# Patient Record
Sex: MALE | Race: WHITE | NOT HISPANIC OR LATINO | Employment: FULL TIME | URBAN - METROPOLITAN AREA
[De-identification: names, ages, dates, MRNs, and addresses within clinical notes are randomized per-mention and may not be internally consistent; named-entity substitution may affect disease eponyms.]

---

## 2018-04-06 ENCOUNTER — APPOINTMENT (OUTPATIENT)
Dept: LAB | Facility: CLINIC | Age: 33
End: 2018-04-06
Payer: COMMERCIAL

## 2018-04-06 ENCOUNTER — TRANSCRIBE ORDERS (OUTPATIENT)
Dept: LAB | Facility: CLINIC | Age: 33
End: 2018-04-06

## 2018-04-06 DIAGNOSIS — Z00.00 ROUTINE GENERAL MEDICAL EXAMINATION AT A HEALTH CARE FACILITY: ICD-10-CM

## 2018-04-06 DIAGNOSIS — Z00.00 ROUTINE GENERAL MEDICAL EXAMINATION AT A HEALTH CARE FACILITY: Primary | ICD-10-CM

## 2018-04-06 LAB
ALBUMIN SERPL BCP-MCNC: 4.3 G/DL (ref 3.5–5)
ALP SERPL-CCNC: 55 U/L (ref 46–116)
ALT SERPL W P-5'-P-CCNC: 27 U/L (ref 12–78)
ANION GAP SERPL CALCULATED.3IONS-SCNC: 6 MMOL/L (ref 4–13)
AST SERPL W P-5'-P-CCNC: 18 U/L (ref 5–45)
BILIRUB SERPL-MCNC: 1.55 MG/DL (ref 0.2–1)
BUN SERPL-MCNC: 15 MG/DL (ref 5–25)
CALCIUM SERPL-MCNC: 9.4 MG/DL (ref 8.3–10.1)
CHLORIDE SERPL-SCNC: 105 MMOL/L (ref 100–108)
CHOLEST SERPL-MCNC: 183 MG/DL (ref 50–200)
CO2 SERPL-SCNC: 29 MMOL/L (ref 21–32)
CREAT SERPL-MCNC: 1.15 MG/DL (ref 0.6–1.3)
ERYTHROCYTE [DISTWIDTH] IN BLOOD BY AUTOMATED COUNT: 12.3 % (ref 11.6–15.1)
FERRITIN SERPL-MCNC: 126 NG/ML (ref 8–388)
GFR SERPL CREATININE-BSD FRML MDRD: 84 ML/MIN/1.73SQ M
GLUCOSE P FAST SERPL-MCNC: 92 MG/DL (ref 65–99)
HCT VFR BLD AUTO: 44.6 % (ref 36.5–49.3)
HDLC SERPL-MCNC: 80 MG/DL (ref 40–60)
HGB BLD-MCNC: 15.9 G/DL (ref 12–17)
IRON SATN MFR SERPL: 36 %
IRON SERPL-MCNC: 131 UG/DL (ref 65–175)
LDLC SERPL CALC-MCNC: 85 MG/DL (ref 0–100)
MCH RBC QN AUTO: 32.9 PG (ref 26.8–34.3)
MCHC RBC AUTO-ENTMCNC: 35.7 G/DL (ref 31.4–37.4)
MCV RBC AUTO: 92 FL (ref 82–98)
NONHDLC SERPL-MCNC: 103 MG/DL
PLATELET # BLD AUTO: 226 THOUSANDS/UL (ref 149–390)
PMV BLD AUTO: 9.6 FL (ref 8.9–12.7)
POTASSIUM SERPL-SCNC: 3.9 MMOL/L (ref 3.5–5.3)
PROT SERPL-MCNC: 7.7 G/DL (ref 6.4–8.2)
RBC # BLD AUTO: 4.83 MILLION/UL (ref 3.88–5.62)
SODIUM SERPL-SCNC: 140 MMOL/L (ref 136–145)
TIBC SERPL-MCNC: 362 UG/DL (ref 250–450)
TRIGL SERPL-MCNC: 90 MG/DL
TSH SERPL DL<=0.05 MIU/L-ACNC: 1.5 UIU/ML (ref 0.36–3.74)
WBC # BLD AUTO: 7.51 THOUSAND/UL (ref 4.31–10.16)

## 2018-04-06 PROCEDURE — 36415 COLL VENOUS BLD VENIPUNCTURE: CPT

## 2018-04-06 PROCEDURE — 83550 IRON BINDING TEST: CPT

## 2018-04-06 PROCEDURE — 80053 COMPREHEN METABOLIC PANEL: CPT

## 2018-04-06 PROCEDURE — 85027 COMPLETE CBC AUTOMATED: CPT

## 2018-04-06 PROCEDURE — 82728 ASSAY OF FERRITIN: CPT

## 2018-04-06 PROCEDURE — 84443 ASSAY THYROID STIM HORMONE: CPT

## 2018-04-06 PROCEDURE — 83540 ASSAY OF IRON: CPT

## 2018-04-06 PROCEDURE — 80061 LIPID PANEL: CPT

## 2019-05-10 ENCOUNTER — OFFICE VISIT (OUTPATIENT)
Dept: INTERNAL MEDICINE CLINIC | Facility: CLINIC | Age: 34
End: 2019-05-10
Payer: COMMERCIAL

## 2019-05-10 VITALS
SYSTOLIC BLOOD PRESSURE: 120 MMHG | OXYGEN SATURATION: 100 % | HEART RATE: 76 BPM | HEIGHT: 74 IN | DIASTOLIC BLOOD PRESSURE: 74 MMHG | BODY MASS INDEX: 23.1 KG/M2 | WEIGHT: 180 LBS

## 2019-05-10 DIAGNOSIS — K90.0 CELIAC DISEASE: ICD-10-CM

## 2019-05-10 DIAGNOSIS — R00.2 PALPITATIONS: Primary | ICD-10-CM

## 2019-05-10 DIAGNOSIS — K59.04 CHRONIC IDIOPATHIC CONSTIPATION: ICD-10-CM

## 2019-05-10 DIAGNOSIS — Z13.6 SCREENING FOR CARDIOVASCULAR CONDITION: ICD-10-CM

## 2019-05-10 DIAGNOSIS — F33.41 RECURRENT MAJOR DEPRESSIVE DISORDER, IN PARTIAL REMISSION (HCC): ICD-10-CM

## 2019-05-10 DIAGNOSIS — M54.41 CHRONIC RIGHT-SIDED LOW BACK PAIN WITH RIGHT-SIDED SCIATICA: ICD-10-CM

## 2019-05-10 DIAGNOSIS — E55.9 VITAMIN D DEFICIENCY: ICD-10-CM

## 2019-05-10 DIAGNOSIS — G44.52 NEW DAILY PERSISTENT HEADACHE: ICD-10-CM

## 2019-05-10 DIAGNOSIS — G89.29 CHRONIC RIGHT-SIDED LOW BACK PAIN WITH RIGHT-SIDED SCIATICA: ICD-10-CM

## 2019-05-10 PROBLEM — F32.A DEPRESSION: Status: ACTIVE | Noted: 2019-05-10

## 2019-05-10 PROBLEM — M54.9 BACK PAIN: Status: ACTIVE | Noted: 2019-05-10

## 2019-05-10 PROCEDURE — 99204 OFFICE O/P NEW MOD 45 MIN: CPT | Performed by: INTERNAL MEDICINE

## 2019-05-10 PROCEDURE — 3008F BODY MASS INDEX DOCD: CPT | Performed by: INTERNAL MEDICINE

## 2019-05-10 RX ORDER — BUPROPION HYDROCHLORIDE 300 MG/1
300 TABLET ORAL DAILY
Refills: 0 | COMMUNITY
Start: 2019-04-14 | End: 2021-08-31 | Stop reason: ALTCHOICE

## 2019-06-06 ENCOUNTER — HOSPITAL ENCOUNTER (OUTPATIENT)
Dept: NON INVASIVE DIAGNOSTICS | Facility: CLINIC | Age: 34
Discharge: HOME | End: 2019-06-06
Payer: COMMERCIAL

## 2019-06-06 DIAGNOSIS — R00.2 PALPITATIONS: ICD-10-CM

## 2019-06-06 PROCEDURE — 93226 XTRNL ECG REC<48 HR SCAN A/R: CPT

## 2019-06-06 PROCEDURE — 93225 XTRNL ECG REC<48 HRS REC: CPT

## 2019-06-11 PROCEDURE — 93227 XTRNL ECG REC<48 HR R&I: CPT | Performed by: INTERNAL MEDICINE

## 2019-06-12 ENCOUNTER — TELEPHONE (OUTPATIENT)
Dept: INTERNAL MEDICINE CLINIC | Facility: CLINIC | Age: 34
End: 2019-06-12

## 2019-07-12 ENCOUNTER — TELEPHONE (OUTPATIENT)
Dept: INTERNAL MEDICINE CLINIC | Facility: CLINIC | Age: 34
End: 2019-07-12

## 2019-07-12 ENCOUNTER — LAB (OUTPATIENT)
Dept: LAB | Facility: CLINIC | Age: 34
End: 2019-07-12
Payer: COMMERCIAL

## 2019-07-12 DIAGNOSIS — G44.52 NEW DAILY PERSISTENT HEADACHE: ICD-10-CM

## 2019-07-12 DIAGNOSIS — E55.9 VITAMIN D DEFICIENCY: ICD-10-CM

## 2019-07-12 DIAGNOSIS — R00.2 PALPITATIONS: ICD-10-CM

## 2019-07-12 DIAGNOSIS — Z13.6 SCREENING FOR CARDIOVASCULAR CONDITION: ICD-10-CM

## 2019-07-12 LAB
25(OH)D3 SERPL-MCNC: 43.4 NG/ML (ref 30–100)
ALBUMIN SERPL BCP-MCNC: 4.3 G/DL (ref 3.5–5)
ALP SERPL-CCNC: 52 U/L (ref 46–116)
ALT SERPL W P-5'-P-CCNC: 27 U/L (ref 12–78)
ANION GAP SERPL CALCULATED.3IONS-SCNC: 4 MMOL/L (ref 4–13)
AST SERPL W P-5'-P-CCNC: 15 U/L (ref 5–45)
BASOPHILS # BLD AUTO: 0.03 THOUSANDS/ΜL (ref 0–0.1)
BASOPHILS NFR BLD AUTO: 0 % (ref 0–1)
BILIRUB SERPL-MCNC: 1.37 MG/DL (ref 0.2–1)
BUN SERPL-MCNC: 15 MG/DL (ref 5–25)
CALCIUM SERPL-MCNC: 9.4 MG/DL (ref 8.3–10.1)
CHLORIDE SERPL-SCNC: 103 MMOL/L (ref 100–108)
CHOLEST SERPL-MCNC: 190 MG/DL (ref 50–200)
CO2 SERPL-SCNC: 30 MMOL/L (ref 21–32)
CREAT SERPL-MCNC: 1.13 MG/DL (ref 0.6–1.3)
EOSINOPHIL # BLD AUTO: 0.27 THOUSAND/ΜL (ref 0–0.61)
EOSINOPHIL NFR BLD AUTO: 4 % (ref 0–6)
ERYTHROCYTE [DISTWIDTH] IN BLOOD BY AUTOMATED COUNT: 12 % (ref 11.6–15.1)
GFR SERPL CREATININE-BSD FRML MDRD: 85 ML/MIN/1.73SQ M
GLUCOSE P FAST SERPL-MCNC: 87 MG/DL (ref 65–99)
HCT VFR BLD AUTO: 46.4 % (ref 36.5–49.3)
HDLC SERPL-MCNC: 78 MG/DL (ref 40–60)
HGB BLD-MCNC: 15.6 G/DL (ref 12–17)
IMM GRANULOCYTES # BLD AUTO: 0.01 THOUSAND/UL (ref 0–0.2)
IMM GRANULOCYTES NFR BLD AUTO: 0 % (ref 0–2)
LDLC SERPL CALC-MCNC: 100 MG/DL (ref 0–100)
LYMPHOCYTES # BLD AUTO: 1.75 THOUSANDS/ΜL (ref 0.6–4.47)
LYMPHOCYTES NFR BLD AUTO: 26 % (ref 14–44)
MCH RBC QN AUTO: 31.9 PG (ref 26.8–34.3)
MCHC RBC AUTO-ENTMCNC: 33.6 G/DL (ref 31.4–37.4)
MCV RBC AUTO: 95 FL (ref 82–98)
MONOCYTES # BLD AUTO: 0.7 THOUSAND/ΜL (ref 0.17–1.22)
MONOCYTES NFR BLD AUTO: 10 % (ref 4–12)
NEUTROPHILS # BLD AUTO: 4.01 THOUSANDS/ΜL (ref 1.85–7.62)
NEUTS SEG NFR BLD AUTO: 60 % (ref 43–75)
NONHDLC SERPL-MCNC: 112 MG/DL
NRBC BLD AUTO-RTO: 0 /100 WBCS
PLATELET # BLD AUTO: 217 THOUSANDS/UL (ref 149–390)
PMV BLD AUTO: 9.6 FL (ref 8.9–12.7)
POTASSIUM SERPL-SCNC: 3.8 MMOL/L (ref 3.5–5.3)
PROT SERPL-MCNC: 7.5 G/DL (ref 6.4–8.2)
RBC # BLD AUTO: 4.89 MILLION/UL (ref 3.88–5.62)
SODIUM SERPL-SCNC: 137 MMOL/L (ref 136–145)
TRIGL SERPL-MCNC: 59 MG/DL
TSH SERPL DL<=0.05 MIU/L-ACNC: 1.18 UIU/ML (ref 0.36–3.74)
WBC # BLD AUTO: 6.77 THOUSAND/UL (ref 4.31–10.16)

## 2019-07-12 PROCEDURE — 84443 ASSAY THYROID STIM HORMONE: CPT

## 2019-07-12 PROCEDURE — 36415 COLL VENOUS BLD VENIPUNCTURE: CPT

## 2019-07-12 PROCEDURE — 85025 COMPLETE CBC W/AUTO DIFF WBC: CPT

## 2019-07-12 PROCEDURE — 80061 LIPID PANEL: CPT

## 2019-07-12 PROCEDURE — 82306 VITAMIN D 25 HYDROXY: CPT

## 2019-07-12 PROCEDURE — 80053 COMPREHEN METABOLIC PANEL: CPT

## 2019-07-12 NOTE — TELEPHONE ENCOUNTER
----- Message from Delfina Guzmán MD sent at 7/12/2019  4:50 PM EDT -----  Labs ok, please call and inform patient

## 2019-07-23 ENCOUNTER — CONSULT (OUTPATIENT)
Dept: NEUROLOGY | Facility: CLINIC | Age: 34
End: 2019-07-23
Payer: COMMERCIAL

## 2019-07-23 VITALS
WEIGHT: 180.4 LBS | BODY MASS INDEX: 23.15 KG/M2 | HEART RATE: 68 BPM | DIASTOLIC BLOOD PRESSURE: 70 MMHG | HEIGHT: 74 IN | SYSTOLIC BLOOD PRESSURE: 110 MMHG

## 2019-07-23 DIAGNOSIS — G44.89 OTHER HEADACHE SYNDROME: Primary | ICD-10-CM

## 2019-07-23 DIAGNOSIS — G44.52 NEW DAILY PERSISTENT HEADACHE: ICD-10-CM

## 2019-07-23 PROCEDURE — 99244 OFF/OP CNSLTJ NEW/EST MOD 40: CPT | Performed by: PSYCHIATRY & NEUROLOGY

## 2019-07-23 NOTE — PROGRESS NOTES
Lefty Alcantara is a 36 y o  male  Chief Complaint   Patient presents with    Headache       Assessment:  1  Other headache syndrome    2  New daily persistent headache          Discussion:  Differential diagnosis discussed with the patient including ice pick headache, versus other etiologies, would recommend an MRI scan of the brain and blood work to evaluate for the headache, we would hold off on any medication at this time, if he has any worsening headaches he will call me and we will consider medications like Neurontin, he was advised to avoid migraine triggers even though his headache is not typical of a migraine headache, to keep his blood pressure cholesterol and sugar under control, he was advised to follow up with an ENT surgeon regarding his sinus issues, and  was advised to follow up with family physician regarding his slightly increased total bilirubin, to keep himself well hydrated, go to the hospital if has any worsening symptoms and call me otherwise to see me back in 2 months and follow up with family physician      Subjective:    HPI   Patient is here for evaluation of headache for the last 6-7 months, he describes his headaches mostly in by temporal region or occipital region like a sharp headache lasting for a few seconds 6-7 on 10 not associated with photophobia and phonophobia, no vision or speech difficulty, denies any motor or sensory symptoms in upper or lower extremity, no focal weakness, no confusion, no recent illnesses, no fever, he would get the headaches at least 1 to 2 times a day, denies any triggering or relieving factors, he has some issues with his sinuses, for the last 1 month his he has not had headache, no vision difficulty, mood is good, sleep is good, no history of any trauma, appetite is good, weight has been stable, he occasionally has some neck pain secondary to leaning forward on the computer, no family history of brain aneurysm, no other neurological complaints in the family, no other complaints  Vitals:    07/23/19 0911   BP: 110/70   BP Location: Left arm   Patient Position: Sitting   Cuff Size: Adult   Pulse: 68   Weight: 81 8 kg (180 lb 6 4 oz)   Height: 6' 2" (1 88 m)       Current Medications    Current Outpatient Medications:     buPROPion (WELLBUTRIN XL) 300 mg 24 hr tablet, Take 300 mg by mouth daily, Disp: , Rfl: 0      Allergies  Bactrim [sulfamethoxazole-trimethoprim]    Past Medical History  Past Medical History:   Diagnosis Date    Back pain     Celiac disease     Depression          Past Surgical History:  History reviewed  No pertinent surgical history  Family History:  Family History   Problem Relation Age of Onset    Diabetes Maternal Grandmother     Heart disease Paternal Grandfather     No Known Problems Mother     No Known Problems Father        Social History:   reports that he has never smoked  He has never used smokeless tobacco  He reports that he drinks alcohol  He reports that he does not use drugs  I have reviewed the past medical history, surgical history, social and family history, current medications, allergies vitals, review of systems, and updated this information as appropriate today  Objective:    Physical Exam    Neurological Exam    GENERAL:  Cooperative in no acute distress  Well-developed and well-nourished    HEAD and NECK   Head is atraumatic normocephalic with no lesions or masses  Neck is supple with full range of motion    CARDIOVASCULAR  Carotid Arteries-no carotid bruits  NEUROLOGIC:  Mental Status-the patient is awake alert and oriented without aphasia or apraxia  Cranial Nerves: Visual fields are full to confrontation  Discs are flat  Limited exam as unable to dilate the pupils, visual acuity is 20/20 with hand-held chart Extraocular movements are full without nystagmus  Pupils are 2-1/2 mm and reactive  Face is symmetrical to light touch  Movements of facial expression move symmetrically   Hearing is normal to finger rub bilaterally  Soft palate lifts symmetrically  Shoulder shrug is symmetrical  Tongue is midline without atrophy  Motor: No drift is noted on arm extension  Strength is full in the upper and lower extremities with normal bulk and tone  Sensory: Intact to temperature and vibratory sensation in the upper and lower extremities bilaterally  Cortical function is intact  Coordination: Finger to nose testing is performed accurately  Romberg is negative  Gait reveals a normal base with symmetrical arm swing  Tandem walk is normal   Reflexes:     2+ and symmetrical  Toes are downgoing          ROS:  Review of Systems   Constitutional: Negative  Negative for appetite change, fatigue and fever  HENT: Positive for tinnitus  Negative for hearing loss, trouble swallowing and voice change  Eyes: Negative  Negative for photophobia, pain and visual disturbance  Respiratory: Negative  Negative for shortness of breath and wheezing  Cardiovascular: Negative  Negative for chest pain and palpitations  Gastrointestinal: Negative  Negative for nausea and vomiting  Endocrine: Negative  Negative for cold intolerance and heat intolerance  Genitourinary: Negative  Negative for dysuria, frequency and urgency  Musculoskeletal: Positive for back pain, neck pain and neck stiffness  Negative for myalgias  Skin: Negative  Negative for rash  Allergic/Immunologic: Negative  Neurological: Positive for numbness (left leg occasionally ) and headaches  Negative for dizziness, tremors, seizures, syncope, facial asymmetry, speech difficulty, weakness and light-headedness  Hematological: Negative  Does not bruise/bleed easily  Psychiatric/Behavioral: Negative  Negative for confusion, decreased concentration, hallucinations and sleep disturbance

## 2019-08-06 ENCOUNTER — APPOINTMENT (OUTPATIENT)
Dept: LAB | Facility: CLINIC | Age: 34
End: 2019-08-06
Payer: COMMERCIAL

## 2019-08-06 ENCOUNTER — HOSPITAL ENCOUNTER (OUTPATIENT)
Dept: MRI IMAGING | Facility: CLINIC | Age: 34
Discharge: HOME | End: 2019-08-06
Payer: COMMERCIAL

## 2019-08-06 DIAGNOSIS — G44.89 OTHER HEADACHE SYNDROME: ICD-10-CM

## 2019-08-06 LAB — CRP SERPL QL: <3 MG/L

## 2019-08-06 PROCEDURE — 70551 MRI BRAIN STEM W/O DYE: CPT

## 2019-08-06 PROCEDURE — 85652 RBC SED RATE AUTOMATED: CPT

## 2019-08-06 PROCEDURE — 86140 C-REACTIVE PROTEIN: CPT

## 2019-08-06 PROCEDURE — 36415 COLL VENOUS BLD VENIPUNCTURE: CPT

## 2019-08-06 PROCEDURE — 86618 LYME DISEASE ANTIBODY: CPT

## 2019-08-07 LAB — ERYTHROCYTE [SEDIMENTATION RATE] IN BLOOD: 2 MM/HOUR (ref 0–10)

## 2019-08-08 LAB
B BURGDOR IGG SER IA-ACNC: 0.19
B BURGDOR IGM SER IA-ACNC: 0.39

## 2020-01-31 ENCOUNTER — OFFICE VISIT (OUTPATIENT)
Dept: INTERNAL MEDICINE CLINIC | Facility: CLINIC | Age: 35
End: 2020-01-31
Payer: COMMERCIAL

## 2020-01-31 VITALS
OXYGEN SATURATION: 97 % | HEART RATE: 82 BPM | BODY MASS INDEX: 23.23 KG/M2 | SYSTOLIC BLOOD PRESSURE: 100 MMHG | HEIGHT: 74 IN | DIASTOLIC BLOOD PRESSURE: 76 MMHG | WEIGHT: 181 LBS | TEMPERATURE: 98.2 F

## 2020-01-31 DIAGNOSIS — J06.9 UPPER RESPIRATORY TRACT INFECTION, UNSPECIFIED TYPE: Primary | ICD-10-CM

## 2020-01-31 PROCEDURE — 99213 OFFICE O/P EST LOW 20 MIN: CPT | Performed by: INTERNAL MEDICINE

## 2020-01-31 PROCEDURE — 3008F BODY MASS INDEX DOCD: CPT | Performed by: INTERNAL MEDICINE

## 2020-01-31 RX ORDER — OSELTAMIVIR PHOSPHATE 75 MG/1
75 CAPSULE ORAL EVERY 12 HOURS SCHEDULED
Qty: 10 CAPSULE | Refills: 0 | Status: SHIPPED | OUTPATIENT
Start: 2020-01-31 | End: 2020-02-05

## 2020-01-31 NOTE — PROGRESS NOTES
INTERNAL MEDICINE FOLLOW-UP OFFICE VISIT  [facility]    NAME: Santos Pinedo  AGE: 36 y o  SEX: male  : 1985   MRN: [id number]    DATE: 2020  TIME: 12:04 PM    Assessment and Plan     Diagnoses and all orders for this visit:    Upper respiratory tract infection, unspecified type  -     oseltamivir (TAMIFLU) 75 mg capsule; Take 1 capsule (75 mg total) by mouth every 12 (twelve) hours for 5 days     he was told to take Tylenol for the fever  If he does not get better in the next 3-4 days, he will call  - Counseling Documentation: patient was counseled regarding: instructions for management, risk factor reductions, prognosis, patient and family education, risks and benefits of treatment options and importance of compliance with treatment  - Medication Side Effects: Adverse side effects of medications were reviewed with the patient/guardian today  Return to office in: as needed    Chief Complaint     Chief Complaint   Patient presents with    Fever    Headache    Dizziness       History of Present Illness     Fever   This is a new problem  The current episode started in the past 7 days  The problem occurs daily  The problem has been unchanged  Associated symptoms include chills, fatigue, a fever, headaches, myalgias, a sore throat and weakness  Pertinent negatives include no abdominal pain, arthralgias, chest pain, congestion, coughing, diaphoresis, joint swelling, nausea, neck pain, numbness, rash or vomiting  Nothing aggravates the symptoms  He has tried acetaminophen for the symptoms  The treatment provided mild relief  The following portions of the patient's history were reviewed and updated as appropriate: allergies, current medications, past family history, past medical history, past social history, past surgical history and problem list     Review of Systems     Review of Systems   Constitutional: Positive for chills, fatigue and fever  Negative for diaphoresis  HENT: Positive for sore throat  Negative for congestion, ear discharge, ear pain, hearing loss, postnasal drip, rhinorrhea, sinus pressure, sinus pain, sneezing and voice change  Eyes: Negative for pain, discharge, redness and visual disturbance  Respiratory: Negative for cough, chest tightness, shortness of breath and wheezing  Cardiovascular: Negative for chest pain, palpitations and leg swelling  Gastrointestinal: Negative for abdominal distention, abdominal pain, blood in stool, constipation, diarrhea, nausea and vomiting  Endocrine: Negative for cold intolerance, heat intolerance, polydipsia, polyphagia and polyuria  Genitourinary: Negative for dysuria, flank pain, frequency, hematuria and urgency  Musculoskeletal: Positive for myalgias  Negative for arthralgias, back pain, gait problem, joint swelling, neck pain and neck stiffness  Skin: Negative for rash  Neurological: Positive for weakness and headaches  Negative for dizziness, tremors, syncope, facial asymmetry, speech difficulty, light-headedness and numbness  Hematological: Does not bruise/bleed easily  Psychiatric/Behavioral: Negative for behavioral problems, confusion and sleep disturbance  The patient is not nervous/anxious  Active Problem List     Patient Active Problem List   Diagnosis    Chronic idiopathic constipation    Palpitations    New daily persistent headache    Celiac disease    Recurrent major depressive disorder, in partial remission (HCC)    Chronic right-sided low back pain with right-sided sciatica    Vitamin D deficiency    Other headache syndrome       Objective     /76   Pulse 82   Temp 98 2 °F (36 8 °C)   Ht 6' 2" (1 88 m)   Wt 82 1 kg (181 lb)   SpO2 97%   BMI 23 24 kg/m²     Physical Exam   Constitutional: He is oriented to person, place, and time  He appears well-developed and well-nourished  No distress  HENT:   Head: Normocephalic and atraumatic  Right Ear: External ear normal    Left Ear: External ear normal    Nose: Nose normal     Throat congested and red   Eyes: Conjunctivae and EOM are normal  Right eye exhibits no discharge  Left eye exhibits no discharge  No scleral icterus  Neck: Normal range of motion  Neck supple  No JVD present  No tracheal deviation present  No thyromegaly present  Cardiovascular: Normal rate, regular rhythm, normal heart sounds and intact distal pulses  Exam reveals no gallop and no friction rub  No murmur heard  Pulmonary/Chest: Effort normal and breath sounds normal  No respiratory distress  He has no wheezes  He has no rales  He exhibits no tenderness  Abdominal: Soft  Bowel sounds are normal  He exhibits no distension  There is no tenderness  There is no rebound and no guarding  Musculoskeletal: Normal range of motion  He exhibits no edema or tenderness  Lymphadenopathy:     He has no cervical adenopathy  Neurological: He is alert and oriented to person, place, and time  No cranial nerve deficit  He exhibits normal muscle tone  Coordination normal    Skin: Skin is warm and dry  No rash noted  He is not diaphoretic  No erythema  Psychiatric: He has a normal mood and affect   Judgment normal          Current Medications       Current Outpatient Medications:     buPROPion (WELLBUTRIN XL) 300 mg 24 hr tablet, Take 300 mg by mouth daily, Disp: , Rfl: 0    oseltamivir (TAMIFLU) 75 mg capsule, Take 1 capsule (75 mg total) by mouth every 12 (twelve) hours for 5 days, Disp: 10 capsule, Rfl: 0    Health Maintenance     Health Maintenance   Topic Date Due    DTaP,Tdap,and Td Vaccines (1 - Tdap) 05/25/1997    HIV Screening  05/25/2001    Annual Physical  05/25/2004    Influenza Vaccine  07/01/2019    BMI: Adult  01/31/2021    Pneumococcal Vaccine: 65+ Years (1 of 2 - PCV13) 05/25/2051    Pneumococcal Vaccine: Pediatrics (0 to 5 Years) and At-Risk Patients (6 to 59 Years)  Aged Out    HIB Vaccine  Aged Tony [place] Hepatitis B Vaccine  Aged Out    IPV Vaccine  Aged Out    Hepatitis A Vaccine  Aged Out    Meningococcal ACWY Vaccine  Aged Out    HPV Vaccine  Aged Out       There is no immunization history on file for this patient        Marjorie Castrejon MD  [address] AT [facility]

## 2021-06-01 ENCOUNTER — TELEMEDICINE (OUTPATIENT)
Dept: INTERNAL MEDICINE CLINIC | Facility: CLINIC | Age: 36
End: 2021-06-01
Payer: COMMERCIAL

## 2021-06-01 DIAGNOSIS — J01.01 ACUTE RECURRENT MAXILLARY SINUSITIS: Primary | ICD-10-CM

## 2021-06-01 PROCEDURE — 99213 OFFICE O/P EST LOW 20 MIN: CPT | Performed by: INTERNAL MEDICINE

## 2021-06-01 RX ORDER — AMOXICILLIN 875 MG/1
875 TABLET, COATED ORAL 2 TIMES DAILY
Qty: 14 TABLET | Refills: 0 | Status: SHIPPED | OUTPATIENT
Start: 2021-06-01 | End: 2021-06-08

## 2021-06-01 RX ORDER — BENZONATATE 200 MG/1
200 CAPSULE ORAL 3 TIMES DAILY
COMMUNITY
Start: 2021-05-29 | End: 2021-08-31 | Stop reason: ALTCHOICE

## 2021-06-01 RX ORDER — PREDNISONE 20 MG/1
60 TABLET ORAL DAILY
COMMUNITY
Start: 2021-05-29 | End: 2021-08-31 | Stop reason: ALTCHOICE

## 2021-06-01 NOTE — PROGRESS NOTES
Virtual Regular Visit      Assessment/Plan:    Problem List Items Addressed This Visit     None      Visit Diagnoses     Acute recurrent maxillary sinusitis    -  Primary    Relevant Medications    amoxicillin (AMOXIL) 875 mg tablet               Reason for visit is No chief complaint on file  Encounter provider Luz Lee MD    Provider located at [address] [place] [address]      Recent Visits  No visits were found meeting these conditions  Showing recent visits within past 7 days and meeting all other requirements     Future Appointments  No visits were found meeting these conditions  Showing future appointments within next 150 days and meeting all other requirements        The patient was identified by name and date of birth  Charity Lamb was informed that this is a telemedicine visit and that the visit is being conducted through [address] [place] [address] Now and patient was informed that this is a secure, HIPAA-compliant platform  He agrees to proceed     My office door was closed  No one else was in the room  He acknowledged consent and understanding of privacy and security of the video platform  The patient has agreed to participate and understands they can discontinue the visit at any time  Patient is aware this is a billable service  Subjective  Charity Lamb is a 29 y o  male  Who complains of sore throat, yellow green phlegm, cough, sinus pressure and headache for the last couple of days  He went to the urgent care and was given prednisone and Tessalon Perles which are not helping him  He denies any fever or chills  Dara Soulier HPI      Sinusitis     Past Medical History:   Diagnosis Date    Back pain     Celiac disease     Depression        No past surgical history on file      Current Outpatient Medications   Medication Sig Dispense Refill    amoxicillin (AMOXIL) 875 mg tablet Take 1 tablet (875 mg total) by mouth 2 (two) times a day for 7 days 14 tablet 0    benzonatate (TESSALON) 200 MG capsule Take 200 mg by mouth 3 (three) times a day      buPROPion (WELLBUTRIN XL) 300 mg 24 hr tablet Take 300 mg by mouth daily  0    predniSONE 20 mg tablet Take 60 mg by mouth daily       No current facility-administered medications for this visit  Allergies   Allergen Reactions    Bactrim [Sulfamethoxazole-Trimethoprim] Hives       Review of Systems   Constitutional: Negative for chills, diaphoresis, fatigue and fever  HENT: Positive for congestion, rhinorrhea, sinus pressure, sinus pain and sore throat  Negative for ear discharge, ear pain, hearing loss, postnasal drip, sneezing and voice change  Eyes: Negative for pain, discharge, redness and visual disturbance  Respiratory: Positive for cough  Negative for chest tightness, shortness of breath and wheezing  Cardiovascular: Negative for chest pain, palpitations and leg swelling  Gastrointestinal: Negative for abdominal distention, abdominal pain, blood in stool, constipation, diarrhea, nausea and vomiting  Endocrine: Negative for cold intolerance, heat intolerance, polydipsia, polyphagia and polyuria  Genitourinary: Negative for dysuria, flank pain, frequency, hematuria and urgency  Musculoskeletal: Negative for arthralgias, back pain, gait problem, joint swelling, myalgias, neck pain and neck stiffness  Skin: Negative for rash  Neurological: Positive for headaches  Negative for dizziness, tremors, syncope, facial asymmetry, speech difficulty, weakness, light-headedness and numbness  Hematological: Does not bruise/bleed easily  Psychiatric/Behavioral: Negative for behavioral problems, confusion and sleep disturbance  The patient is not nervous/anxious  Video Exam    There were no vitals filed for this visit  Physical Exam  Constitutional:       Appearance: Normal appearance  HENT:      Head: Normocephalic     Eyes:      Conjunctiva/sclera: Conjunctivae normal    Pulmonary:      Effort: Pulmonary effort is normal    Neurological:      Mental Status: He is alert and oriented to person, place, and time  Psychiatric:         Mood and Affect: Mood normal        Was advised to take amoxicillin for 7 days and continue taking the prednisone and Tessalon Perles for now  He was also told to continue the Flonase nasal spray and Zyrtec  I spent   Twenty minutes with patient today in which greater than 50% of the time was spent in counseling/coordination of care regarding  management of sinusitis      VIRTUAL VISIT DISCLAIMER    Edwin Trimble acknowledges that he has consented to an online visit or consultation  He understands that the online visit is based solely on information provided by him, and that, in the absence of a face-to-face physical evaluation by the physician, the diagnosis he receives is both limited and provisional in terms of accuracy and completeness  This is not intended to replace a full medical face-to-face evaluation by the physician  Edwin Trimble understands and accepts these terms

## 2021-08-31 ENCOUNTER — OFFICE VISIT (OUTPATIENT)
Dept: INTERNAL MEDICINE CLINIC | Facility: CLINIC | Age: 36
End: 2021-08-31
Payer: COMMERCIAL

## 2021-08-31 VITALS
TEMPERATURE: 98.6 F | SYSTOLIC BLOOD PRESSURE: 110 MMHG | HEART RATE: 88 BPM | HEIGHT: 74 IN | DIASTOLIC BLOOD PRESSURE: 80 MMHG | WEIGHT: 188.8 LBS | OXYGEN SATURATION: 100 % | BODY MASS INDEX: 24.23 KG/M2

## 2021-08-31 DIAGNOSIS — Z11.59 NEED FOR HEPATITIS C SCREENING TEST: ICD-10-CM

## 2021-08-31 DIAGNOSIS — R36.1 BLOODY EJACULATION: ICD-10-CM

## 2021-08-31 DIAGNOSIS — F41.1 GENERALIZED ANXIETY DISORDER: Primary | ICD-10-CM

## 2021-08-31 DIAGNOSIS — Z13.6 SCREENING FOR CARDIOVASCULAR CONDITION: ICD-10-CM

## 2021-08-31 DIAGNOSIS — E55.9 VITAMIN D DEFICIENCY: ICD-10-CM

## 2021-08-31 DIAGNOSIS — R68.82 DECREASED LIBIDO: ICD-10-CM

## 2021-08-31 DIAGNOSIS — Z11.4 ENCOUNTER FOR SCREENING FOR HIV: ICD-10-CM

## 2021-08-31 DIAGNOSIS — L20.84 INTRINSIC ECZEMA: ICD-10-CM

## 2021-08-31 PROBLEM — M54.41 CHRONIC RIGHT-SIDED LOW BACK PAIN WITH RIGHT-SIDED SCIATICA: Status: RESOLVED | Noted: 2019-05-10 | Resolved: 2021-08-31

## 2021-08-31 PROBLEM — G44.52 NEW DAILY PERSISTENT HEADACHE: Status: RESOLVED | Noted: 2019-05-10 | Resolved: 2021-08-31

## 2021-08-31 PROBLEM — R00.2 PALPITATIONS: Status: RESOLVED | Noted: 2019-05-10 | Resolved: 2021-08-31

## 2021-08-31 PROBLEM — G44.89 OTHER HEADACHE SYNDROME: Status: RESOLVED | Noted: 2019-07-23 | Resolved: 2021-08-31

## 2021-08-31 PROBLEM — F33.41 RECURRENT MAJOR DEPRESSIVE DISORDER, IN PARTIAL REMISSION (HCC): Status: RESOLVED | Noted: 2019-05-10 | Resolved: 2021-08-31

## 2021-08-31 PROBLEM — K59.04 CHRONIC IDIOPATHIC CONSTIPATION: Status: RESOLVED | Noted: 2019-05-10 | Resolved: 2021-08-31

## 2021-08-31 PROBLEM — G89.29 CHRONIC RIGHT-SIDED LOW BACK PAIN WITH RIGHT-SIDED SCIATICA: Status: RESOLVED | Noted: 2019-05-10 | Resolved: 2021-08-31

## 2021-08-31 PROCEDURE — 99214 OFFICE O/P EST MOD 30 MIN: CPT | Performed by: INTERNAL MEDICINE

## 2021-08-31 RX ORDER — DIPHENOXYLATE HYDROCHLORIDE AND ATROPINE SULFATE 2.5; .025 MG/1; MG/1
1 TABLET ORAL DAILY
COMMUNITY

## 2021-08-31 RX ORDER — BUSPIRONE HYDROCHLORIDE 5 MG/1
5 TABLET ORAL 2 TIMES DAILY
Qty: 180 TABLET | Refills: 3 | Status: SHIPPED | OUTPATIENT
Start: 2021-08-31 | End: 2022-06-20 | Stop reason: SDUPTHER

## 2021-08-31 NOTE — PROGRESS NOTES
INTERNAL MEDICINE OFFICE VISIT  [facility] AT [address]  Tel: [phone]      NAME: Juanita Cuellar  AGE: 29 y o  SEX: male  : 1985   MRN: [id number]    DATE: 2021  TIME: 3:27 PM      Assessment and Plan:  1  Generalized anxiety disorder   was started on BuSpar  I will see him back in 2 months for compliance  - TSH, 3rd generation; Future  - busPIRone (BUSPAR) 5 mg tablet; Take 1 tablet (5 mg total) by mouth 2 (two) times a day  Dispense: 180 tablet; Refill: 3    2  Bloody ejaculation   was advised to see Urology  - Urinalysis with microscopic  - Ambulatory referral to Urology; Future    3  Decreased libido   was told to do relaxation exercises  - Ambulatory referral to Urology; Future    4  Intrinsic eczema   stable  - CBC and differential; Future  - Comprehensive metabolic panel; Future    5  Vitamin D deficiency   check a vitamin-D level  - Vitamin D 25 hydroxy; Future    6  Encounter for screening for HIV    - HIV 1/2 Antigen/Antibody (4th Generation) w Reflex [facility]; Future    7  Need for hepatitis C screening test    - Hepatitis C antibody; Future    8  Screening for cardiovascular condition    - Lipid panel; Future      - Counseling Documentation: patient was counseled regarding: diagnostic results, instructions for management, risk factor reductions, prognosis, patient and family education, risks and benefits of treatment options and importance of compliance with treatment  - Medication Side Effects: Adverse side effects of medications were reviewed with the patient/guardian today  Return for follow up visit in  2 months or earlier, if needed  Chief Complaint:  Chief Complaint   Patient presents with    Annual Exam     patient would like to discuss pleasure losss in sex    Eczema    Stress         History of Present Illness:    patient has been very stressed out lately mostly due to the pressure of work  He is presently not taking any medication for it  He has decreased libido and on 1 occasion had blood in his ejaculate  It could be related to stress but I would like him to be checked out by the urologist     Because of stress he has his eczema coming back  He has not had blood work done which was ordered      Active Problem List:  Patient Active Problem List   Diagnosis    Celiac disease    Vitamin D deficiency    Decreased libido    Bloody ejaculation    Intrinsic eczema    Generalized anxiety disorder         Past Medical History:  Past Medical History:   Diagnosis Date    Back pain     Celiac disease     Depression          Past Surgical History:  History reviewed  No pertinent surgical history  Family History:  Family History   Problem Relation Age of Onset    Diabetes Maternal Grandmother     Heart disease Paternal Grandfather     No Known Problems Mother     No Known Problems Father          Social History:  Social History     Socioeconomic History    Marital status: /Civil Union     Spouse name: None    Number of children: None    Years of education: None    Highest education level: None   Occupational History    None   Tobacco Use    Smoking status: Never Smoker    Smokeless tobacco: Never Used   Vaping Use    Vaping Use: Never used   Substance and Sexual Activity    Alcohol use: Yes    Drug use: Never    Sexual activity: None   Other Topics Concern    None   Social History Narrative    None     Social Determinants of Health     Financial Resource Strain:     Difficulty of Paying Living Expenses:    Food Insecurity:     Worried About Running Out of Food in the Last Year:     [address] in the Last Year:    Transportation Needs:     Lack of Transportation (Medical):      Lack of Transportation (Non-Medical):    Physical Activity:     Days of Exercise per Week:     Minutes of Exercise per Session:    Stress:     Feeling of Stress :    Social Connections:  Frequency of Communication with Friends and Family:     Frequency of Social Gatherings with Friends and Family:     Attends [employer] Services:     Active Member of Clubs or Organizations:     Attends Club or Organization Meetings:     Marital Status:    Intimate Partner Violence:     Fear of Current or Ex-Partner:     Emotionally Abused:     Physically Abused:     Sexually Abused: Allergies:   Allergies   Allergen Reactions    Bactrim [Sulfamethoxazole-Trimethoprim] Hives         Medications:    Current Outpatient Medications:     multivitamin (THERAGRAN) TABS, Take 1 tablet by mouth daily, Disp: , Rfl:     busPIRone (BUSPAR) 5 mg tablet, Take 1 tablet (5 mg total) by mouth 2 (two) times a day, Disp: 180 tablet, Rfl: 3      The following portions of the patient's history were reviewed and updated as appropriate: past medical history, past surgical history, family history, social history, allergies, current medications and active problem list       Review of Systems:  Constitutional: Denies fever, chills, weight gain, weight loss, fatigue  Eyes: Denies eye redness, eye discharge, double vision, change in visual acuity  ENT: Denies hearing loss, tinnitus, sneezing, nasal congestion, nasal discharge, sore throat   Respiratory: Denies cough, expectoration, hemoptysis, shortness of breath, wheezing  Cardiovascular: Denies chest pain, palpitations, lower extremity swelling, orthopnea, PND  Gastrointestinal: Denies abdominal pain, heartburn, nausea, vomiting, hematemesis, diarrhea, bloody stools  Genito-Urinary: Denies dysuria, frequency, difficulty in micturition, nocturia, incontinence  Musculoskeletal: Denies back pain, joint pain, muscle pain  Neurologic: Denies confusion, lightheadedness, syncope, headache, focal weakness, sensory changes, seizures  Endocrine: Denies polyuria, polydipsia, temperature intolerance  Allergy and Immunology: Denies hives, insect bite sensitivity  Hematological and Lymphatic: Denies bleeding problems, swollen glands   Psychological: Denies depression, suicidal ideation, complains of anxiety, panic, mood swings  Dermatological: Denies pruritus, rash, skin lesion changes      Vitals:  Vitals:    08/31/21 1521   BP: 110/80   Pulse:    Temp:    SpO2:        Body mass index is 24 24 kg/m²  Weight (last 2 days)     Date/Time   Weight    08/31/21 1502   85 6 (188 8)                Physical Examination:  General: Patient is not in acute distress  Awake, alert, responding to commands  No weight gain or loss  Head: Normocephalic  Atraumatic  Eyes: Conjunctiva and lids with no swelling, erythema or discharge  Both pupils normal sized, round and reactive to light  Sclera nonicteric  ENT: External examination of nose and ear normal  Otoscopic examination shows translucent tympanic membranes with patent canals without erythema  Oropharynx moist with no erythema, edema, exudate or lesions  Neck: Supple  JVP not raised  Trachea midline  No masses  No thyromegaly  Lungs: No signs of increased work of breathing or respiratory distress  Bilateral bronchovascular breath sounds with no crackles or rhonchi  Chest wall: No tenderness  Cardiovascular: Normal PMI  No thrills  Regular rate and rhythm  S1 and S2 normal  No murmur, rub or gallop  Gastrointestinal: Abdomen soft, nontender  No guarding or rigidity  Liver and spleen not palpable  Bowel sounds present  Neurologic: Cranial nerves II-XII intact   Cortical functions normal  Motor system - Reflexes 2+ and symmetrical  Sensations normal  Musculoskeletal: Gait normal  No joint tenderness  Integumentary: Skin normal with no rash or lesions  Lymphatic: No palpable lymph nodes in neck, axilla or groin  Extremities: No clubbing, cyanosis, edema or varicosities  Psychological: Judgement and insight normal   Very anxious      Laboratory Results:  CBC with diff:   Lab Results   Component Value Date    WBC 6 77 07/12/2019    RBC 4 89 07/12/2019    HGB 15 6 07/12/2019    HCT 46 4 07/12/2019    MCV 95 07/12/2019    MCH 31 9 07/12/2019    RDW 12 0 07/12/2019     07/12/2019       CMP:  Lab Results   Component Value Date    CREATININE 1 13 07/12/2019    BUN 15 07/12/2019    K 3 8 07/12/2019     07/12/2019    CO2 30 07/12/2019    ALKPHOS 52 07/12/2019    ALT 27 07/12/2019    AST 15 07/12/2019       No results found for: HGBA1C, MG, PHOS    No results found for: TROPONINI, CKMB, CKTOTAL    Lipid Profile:   No results found for: CHOL  Lab Results   Component Value Date    HDL 78 (H) 07/12/2019    HDL 80 (H) 04/06/2018     Lab Results   Component Value Date    LDLCALC 100 07/12/2019    [facility] 85 04/06/2018     Lab Results   Component Value Date    TRIG 59 07/12/2019    TRIG 90 04/06/2018       Imaging Results:  MRI brain without contrast  Narrative: MRI BRAIN WITHOUT CONTRAST    INDICATION: : 28-year-old male, headaches    COMPARISON:   None  TECHNIQUE:  Sagittal T1, axial T2, axial FLAIR, axial T1, axial Amlin and axial diffusion imaging  IMAGE QUALITY:  Diagnostic  FINDINGS:    BRAIN PARENCHYMA:  There is no discrete mass, mass effect or midline shift  There is no intracranial hemorrhage  There is no evidence of acute infarction and diffusion imaging is unremarkable  There are no white matter changes in the cerebral   hemispheres  VENTRICLES:  The ventricles are normal in size and contour  SELLA AND PITUITARY GLAND:  Normal     ORBITS:  Normal     PARANASAL SINUSES:  Normal     VASCULATURE:  Evaluation of the major intracranial vasculature demonstrates appropriate flow voids      CALVARIUM AND SKULL BASE:  Normal     EXTRACRANIAL SOFT TISSUES:  Normal   Impression: No significant intracranial pathology identified    Workstation performed: [id number]       Health Maintenance:  Health Maintenance   Topic Date Due    Hepatitis C Screening  Never done    HIV Screening  Never done    Annual Physical  Never done    DTaP,Tdap,and Td Vaccines (1 - Tdap) Never done    Influenza Vaccine (1) 09/01/2021    BMI: Adult  08/31/2022    Depression Remission PHQ  08/31/2022    COVID-19 Vaccine  Completed    Pneumococcal Vaccine: Pediatrics (0 to 5 Years) and At-Risk Patients (6 to 59 Years)  Aged Out    HIB Vaccine  Aged Out    Hepatitis B Vaccine  Aged Out    IPV Vaccine  Aged Out    Hepatitis A Vaccine  Aged Out    Meningococcal ACWY Vaccine  Aged Out    HPV Vaccine  Aged [employer] History   Administered Date(s) Administered    SARS-CoV-2 / COVID-19 mRNA IM (Pfizer-BioNTech) 02/17/2021, 03/11/2021         Shana Interiano MD  8/31/2021,3:27 PM

## 2021-09-03 ENCOUNTER — APPOINTMENT (OUTPATIENT)
Dept: LAB | Facility: CLINIC | Age: 36
End: 2021-09-03
Payer: COMMERCIAL

## 2021-09-03 DIAGNOSIS — L20.84 INTRINSIC ECZEMA: ICD-10-CM

## 2021-09-03 DIAGNOSIS — Z11.4 ENCOUNTER FOR SCREENING FOR HIV: ICD-10-CM

## 2021-09-03 DIAGNOSIS — Z11.59 NEED FOR HEPATITIS C SCREENING TEST: ICD-10-CM

## 2021-09-03 DIAGNOSIS — E55.9 VITAMIN D DEFICIENCY: ICD-10-CM

## 2021-09-03 DIAGNOSIS — Z13.6 SCREENING FOR CARDIOVASCULAR CONDITION: ICD-10-CM

## 2021-09-03 DIAGNOSIS — F41.1 GENERALIZED ANXIETY DISORDER: ICD-10-CM

## 2021-09-03 LAB
25(OH)D3 SERPL-MCNC: 36 NG/ML (ref 30–100)
ALBUMIN SERPL BCP-MCNC: 4.1 G/DL (ref 3.5–5)
ALP SERPL-CCNC: 57 U/L (ref 46–116)
ALT SERPL W P-5'-P-CCNC: 26 U/L (ref 12–78)
ANION GAP SERPL CALCULATED.3IONS-SCNC: 8 MMOL/L (ref 4–13)
AST SERPL W P-5'-P-CCNC: 18 U/L (ref 5–45)
BACTERIA UR QL AUTO: NORMAL /HPF
BASOPHILS # BLD AUTO: 0.05 THOUSANDS/ΜL (ref 0–0.1)
BASOPHILS NFR BLD AUTO: 1 % (ref 0–1)
BILIRUB SERPL-MCNC: 1.2 MG/DL (ref 0.2–1)
BILIRUB UR QL STRIP: NEGATIVE
BUN SERPL-MCNC: 10 MG/DL (ref 5–25)
CALCIUM SERPL-MCNC: 9.3 MG/DL (ref 8.3–10.1)
CHLORIDE SERPL-SCNC: 104 MMOL/L (ref 100–108)
CHOLEST SERPL-MCNC: 174 MG/DL (ref 50–200)
CLARITY UR: CLEAR
CO2 SERPL-SCNC: 26 MMOL/L (ref 21–32)
COLOR UR: YELLOW
CREAT SERPL-MCNC: 1.01 MG/DL (ref 0.6–1.3)
EOSINOPHIL # BLD AUTO: 0.33 THOUSAND/ΜL (ref 0–0.61)
EOSINOPHIL NFR BLD AUTO: 5 % (ref 0–6)
ERYTHROCYTE [DISTWIDTH] IN BLOOD BY AUTOMATED COUNT: 12.2 % (ref 11.6–15.1)
GFR SERPL CREATININE-BSD FRML MDRD: 96 ML/MIN/1.73SQ M
GLUCOSE P FAST SERPL-MCNC: 86 MG/DL (ref 65–99)
GLUCOSE UR STRIP-MCNC: NEGATIVE MG/DL
HCT VFR BLD AUTO: 45.9 % (ref 36.5–49.3)
HCV AB SER QL: NORMAL
HDLC SERPL-MCNC: 71 MG/DL
HGB BLD-MCNC: 15.6 G/DL (ref 12–17)
HGB UR QL STRIP.AUTO: NEGATIVE
IMM GRANULOCYTES # BLD AUTO: 0.02 THOUSAND/UL (ref 0–0.2)
IMM GRANULOCYTES NFR BLD AUTO: 0 % (ref 0–2)
KETONES UR STRIP-MCNC: NEGATIVE MG/DL
LDLC SERPL CALC-MCNC: 86 MG/DL (ref 0–100)
LEUKOCYTE ESTERASE UR QL STRIP: NEGATIVE
LYMPHOCYTES # BLD AUTO: 2.04 THOUSANDS/ΜL (ref 0.6–4.47)
LYMPHOCYTES NFR BLD AUTO: 31 % (ref 14–44)
MCH RBC QN AUTO: 32.4 PG (ref 26.8–34.3)
MCHC RBC AUTO-ENTMCNC: 34 G/DL (ref 31.4–37.4)
MCV RBC AUTO: 95 FL (ref 82–98)
MONOCYTES # BLD AUTO: 0.65 THOUSAND/ΜL (ref 0.17–1.22)
MONOCYTES NFR BLD AUTO: 10 % (ref 4–12)
NEUTROPHILS # BLD AUTO: 3.54 THOUSANDS/ΜL (ref 1.85–7.62)
NEUTS SEG NFR BLD AUTO: 53 % (ref 43–75)
NITRITE UR QL STRIP: NEGATIVE
NON-SQ EPI CELLS URNS QL MICRO: NORMAL /HPF
NONHDLC SERPL-MCNC: 103 MG/DL
NRBC BLD AUTO-RTO: 0 /100 WBCS
PH UR STRIP.AUTO: 7 [PH]
PLATELET # BLD AUTO: 221 THOUSANDS/UL (ref 149–390)
PMV BLD AUTO: 9.4 FL (ref 8.9–12.7)
POTASSIUM SERPL-SCNC: 4 MMOL/L (ref 3.5–5.3)
PROT SERPL-MCNC: 7.4 G/DL (ref 6.4–8.2)
PROT UR STRIP-MCNC: NEGATIVE MG/DL
RBC # BLD AUTO: 4.82 MILLION/UL (ref 3.88–5.62)
RBC #/AREA URNS AUTO: NORMAL /HPF
SODIUM SERPL-SCNC: 138 MMOL/L (ref 136–145)
SP GR UR STRIP.AUTO: 1.01 (ref 1–1.03)
TRIGL SERPL-MCNC: 84 MG/DL
TSH SERPL DL<=0.05 MIU/L-ACNC: 1.14 UIU/ML (ref 0.36–3.74)
UROBILINOGEN UR QL STRIP.AUTO: 0.2 E.U./DL
WBC # BLD AUTO: 6.63 THOUSAND/UL (ref 4.31–10.16)
WBC #/AREA URNS AUTO: NORMAL /HPF

## 2021-09-03 PROCEDURE — 80061 LIPID PANEL: CPT

## 2021-09-03 PROCEDURE — 85025 COMPLETE CBC W/AUTO DIFF WBC: CPT

## 2021-09-03 PROCEDURE — 36415 COLL VENOUS BLD VENIPUNCTURE: CPT

## 2021-09-03 PROCEDURE — 81001 URINALYSIS AUTO W/SCOPE: CPT | Performed by: INTERNAL MEDICINE

## 2021-09-03 PROCEDURE — 84443 ASSAY THYROID STIM HORMONE: CPT

## 2021-09-03 PROCEDURE — 82306 VITAMIN D 25 HYDROXY: CPT

## 2021-09-03 PROCEDURE — 80053 COMPREHEN METABOLIC PANEL: CPT

## 2021-09-03 PROCEDURE — 86803 HEPATITIS C AB TEST: CPT

## 2021-09-03 PROCEDURE — 87389 HIV-1 AG W/HIV-1&-2 AB AG IA: CPT

## 2021-09-05 LAB — HIV 1+2 AB+HIV1 P24 AG SERPL QL IA: NORMAL

## 2021-11-23 ENCOUNTER — APPOINTMENT (OUTPATIENT)
Dept: LAB | Facility: CLINIC | Age: 36
End: 2021-11-23
Payer: COMMERCIAL

## 2021-11-23 ENCOUNTER — OFFICE VISIT (OUTPATIENT)
Dept: INTERNAL MEDICINE CLINIC | Facility: CLINIC | Age: 36
End: 2021-11-23
Payer: COMMERCIAL

## 2021-11-23 VITALS
SYSTOLIC BLOOD PRESSURE: 130 MMHG | RESPIRATION RATE: 18 BRPM | TEMPERATURE: 98.1 F | HEART RATE: 80 BPM | WEIGHT: 188.8 LBS | HEIGHT: 74 IN | BODY MASS INDEX: 24.23 KG/M2 | OXYGEN SATURATION: 98 % | DIASTOLIC BLOOD PRESSURE: 78 MMHG

## 2021-11-23 DIAGNOSIS — N48.89 PENILE IRRITATION: ICD-10-CM

## 2021-11-23 DIAGNOSIS — R30.0 DYSURIA: ICD-10-CM

## 2021-11-23 DIAGNOSIS — R30.0 DYSURIA: Primary | ICD-10-CM

## 2021-11-23 PROBLEM — R68.82 DECREASED LIBIDO: Status: RESOLVED | Noted: 2021-08-31 | Resolved: 2021-11-23

## 2021-11-23 PROBLEM — R36.1 BLOODY EJACULATION: Status: RESOLVED | Noted: 2021-08-31 | Resolved: 2021-11-23

## 2021-11-23 PROBLEM — E55.9 VITAMIN D DEFICIENCY: Status: RESOLVED | Noted: 2019-05-10 | Resolved: 2021-11-23

## 2021-11-23 LAB
BILIRUB UR QL STRIP: NEGATIVE
CLARITY UR: CLEAR
COLOR UR: YELLOW
GLUCOSE UR STRIP-MCNC: NEGATIVE MG/DL
HGB UR QL STRIP.AUTO: NEGATIVE
KETONES UR STRIP-MCNC: NEGATIVE MG/DL
LEUKOCYTE ESTERASE UR QL STRIP: NEGATIVE
NITRITE UR QL STRIP: NEGATIVE
PH UR STRIP.AUTO: 7 [PH]
PROT UR STRIP-MCNC: NEGATIVE MG/DL
SP GR UR STRIP.AUTO: 1.01 (ref 1–1.03)
UROBILINOGEN UR QL STRIP.AUTO: 0.2 E.U./DL

## 2021-11-23 PROCEDURE — 87491 CHLMYD TRACH DNA AMP PROBE: CPT

## 2021-11-23 PROCEDURE — 3008F BODY MASS INDEX DOCD: CPT | Performed by: NURSE PRACTITIONER

## 2021-11-23 PROCEDURE — 87591 N.GONORRHOEAE DNA AMP PROB: CPT

## 2021-11-23 PROCEDURE — 99213 OFFICE O/P EST LOW 20 MIN: CPT | Performed by: NURSE PRACTITIONER

## 2021-11-23 PROCEDURE — 81003 URINALYSIS AUTO W/O SCOPE: CPT | Performed by: NURSE PRACTITIONER

## 2021-11-23 PROCEDURE — 87086 URINE CULTURE/COLONY COUNT: CPT

## 2021-11-23 RX ORDER — DIAPER,BRIEF,INFANT-TODD,DISP
EACH MISCELLANEOUS DAILY
Qty: 30 G | Refills: 0 | Status: SHIPPED | OUTPATIENT
Start: 2021-11-23 | End: 2022-02-28

## 2021-11-23 RX ORDER — CETIRIZINE HYDROCHLORIDE 10 MG/1
10 TABLET ORAL DAILY
COMMUNITY

## 2021-11-24 LAB
BACTERIA UR CULT: NORMAL
C TRACH DNA SPEC QL NAA+PROBE: NEGATIVE
N GONORRHOEA DNA SPEC QL NAA+PROBE: NEGATIVE

## 2021-12-01 ENCOUNTER — OFFICE VISIT (OUTPATIENT)
Dept: UROLOGY | Facility: CLINIC | Age: 36
End: 2021-12-01
Payer: COMMERCIAL

## 2021-12-01 VITALS
HEIGHT: 74 IN | BODY MASS INDEX: 24.77 KG/M2 | DIASTOLIC BLOOD PRESSURE: 64 MMHG | SYSTOLIC BLOOD PRESSURE: 112 MMHG | WEIGHT: 193 LBS

## 2021-12-01 DIAGNOSIS — N41.9 PROSTATITIS, UNSPECIFIED PROSTATITIS TYPE: ICD-10-CM

## 2021-12-01 DIAGNOSIS — R36.1 BLOODY EJACULATION: Primary | ICD-10-CM

## 2021-12-01 DIAGNOSIS — R68.82 DECREASED LIBIDO: ICD-10-CM

## 2021-12-01 LAB
BACTERIA UR QL AUTO: NORMAL /HPF
BILIRUB UR QL STRIP: NEGATIVE
CLARITY UR: CLEAR
COLOR UR: YELLOW
GLUCOSE UR STRIP-MCNC: NEGATIVE MG/DL
HGB UR QL STRIP.AUTO: NEGATIVE
HYALINE CASTS #/AREA URNS LPF: NORMAL /LPF
KETONES UR STRIP-MCNC: NEGATIVE MG/DL
LEUKOCYTE ESTERASE UR QL STRIP: NEGATIVE
NITRITE UR QL STRIP: NEGATIVE
NON-SQ EPI CELLS URNS QL MICRO: NORMAL /HPF
PH UR STRIP.AUTO: 7.5 [PH]
PROT UR STRIP-MCNC: NEGATIVE MG/DL
RBC #/AREA URNS AUTO: NORMAL /HPF
SL AMB  POCT GLUCOSE, UA: NORMAL
SL AMB LEUKOCYTE ESTERASE,UA: NORMAL
SL AMB POCT BILIRUBIN,UA: NORMAL
SL AMB POCT BLOOD,UA: NORMAL
SL AMB POCT CLARITY,UA: CLEAR
SL AMB POCT COLOR,UA: YELLOW
SL AMB POCT KETONES,UA: NORMAL
SL AMB POCT NITRITE,UA: NORMAL
SL AMB POCT PH,UA: 6.5
SL AMB POCT SPECIFIC GRAVITY,UA: 1
SL AMB POCT URINE PROTEIN: NORMAL
SL AMB POCT UROBILINOGEN: 0.2
SP GR UR STRIP.AUTO: 1.01 (ref 1–1.03)
UROBILINOGEN UR QL STRIP.AUTO: 0.2 E.U./DL
WBC #/AREA URNS AUTO: NORMAL /HPF

## 2021-12-01 PROCEDURE — 99203 OFFICE O/P NEW LOW 30 MIN: CPT | Performed by: PHYSICIAN ASSISTANT

## 2021-12-01 PROCEDURE — 87086 URINE CULTURE/COLONY COUNT: CPT | Performed by: PHYSICIAN ASSISTANT

## 2021-12-01 PROCEDURE — 81002 URINALYSIS NONAUTO W/O SCOPE: CPT | Performed by: PHYSICIAN ASSISTANT

## 2021-12-01 PROCEDURE — 81001 URINALYSIS AUTO W/SCOPE: CPT | Performed by: PHYSICIAN ASSISTANT

## 2021-12-01 RX ORDER — CIPROFLOXACIN 500 MG/1
500 TABLET, FILM COATED ORAL 2 TIMES DAILY
Qty: 14 TABLET | Refills: 0 | Status: SHIPPED | OUTPATIENT
Start: 2021-12-01 | End: 2021-12-08

## 2021-12-02 LAB — BACTERIA UR CULT: NORMAL

## 2021-12-10 ENCOUNTER — OFFICE VISIT (OUTPATIENT)
Dept: UROLOGY | Facility: CLINIC | Age: 36
End: 2021-12-10
Payer: COMMERCIAL

## 2021-12-10 VITALS
HEIGHT: 74 IN | WEIGHT: 188 LBS | HEART RATE: 88 BPM | DIASTOLIC BLOOD PRESSURE: 74 MMHG | BODY MASS INDEX: 24.13 KG/M2 | SYSTOLIC BLOOD PRESSURE: 122 MMHG

## 2021-12-10 DIAGNOSIS — R10.2 CHRONIC PELVIC PAIN IN MALE: ICD-10-CM

## 2021-12-10 DIAGNOSIS — N34.2 URETHRITIS: ICD-10-CM

## 2021-12-10 DIAGNOSIS — R10.2 PELVIC PAIN: Primary | ICD-10-CM

## 2021-12-10 DIAGNOSIS — G89.29 CHRONIC PELVIC PAIN IN MALE: ICD-10-CM

## 2021-12-10 PROCEDURE — 99214 OFFICE O/P EST MOD 30 MIN: CPT | Performed by: PHYSICIAN ASSISTANT

## 2021-12-10 PROCEDURE — 3008F BODY MASS INDEX DOCD: CPT | Performed by: PHYSICIAN ASSISTANT

## 2021-12-10 RX ORDER — HYDROXYZINE HYDROCHLORIDE 10 MG/1
10 TABLET, FILM COATED ORAL
Qty: 30 TABLET | Refills: 2 | Status: SHIPPED | OUTPATIENT
Start: 2021-12-10 | End: 2022-02-28

## 2021-12-10 RX ORDER — AZITHROMYCIN 1 G
1 PACKET (EA) ORAL ONCE
Qty: 1 EACH | Refills: 0 | Status: SHIPPED | OUTPATIENT
Start: 2021-12-10 | End: 2021-12-10

## 2021-12-14 ENCOUNTER — APPOINTMENT (OUTPATIENT)
Dept: LAB | Facility: CLINIC | Age: 36
End: 2021-12-14
Payer: COMMERCIAL

## 2021-12-14 ENCOUNTER — HOSPITAL ENCOUNTER (OUTPATIENT)
Dept: ULTRASOUND IMAGING | Facility: CLINIC | Age: 36
Discharge: HOME | End: 2021-12-14
Payer: COMMERCIAL

## 2021-12-14 DIAGNOSIS — R10.2 CHRONIC PELVIC PAIN IN MALE: ICD-10-CM

## 2021-12-14 DIAGNOSIS — R10.2 PELVIC PAIN: ICD-10-CM

## 2021-12-14 DIAGNOSIS — G89.29 CHRONIC PELVIC PAIN IN MALE: ICD-10-CM

## 2021-12-14 PROCEDURE — 87086 URINE CULTURE/COLONY COUNT: CPT | Performed by: PHYSICIAN ASSISTANT

## 2021-12-14 PROCEDURE — 76770 US EXAM ABDO BACK WALL COMP: CPT

## 2021-12-14 PROCEDURE — 86695 HERPES SIMPLEX TYPE 1 TEST: CPT

## 2021-12-14 PROCEDURE — 86696 HERPES SIMPLEX TYPE 2 TEST: CPT

## 2021-12-15 ENCOUNTER — TELEPHONE (OUTPATIENT)
Dept: UROLOGY | Facility: CLINIC | Age: 36
End: 2021-12-15

## 2021-12-15 DIAGNOSIS — N34.2 URETHRITIS: ICD-10-CM

## 2021-12-15 DIAGNOSIS — B00.9 HERPES: Primary | ICD-10-CM

## 2021-12-15 LAB
BACTERIA UR CULT: NORMAL
HSV1 IGG SER IA-ACNC: 16.2 INDEX (ref 0–0.9)
HSV2 IGG SER IA-ACNC: <0.91 INDEX (ref 0–0.9)

## 2021-12-15 RX ORDER — VALACYCLOVIR HYDROCHLORIDE 1 G/1
1000 TABLET, FILM COATED ORAL 2 TIMES DAILY
Qty: 20 TABLET | Refills: 0 | Status: SHIPPED | OUTPATIENT
Start: 2021-12-15 | End: 2022-02-28

## 2021-12-21 ENCOUNTER — TELEPHONE (OUTPATIENT)
Dept: UROLOGY | Facility: CLINIC | Age: 36
End: 2021-12-21

## 2022-01-27 NOTE — PROGRESS NOTES
1/28/2022      Chief Complaint   Patient presents with    Pelvic/bladder pain    Penile pain/hypersensitivity     Assessment and Plan    1  Intermittent pelvic/bladder pain  2  Penile pain/hypersensitivity  - Was empirically treated for urethritis with a course of azithromycin with resolution of dysuria  Also had positive herpes testing and was treated with course of Valtrex  Reports above symptoms have continued  - Will treat for pelvic pain syndrome with pelvic floor physical therapy and trial of hydroxyzine  - US kidney/bladder - normal    - Could potentially consider cystoscopy for ongoing issues  - Consider lumbar spinal imaging    - F/u in 3 months for symptom reassessment  History of Present Illness  Isaias Dubose is a 29 y o  male here for follow up evaluation of penile and pelvic pain  Patient was initially treated for presumed prostatitis with a course of ciprofloxacin  He reported this worsened his symptoms  More recently was treated empirically with azithromycin for urethritis  All urine testing has been negative for UTI  He did have a positive herpes test and was treated with course of Valtrex  Other STD testing was negative  He reports burning with urination has resolved  He reports continued bladder and pelvic pain as well as penile pain and hypersensitivity  He reports when he gets out of the shower often times half of his penis is swollen and red  He states this typically resolves  Denies any rashes  He obtained an ultrasound of the kidney and bladder which was negative for any urologic abnormalities  Prostate exam a previous visit was unremarkable  Pertinent medical history includes several lumbar disc herniations as well as generalized anxiety disorder  PVR=0 mL     Review of Systems   Constitutional: Negative for chills and fever  Respiratory: Negative for shortness of breath  Cardiovascular: Negative for chest pain  Gastrointestinal: Negative for abdominal pain  Genitourinary: Positive for penile pain and penile swelling  Negative for difficulty urinating, dysuria, flank pain, frequency, hematuria, penile discharge, testicular pain and urgency  Neurological: Negative for dizziness  AUA SYMPTOM SCORE      Most Recent Value   AUA SYMPTOM SCORE    How often have you had a sensation of not emptying your bladder completely after you finished urinating? 1 (P)     How often have you had to urinate again less than two hours after you finished urinating? 5 (P)     How often have you found you stopped and started again several times when you urinate? 4 (P)     How often have you found it difficult to postpone urination? 0 (P)     How often have you had a weak urinary stream? 1 (P)     How often have you had to push or strain to begin urination? 0 (P)     How many times did you most typically get up to urinate from the time you went to bed at night until the time you got up in the morning? 1 (P)     Quality of Life: If you were to spend the rest of your life with your urinary condition just the way it is now, how would you feel about that? 5 (P)     AUA SYMPTOM SCORE 12 (P)              Past Medical History  Past Medical History:   Diagnosis Date    Back pain     Bloody ejaculation     Celiac disease     Depression        Past Social History  History reviewed  No pertinent surgical history    Social History     Tobacco Use   Smoking Status Never Smoker   Smokeless Tobacco Never Used       Past Family History  Family History   Problem Relation Age of Onset    Diabetes Maternal Grandmother     Heart disease Paternal Grandfather     No Known Problems Mother     No Known Problems Father        Past Social history  Social History     Socioeconomic History    Marital status: /Civil Union     Spouse name: Not on file    Number of children: Not on file    Years of education: Not on file    Highest education level: Not on file   Occupational History    Not on file   Tobacco Use    Smoking status: Never Smoker    Smokeless tobacco: Never Used   Vaping Use    Vaping Use: Never used   Substance and Sexual Activity    Alcohol use: Yes    Drug use: Never    Sexual activity: Yes   Other Topics Concern    Not on file   Social History Narrative    Not on file     Social Determinants of Health     Financial Resource Strain: Not on file   Food Insecurity: Not on file   Transportation Needs: Not on file   Physical Activity: Inactive    Days of Exercise per Week: 0 days    Minutes of Exercise per Session: 0 min   Stress: Stress Concern Present    Feeling of Stress : To some extent   Social Connections: Not on file   Intimate Partner Violence: Not on file   Housing Stability: Not on file       Current Medications  Current Outpatient Medications   Medication Sig Dispense Refill    busPIRone (BUSPAR) 5 mg tablet Take 1 tablet (5 mg total) by mouth 2 (two) times a day 180 tablet 3    cetirizine (ZyrTEC) 10 mg tablet Take 10 mg by mouth daily      hydrOXYzine HCL (ATARAX) 10 mg tablet Take 1 tablet (10 mg total) by mouth daily at bedtime 30 tablet 2    multivitamin (THERAGRAN) TABS Take 1 tablet by mouth daily      hydrocortisone 1 % cream Apply topically daily (Patient not taking: Reported on 12/10/2021 ) 30 g 0    valACYclovir (VALTREX) 1,000 mg tablet Take 1 tablet (1,000 mg total) by mouth 2 (two) times a day for 10 days 20 tablet 0     No current facility-administered medications for this visit         Allergies  Allergies   Allergen Reactions    Bactrim [Sulfamethoxazole-Trimethoprim] Hives         The following portions of the patient's history were reviewed and updated as appropriate: allergies, current medications, past medical history, past social history, past surgical history and problem list       Vitals  Vitals:    01/28/22 1535   Pulse: 89   SpO2: 99%   Weight: 83 9 kg (185 lb)   Height: 6' 2" (1 88 m)           Physical Exam  Physical Exam  Constitutional:       Appearance: Normal appearance  He is normal weight  HENT:      Head: Normocephalic and atraumatic  Right Ear: External ear normal       Left Ear: External ear normal    Eyes:      General: No scleral icterus  Conjunctiva/sclera: Conjunctivae normal    Cardiovascular:      Pulses: Normal pulses  Pulmonary:      Effort: Pulmonary effort is normal    Genitourinary:     Penis: Normal     Musculoskeletal:         General: Normal range of motion  Cervical back: Normal range of motion  Skin:     General: Skin is warm and dry  Neurological:      General: No focal deficit present  Mental Status: He is alert and oriented to person, place, and time  Psychiatric:         Mood and Affect: Mood normal          Behavior: Behavior normal          Thought Content: Thought content normal          Judgment: Judgment normal            Results  No results found for this or any previous visit (from the past 1 hour(s))  ]  No results found for: PSA  Lab Results   Component Value Date    CALCIUM 9 3 09/03/2021    K 4 0 09/03/2021    CO2 26 09/03/2021     09/03/2021    BUN 10 09/03/2021    CREATININE 1 01 09/03/2021     Lab Results   Component Value Date    WBC 6 63 09/03/2021    HGB 15 6 09/03/2021    HCT 45 9 09/03/2021    MCV 95 09/03/2021     09/03/2021           Orders  Orders Placed This Encounter   Procedures    POCT Measure PVR       Meliton Miranda PA-C

## 2022-01-28 ENCOUNTER — OFFICE VISIT (OUTPATIENT)
Dept: UROLOGY | Facility: CLINIC | Age: 37
End: 2022-01-28
Payer: COMMERCIAL

## 2022-01-28 VITALS — HEART RATE: 89 BPM | BODY MASS INDEX: 23.74 KG/M2 | WEIGHT: 185 LBS | HEIGHT: 74 IN | OXYGEN SATURATION: 99 %

## 2022-01-28 DIAGNOSIS — R36.1 BLOODY EJACULATION: Primary | ICD-10-CM

## 2022-01-28 LAB — POST-VOID RESIDUAL VOLUME, ML POC: 0 ML

## 2022-01-28 PROCEDURE — 51798 US URINE CAPACITY MEASURE: CPT | Performed by: PHYSICIAN ASSISTANT

## 2022-01-28 PROCEDURE — 3008F BODY MASS INDEX DOCD: CPT | Performed by: PHYSICIAN ASSISTANT

## 2022-01-28 PROCEDURE — 99213 OFFICE O/P EST LOW 20 MIN: CPT | Performed by: PHYSICIAN ASSISTANT

## 2022-02-03 ENCOUNTER — EVALUATION (OUTPATIENT)
Dept: PHYSICAL THERAPY | Facility: REHABILITATION | Age: 37
End: 2022-02-03
Payer: COMMERCIAL

## 2022-02-03 DIAGNOSIS — M62.89 PELVIC FLOOR DYSFUNCTION: ICD-10-CM

## 2022-02-03 DIAGNOSIS — G89.29 CHRONIC PELVIC PAIN IN MALE: Primary | ICD-10-CM

## 2022-02-03 DIAGNOSIS — R10.2 CHRONIC PELVIC PAIN IN MALE: Primary | ICD-10-CM

## 2022-02-03 PROCEDURE — 97162 PT EVAL MOD COMPLEX 30 MIN: CPT | Performed by: PHYSICAL THERAPIST

## 2022-02-03 PROCEDURE — 97530 THERAPEUTIC ACTIVITIES: CPT | Performed by: PHYSICAL THERAPIST

## 2022-02-03 NOTE — PROGRESS NOTES
PT Evaluation     Today's date: 2/3/2022  Patient name: Elizabeth Ervin  : 1985  MRN: [id number]  Referring provider: Edin Krueger  Dx:   Encounter Diagnosis     ICD-10-CM    1  Chronic pelvic pain in male  R10 2     G89 29    2  Pelvic floor dysfunction  M62 89        Start Time: 1500  Stop Time: 1600  Total time in clinic (min): 60 minutes    Assessment  Assessment details: The patient is a 29 y o  male with complaints of penile pain and dysuria  He also complains of pain with sitting and pain/tension during intercourse  He does have a history of herniated discs in his lumbar spine with radiculopathy symptoms but none recently  He notes worsening of his current symptoms with stress and prolonged sitting  He presents with tightness in his hips with assessment today  Perineal palpation was not tender or did not reproduce symptoms  He also demonstrates tenderness around his umbilicus to the right which reproduces some bladder and urethral pain  Rectal internal pelvic floor muscle exam will be performed next visit  He would benefit from pelvic floor physical therapy to help reduce/manage pain and symptoms, address impairments and maximize function and quality of life upon discharge  male will be given updated HEP throughout episode of care  Thank you for the referral     Therapeutic activities performed upon examination included education regarding pelvic floor anatomy, explanation of exam technique, explanation of exam findings and discussion of treatment plan as well as expectations of the patient to emphasize the importance of compliance and adherence to physical therapy visits  Impairments: abnormal muscle tone, abnormal or restricted ROM, activity intolerance, impaired physical strength, lacks appropriate home exercise program and pain with function  Understanding of Dx/Px/POC: good   Prognosis: good    Goals  ST   The patient will reduce pelvic and penile pain by 25 to 50% in 8 visits  2  The patient will reduce pelvic floor muscle tone by 50% in 8 visits  3  The patient will improve hip ROM by 5 to 10 degrees in 8 visits  LT  The patient will normalize pelvic floor muscle tone upon discharge  2  The patient will be able to recruit his pelvic floor muscles without pain or tension holding upon discharge  3  The patient will be able to tolerate sitting with minimal to no pain upon discharge  4  The patient will return to previous activity level without pain upon discharge  5  The patient will be able to have intercourse with minimal discomfort upon discharge  Plan  Patient would benefit from: skilled PT  Planned modality interventions: biofeedback and ultrasound (Real Time Ultrasound)  Planned therapy interventions: manual therapy, neuromuscular re-education, patient education, strengthening, therapeutic exercise, therapeutic training, home exercise program, abdominal trunk stabilization, self care, postural training, therapeutic activities and stretching  Frequency: 1x week  Duration in visits: 12  Duration in weeks: 12  Plan of Care beginning date: 2/3/2022  Plan of Care expiration date: 2022  Treatment plan discussed with: patient        PT Pelvic Floor Subjective:   History of Present Illness: The patient reports that in 2021 he began to experience intermittent shooting pains in his bladder but this got progressively worse to more of a constant dull ache  He traveled for work and while he was away and upon return his symptoms worsened  He developed sensitivity in his penis which caused tightness and urinary symptoms including pain with urination specifically pulling and a "threading" sensation through the urethra  He is a  and he does sit all day and that the sitting makes his symptoms worse  He did some research and started stretching and walking daily   He did see the Urologist in regards to his symptoms and he had some testing done which was all within normal limits  He does note that his symptoms are worse on high stress days  Sensitivity at rim of head of penis; redness around head of penis during sexual activity and during a shower; occasionally swelling (sometimes one sided around head of penis)  Pale of patch of skin at one point which is mostly better  Previously pain with ejaculation; much improved   tightness with erection  Pain improved at night  Pain radiates into thighs frequently into groin    No history of hernias  No UTIs     Social Support:     Work status: employed full time (patient works from home - )  Diet and Exercise:      Exercise type: walking    Walking and stretching daily  Co-morbidities:    Sciatic nerve pain since college - has had MRI's which showed HNP's; he did go to PT twice (once in college and once 6-7 years ago) and also had injections; no recent flare ups of pain  Bladder Function:     Voiding Difficulties positive for: hesitancy (occasionally) and incomplete emptying      Voiding Difficulties negative for: urgency, frequent urination and straining      Voiding Difficulties comments:     Voiding frequency: every 1-2 hours    Urinary leakage: urine leakage    Urinary leakage aggravated by: post-void dribble    Nocturia (episodes per night): 0    Painful urination: Yes (on occasion)      Fluid Intake Type:  Coffee and water    Intake (ounces):      Intake (ounces) comment: Coffee: 16 ounces daily  Water: 3, 24 ounce water bottles a day  [place] water    100 ounces total a day  Bowel Function:     Bowel Function comments:  No constipation or straining  Occasional jolt of pain during bowel movement    Bowel frequency: daily  Sexual Function:     Sexually Active:  Sexually active  Pain:     Current pain rating:  3    At worst pain ratin    Quality: raw feeling; pain occasionally radiates up towards belly button (sharp)    Exacerbated by: sitting; tight fitting clothing; stress; sex; urination  Relieving factors:  Change in position (stretching/walking; relief after ejaculation for a few hours; lying on side)      Objective     Static Posture     Head  Forward  Shoulders  Rounded  Lumbar Spine   Increased lordosis  Neurological Testing     Sensation     Lumbar   Left   Intact: light touch    Right   Intact: light touch    Hip   Left Hip   Intact: light touch  Diminished: light touch    Right Hip   Intact: light touch    Comments   Left light touch: L3      Reflexes   Left   Patellar (L4): normal (2+)  Achilles (S1): normal (2+)    Right   Patellar (L4): normal (2+)  Achilles (S1): normal (2+)    Active Range of Motion     Lumbar   Flexion:  WFL  Left lateral flexion:  WFL  Right lateral flexion:  WFL  Left rotation:  Restriction level: minimal  Right rotation:  Restriction level: minimal  Left Hip   Flexion: 110 degrees     Right Hip   Flexion: 110 degrees     Joint Play     Hypomobile: L2, L3, L4, L5 and S1     Pain: L2, L3 and L4   L2 comments: localized and radiated into right glute  L3 comments: localized and radiated into right glute  L4 comments: localized and radiated into right glute    Strength/Myotome Testing     Left Hip   Planes of Motion   Flexion: 4  Extension: 4  Abduction: 4  External rotation: 4  Internal rotation: 4    Right Hip   Planes of Motion   Flexion: 4  Extension: 4  Abduction: 4  External rotation: 4  Internal rotation: 4    Left Knee   Flexion: 4  Extension: 4    Right Knee   Flexion: 4  Extension: 4    Left Ankle/Foot   Dorsiflexion: 4  Plantar flexion: 4    Right Ankle/Foot   Dorsiflexion: 4  Plantar flexion: 4    Additional Strength Details  Able to heel walk/toe walk without difficulty     Tests     Lumbar   Negative SIJ compression, sacroiliac distraction and sacral spring   Left   Positive passive SLR  Negative crossed SLR  Right   Negative crossed SLR and passive SLR       Left Pelvic Girdle/Sacrum   Negative: active SLR test      Right Pelvic Girdle/Sacrum   Negative: active SLR test      Left Hip   Negative JOSSE, FADIR, scour and SI compression  Right Hip   Negative JOSSE, FADIR, scour and SI compression  Additional Tests Details  + L anterior hip/groin pain with passive SLR > 60 deg; Passive hip flexion > 100 degrees     General Comments:      Lumbar Comments  + pain through the penis with resistive gluteus cristal strengthening  Pelvic Floor Exam   Position: supine exam  Abdominal assessment: Tenderness to palpation to right of umbilicus and inferior to umbilicus    Diastatis   tenderness at linea alba    General Perineum Exam:   perineum intact  Negative for swelling, lesion, rectal irritation and perianal erythema    General perineum exam comments: Pelvic floor verbal consent and written consent signed and in chart    Education provided today:   Time Spent on Patient Education: 20 min    Pelvic floor anatomy and function  Physiology/relationship of abdominal canister and pelvis/pelvic organs/pelvic floor muscles  Diaphragm and Diaphragmatic breathing  Bowel and Bladder anatomy and function    PT exam and course of treatment  Bladder and Bowel diary     Pelvic Clock:   Ttp: none over perineal body; STP; DTP; Ischiocavernosus; Bulbospongiosus ; proximal hip adductors; inguinal canal    Visual Inspection of Perineum:   Excursion of perineal body in cephalad direction with contraction of pelvic floor muscles (PFM): weak  Cotton swab test: non-tender  Cough reflex: anal wink reflex present    Sensation: intact                     Precautions: anxiety  [employer] HEP:    Manuals 2/3                         STM mobilization             Abdominal Tri Planar Myofascial release             Pelvic Floor Muscle Releases                                       Patient Education             Neuro Re-Ed             Real Time Ultrasound             TA ADIM             PFMC slow holds             Diaphragmatic Breathing             DKC with Diaphragmatic Breathing             Child's Pose with DiaphragmaticBreathing             Body Scan for PFM release/relaxatoin             Pelvic Floor Drops in deep squat                                                                              Ther Ex             Hamstring stretch             Piriformis stretch             Hip adductor/groin stretch                                                                                                                                  Ther Activity             EDUCATION/COUNSELING             Dilator Training             Gait Training                                       Modalities

## 2022-02-14 ENCOUNTER — OFFICE VISIT (OUTPATIENT)
Dept: PHYSICAL THERAPY | Facility: REHABILITATION | Age: 37
End: 2022-02-14
Payer: COMMERCIAL

## 2022-02-14 DIAGNOSIS — R10.2 CHRONIC PELVIC PAIN IN MALE: Primary | ICD-10-CM

## 2022-02-14 DIAGNOSIS — M62.89 PELVIC FLOOR DYSFUNCTION: ICD-10-CM

## 2022-02-14 DIAGNOSIS — G89.29 CHRONIC PELVIC PAIN IN MALE: Primary | ICD-10-CM

## 2022-02-14 PROCEDURE — 97112 NEUROMUSCULAR REEDUCATION: CPT | Performed by: PHYSICAL THERAPIST

## 2022-02-14 PROCEDURE — 97110 THERAPEUTIC EXERCISES: CPT | Performed by: PHYSICAL THERAPIST

## 2022-02-14 PROCEDURE — 97140 MANUAL THERAPY 1/> REGIONS: CPT | Performed by: PHYSICAL THERAPIST

## 2022-02-14 NOTE — PROGRESS NOTES
Daily Note     Today's date: 2022  Patient name: Jenn Garcia  : 1985  MRN: [id number]  Referring provider: Mery Arellano  Dx:   Encounter Diagnosis     ICD-10-CM    1  Chronic pelvic pain in male  R10 2     G89 29    2  Pelvic floor dysfunction  M62 89        Start Time: 1500  Stop Time: 1600  Total time in clinic (min): 60 minutes    Subjective: The patient notes that he has had good and bad days over the past week  He notes that he had a few days with little to no pain and then a few days in which he experienced stress and his symptoms and pain were exacerbated  He has been performing exercises independently which have been helpful at home  He also sees a therapist as well and they work on strategies of reducing stress  Objective: See treatment diary below    Leg length assessment: no apparent leg leg discrepancy due to pelvic innominant   Slump test for sciatic neural tension: (-)      Assessment: Tolerated treatment fair  Patient pelvic floor mucles assessed internally today in prone position  He had no reports of pain or reproduction of symptoms  He had mild tension in the pelvic floor muscles  He was able to contract his muscles well, a little difficulty with lengthening  He does have some tenderness inferior to umbilicus, possibly the urachus and notes that with palpation in this area it causes pain on the top of his penis  He does exhibit tension in his abdomen  Both knees to chest created pinching at the tip of the penis, less with hips wider, but still more discomfort noted  He is able to perform happy baby pose without pain  He has significant relief of symptoms with left knee to chest position  Pain returns once he returns leg to table  Right knee to chest produced pain down into the left posterior thigh which was increased  Pain noted post treatment of 3-4/10  Possible exacerbation from being on firm treatment table as well  NV - work on further pelvic floor down training  Asked patient to be aware of tension holding in abdominals, pelvic floor muscles, hips, glutes etc and to try to use diaphragmatic breathing to let go of some of this tension  Also recommended taking a break from sitting and stand up and walk around once an hour during the day  Plan: Continue per plan of care              Precautions: anxiety  [employer] HEP:    Manuals 2/3 2/14                        STM mobilization             Abdominal Tri Planar Myofascial release  15 min + skin rolling           Pelvic Floor Muscle Releases  assessment   10 min                                     Patient Education             Neuro Re-Ed             Real Time Ultrasound             TA ADIM             PFMC slow holds             Diaphragmatic Breathing  10 min           DKC with Diaphragmatic Breathing  30 sec x 3           Child's Pose with DiaphragmaticBreathing             Body Scan for PFM release/relaxatoin             Pelvic Floor Drops in deep squat                                                                              Ther Ex             Hamstring stretch             Piriformis stretch  review           Hip adductor/groin stretch  review           SKC stretch  20 sec x 2                                                                                                                   Ther Activity             EDUCATION/COUNSELING             Dilator Training             Gait Training                                       Modalities

## 2022-02-21 ENCOUNTER — OFFICE VISIT (OUTPATIENT)
Dept: PHYSICAL THERAPY | Facility: REHABILITATION | Age: 37
End: 2022-02-21
Payer: COMMERCIAL

## 2022-02-21 DIAGNOSIS — R10.2 CHRONIC PELVIC PAIN IN MALE: Primary | ICD-10-CM

## 2022-02-21 DIAGNOSIS — G89.29 CHRONIC PELVIC PAIN IN MALE: Primary | ICD-10-CM

## 2022-02-21 DIAGNOSIS — M62.89 PELVIC FLOOR DYSFUNCTION: ICD-10-CM

## 2022-02-21 PROCEDURE — 97110 THERAPEUTIC EXERCISES: CPT | Performed by: PHYSICAL THERAPIST

## 2022-02-21 PROCEDURE — 97530 THERAPEUTIC ACTIVITIES: CPT | Performed by: PHYSICAL THERAPIST

## 2022-02-21 PROCEDURE — 97140 MANUAL THERAPY 1/> REGIONS: CPT | Performed by: PHYSICAL THERAPIST

## 2022-02-21 PROCEDURE — 97112 NEUROMUSCULAR REEDUCATION: CPT | Performed by: PHYSICAL THERAPIST

## 2022-02-21 NOTE — PROGRESS NOTES
Daily Note     Today's date: 2022  Patient name: Isaias Dubose  : 1985  MRN: [id number]  Referring provider: Cassy Patient  Dx:   Encounter Diagnosis     ICD-10-CM    1  Chronic pelvic pain in male  R10 2     G89 29    2  Pelvic floor dysfunction  M62 89        Start Time: 1800  Stop Time: 1900  Total time in clinic (min): 60 minutes    Subjective: The patient reports that he has been paying more attention to pelvic floor muscle tension and he was able to release/let go of tension with some diaphragmatic breathing  He does not that he continues to have left groin pain as well as tightness in the perineum and at times feels like he is sitting on a ball  He also reports feeling more soreness in his abdominals over the past week and has difficulty letting go of tension in his abdominals  He also has intermittent pain which radiates from around the umbilicus through the urethra and then continues as an ache  The patient's wife, Devyn Richey, joins him for his session today  Objective: See treatment diary below      Assessment: Tolerated treatment fair  Patient has tenderness in left lower inguinal region as well as suprapbic region and inferior umbilical region  He tolerated gentle myofascial release well with some mild soreness reported  He did well with exercises today, no worsening of symptoms  Did modify a few exercises to improve comfort  Recommended performing some stretches in the middle of the day, possibly at lunch as well as later in the day  He typically takes a walk at lunch and then stretches at night  Also recommended a warm bath or warm Sitz bath to help with soreness  He has tried using heat at times  He is frustrated with the superficial skin sensitivity around his penis  He is unable to wear jeans comfortably due to friction  He notes that occasionally water will be painful like when he is in the shower   Biofeedback performed today and patient demonstrated resting rate around 3 0 mV and was able to reduce this to 2 0 mV actively with breathing and releasing/letting go  He was able to engage his pelvic floor muscles with recruitment of 8 0 mV and fully release without holding onto any tension, slight improvement in resting tone noted afterwards  Plan: Continue per plan of care              Precautions: anxiety  Medbridge HEP:    Manuals 2/3 2/14 2/21                       STM mobilization             Abdominal Tri Planar Myofascial release  15 min + skin rolling 15 min          Pelvic Floor Muscle Releases  assessment   10 min                                     Patient Education             Neuro Re-Ed   10 min          Real Time Ultrasound             TA ADIM             PFMC slow holds             Diaphragmatic Breathing  10 min 5 min          DKC with Diaphragmatic Breathing  30 sec x 3           Child's Pose with DiaphragmaticBreathing   30 sec x 3          Body Scan for PFM release/relaxatoin             Pelvic Floor Drops in deep squat                                                                              Ther Ex             Hamstring stretch             Piriformis stretch  review           Hip adductor/groin stretch  review           SKC stretch  20 sec x 2           Prone on elbows   5"x5 ea          Cat/cow   5"x5 ea                                                                                        Ther Activity             EDUCATION/COUNSELING   30 min          Dilator Training             Gait Training                                       Modalities

## 2022-02-24 ENCOUNTER — APPOINTMENT (OUTPATIENT)
Dept: LAB | Facility: CLINIC | Age: 37
End: 2022-02-24
Payer: COMMERCIAL

## 2022-02-24 ENCOUNTER — OFFICE VISIT (OUTPATIENT)
Dept: INTERNAL MEDICINE CLINIC | Facility: CLINIC | Age: 37
End: 2022-02-24
Payer: COMMERCIAL

## 2022-02-24 VITALS
OXYGEN SATURATION: 100 % | SYSTOLIC BLOOD PRESSURE: 110 MMHG | TEMPERATURE: 98.1 F | WEIGHT: 190 LBS | HEART RATE: 80 BPM | DIASTOLIC BLOOD PRESSURE: 70 MMHG | BODY MASS INDEX: 24.38 KG/M2 | HEIGHT: 74 IN

## 2022-02-24 DIAGNOSIS — G62.9 NEUROPATHY: ICD-10-CM

## 2022-02-24 DIAGNOSIS — R20.2 PARESTHESIA: ICD-10-CM

## 2022-02-24 DIAGNOSIS — R30.0 DYSURIA: ICD-10-CM

## 2022-02-24 DIAGNOSIS — R20.2 PARESTHESIA: Primary | ICD-10-CM

## 2022-02-24 PROBLEM — F33.9 DEPRESSION, RECURRENT (HCC): Status: ACTIVE | Noted: 2022-02-24

## 2022-02-24 LAB
ANION GAP SERPL CALCULATED.3IONS-SCNC: 5 MMOL/L (ref 4–13)
BASOPHILS # BLD AUTO: 0.06 THOUSANDS/ΜL (ref 0–0.1)
BASOPHILS NFR BLD AUTO: 1 % (ref 0–1)
BILIRUB UR QL STRIP: NEGATIVE
BUN SERPL-MCNC: 19 MG/DL (ref 5–25)
CALCIUM SERPL-MCNC: 9.7 MG/DL (ref 8.3–10.1)
CHLORIDE SERPL-SCNC: 102 MMOL/L (ref 100–108)
CLARITY UR: CLEAR
CO2 SERPL-SCNC: 30 MMOL/L (ref 21–32)
COLOR UR: YELLOW
CREAT SERPL-MCNC: 1.03 MG/DL (ref 0.6–1.3)
CRP SERPL QL: <3 MG/L
EOSINOPHIL # BLD AUTO: 0.38 THOUSAND/ΜL (ref 0–0.61)
EOSINOPHIL NFR BLD AUTO: 4 % (ref 0–6)
ERYTHROCYTE [DISTWIDTH] IN BLOOD BY AUTOMATED COUNT: 12.6 % (ref 11.6–15.1)
ERYTHROCYTE [SEDIMENTATION RATE] IN BLOOD: 5 MM/HOUR (ref 0–14)
GFR SERPL CREATININE-BSD FRML MDRD: 93 ML/MIN/1.73SQ M
GLUCOSE P FAST SERPL-MCNC: 93 MG/DL (ref 65–99)
GLUCOSE UR STRIP-MCNC: NEGATIVE MG/DL
HCT VFR BLD AUTO: 41.4 % (ref 36.5–49.3)
HGB BLD-MCNC: 14.8 G/DL (ref 12–17)
HGB UR QL STRIP.AUTO: NEGATIVE
IMM GRANULOCYTES # BLD AUTO: 0.01 THOUSAND/UL (ref 0–0.2)
IMM GRANULOCYTES NFR BLD AUTO: 0 % (ref 0–2)
KETONES UR STRIP-MCNC: NEGATIVE MG/DL
LEUKOCYTE ESTERASE UR QL STRIP: NEGATIVE
LYMPHOCYTES # BLD AUTO: 2.23 THOUSANDS/ΜL (ref 0.6–4.47)
LYMPHOCYTES NFR BLD AUTO: 24 % (ref 14–44)
MCH RBC QN AUTO: 32.5 PG (ref 26.8–34.3)
MCHC RBC AUTO-ENTMCNC: 35.7 G/DL (ref 31.4–37.4)
MCV RBC AUTO: 91 FL (ref 82–98)
MONOCYTES # BLD AUTO: 0.79 THOUSAND/ΜL (ref 0.17–1.22)
MONOCYTES NFR BLD AUTO: 8 % (ref 4–12)
NEUTROPHILS # BLD AUTO: 5.99 THOUSANDS/ΜL (ref 1.85–7.62)
NEUTS SEG NFR BLD AUTO: 63 % (ref 43–75)
NITRITE UR QL STRIP: NEGATIVE
NRBC BLD AUTO-RTO: 0 /100 WBCS
PH UR STRIP.AUTO: 7 [PH]
PLATELET # BLD AUTO: 258 THOUSANDS/UL (ref 149–390)
PMV BLD AUTO: 9.6 FL (ref 8.9–12.7)
POTASSIUM SERPL-SCNC: 3.7 MMOL/L (ref 3.5–5.3)
PROT UR STRIP-MCNC: NEGATIVE MG/DL
RBC # BLD AUTO: 4.55 MILLION/UL (ref 3.88–5.62)
SODIUM SERPL-SCNC: 137 MMOL/L (ref 136–145)
SP GR UR STRIP.AUTO: 1.01 (ref 1–1.03)
UROBILINOGEN UR QL STRIP.AUTO: 0.2 E.U./DL
WBC # BLD AUTO: 9.46 THOUSAND/UL (ref 4.31–10.16)

## 2022-02-24 PROCEDURE — 85652 RBC SED RATE AUTOMATED: CPT

## 2022-02-24 PROCEDURE — 80048 BASIC METABOLIC PNL TOTAL CA: CPT

## 2022-02-24 PROCEDURE — 86140 C-REACTIVE PROTEIN: CPT

## 2022-02-24 PROCEDURE — 3008F BODY MASS INDEX DOCD: CPT

## 2022-02-24 PROCEDURE — 36415 COLL VENOUS BLD VENIPUNCTURE: CPT

## 2022-02-24 PROCEDURE — 87086 URINE CULTURE/COLONY COUNT: CPT

## 2022-02-24 PROCEDURE — 85025 COMPLETE CBC W/AUTO DIFF WBC: CPT

## 2022-02-24 PROCEDURE — 99214 OFFICE O/P EST MOD 30 MIN: CPT

## 2022-02-24 PROCEDURE — 81003 URINALYSIS AUTO W/O SCOPE: CPT

## 2022-02-24 RX ORDER — GABAPENTIN 300 MG/1
300 CAPSULE ORAL
Qty: 30 CAPSULE | Refills: 1 | Status: SHIPPED | OUTPATIENT
Start: 2022-02-24 | End: 2022-04-29 | Stop reason: SDUPTHER

## 2022-02-24 NOTE — PROGRESS NOTES
[facility] - [facility]    NAME: Ganga Joy  AGE: 29 y o  SEX: male  : 1985     DATE: 2022     Assessment and Plan:     Problem List Items Addressed This Visit        Nervous and Auditory    Neuropathy    Relevant Medications    gabapentin (Neurontin) 300 mg capsule    Other Relevant Orders    Ambulatory Referral to Neurology    CBC and differential (Completed)    Basic metabolic panel (Completed)    C-reactive protein (Completed)    Sedimentation rate, automated (Completed)       Other    Dysuria    Relevant Orders    UA w Reflex to Microscopic w Reflex to Culture -Lab Collect (Completed)    Urine culture (Completed)    Paresthesia - Primary     Reports paresthesia into his penis that is intermittent  As well as pain that he describes as intermittent burning  MRI of lumbar spine, referral to neurology, referral to physical therapy for lumbar spine          Relevant Medications    gabapentin (Neurontin) 300 mg capsule    Other Relevant Orders    MRI lumbar spine wo contrast    Ambulatory Referral to Neurology    CBC and differential (Completed)    Basic metabolic panel (Completed)    C-reactive protein (Completed)    Sedimentation rate, automated (Completed)              No follow-ups on file  Chief Complaint:     Chief Complaint   Patient presents with    Back Pain     referral to pt,pelvc floor issues covid 1st week carlos still has diziness        History of Present Illness:     Checo Sales presents to the office today with a few concerns  He states that he had COVID in early January and still having some dizziness with deep inspirations  He denies fever or chills  Denies nausea or vomiting  He states it does not affect his day-to-day activities  He has also had some ongoing issues with pain, paresthesia, intermittent redness and swelling of the tip of his penis  He states that he started having the symptoms back in October    He states he was seen and treated with Diflucan for possible yeast infection as he had had sexual intercourse with his wife who had a yeast infection of the time  Initially he thought it got better however symptoms did return  He has been evaluated by Urology and is currently doing pelvic floor physical therapy  He reports that the pain is becoming worse  He states wearing certain clothing worsens the symptoms  He denies urinary incontinence, penile drainage, rashes or lesions; he denies erectile or ejaculatory dysfunction  However reports that intercourse is painful  He also is complaining of lumbar pain with radiculopathy into her right lower extremity  With the radiculopathy into his right lower extremity as well as intermittent paresthesia into his penis and pain that is described as burning, will attempt to get MRI of lumbar spine  Review of Systems:     Review of Systems   Constitutional: Negative  HENT: Negative  Respiratory: Negative  Cardiovascular: Negative  Gastrointestinal: Negative for abdominal pain, diarrhea, nausea and vomiting  Genitourinary: Positive for penile pain and penile swelling (Intermittent swelling at the tip)  Negative for decreased urine volume, dysuria, flank pain, frequency, genital sores, hematuria, penile discharge, scrotal swelling, testicular pain and urgency  Musculoskeletal: Positive for back pain  Skin:        Intermittent redness to tip of penis   Neurological: Negative  Problem List:     Patient Active Problem List   Diagnosis    Celiac disease    Intrinsic eczema    Generalized anxiety disorder    Dysuria    Penile irritation        Objective:     /70   Pulse 80   Temp 98 1 °F (36 7 °C)   Ht 6' 2" (1 88 m)   Wt 86 2 kg (190 lb)   SpO2 100%   BMI 24 39 kg/m²     Physical Exam  Vitals and nursing note reviewed  Constitutional:       Appearance: He is well-developed  HENT:      Head: Normocephalic and atraumatic        Right Ear: External ear normal       Left Ear: External ear normal       Nose: Nose normal       Mouth/Throat:      Mouth: Mucous membranes are moist       Pharynx: Oropharynx is clear  Eyes:      Conjunctiva/sclera: Conjunctivae normal    Cardiovascular:      Rate and Rhythm: Normal rate and regular rhythm  Pulses: Normal pulses  Heart sounds: Normal heart sounds  No murmur heard  Pulmonary:      Effort: Pulmonary effort is normal  No respiratory distress  Breath sounds: Normal breath sounds  Abdominal:      General: Bowel sounds are normal       Palpations: Abdomen is soft  Tenderness: There is no abdominal tenderness  Musculoskeletal:         General: Normal range of motion  Cervical back: Neck supple  Skin:     General: Skin is warm and dry  Capillary Refill: Capillary refill takes less than 2 seconds  Neurological:      Mental Status: He is alert and oriented to person, place, and time  Psychiatric:         Mood and Affect: Mood normal          Behavior: Behavior normal          Thought Content: Thought content normal          Judgment: Judgment normal          I spent 20 minutes with this patient      [address] [place] [address]  [facility]

## 2022-02-25 PROBLEM — G62.9 NEUROPATHY: Status: ACTIVE | Noted: 2022-02-25

## 2022-02-25 PROBLEM — R20.2 PARESTHESIA: Status: ACTIVE | Noted: 2022-02-25

## 2022-02-25 LAB — BACTERIA UR CULT: NORMAL

## 2022-02-25 NOTE — ASSESSMENT & PLAN NOTE
Reports paresthesia into his penis that is intermittent  As well as pain that he describes as intermittent burning    MRI of lumbar spine, referral to neurology, referral to physical therapy for lumbar spine

## 2022-02-25 NOTE — ASSESSMENT & PLAN NOTE
Patient describes intermittent pain into his penis and occasional rectal pain  He describes the penile pain as a shooting and burning pain  He also has radiculopathy was right lower extremity  MRI of lumbar spine, a referral to Neurology, he is already seeing physical therapy for pelvic floor treatment, referral to physical therapy for lumbar spine  Will try Neurontin for pain

## 2022-02-28 ENCOUNTER — TELEPHONE (OUTPATIENT)
Dept: OTHER | Facility: OTHER | Age: 37
End: 2022-02-28

## 2022-02-28 DIAGNOSIS — M54.16 LUMBAR RADICULOPATHY: Primary | ICD-10-CM

## 2022-03-01 ENCOUNTER — OFFICE VISIT (OUTPATIENT)
Dept: PHYSICAL THERAPY | Facility: REHABILITATION | Age: 37
End: 2022-03-01
Payer: COMMERCIAL

## 2022-03-01 DIAGNOSIS — M62.89 PELVIC FLOOR DYSFUNCTION: ICD-10-CM

## 2022-03-01 DIAGNOSIS — R10.2 CHRONIC PELVIC PAIN IN MALE: Primary | ICD-10-CM

## 2022-03-01 DIAGNOSIS — G89.29 CHRONIC PELVIC PAIN IN MALE: Primary | ICD-10-CM

## 2022-03-01 PROCEDURE — 97140 MANUAL THERAPY 1/> REGIONS: CPT | Performed by: PHYSICAL THERAPIST

## 2022-03-01 PROCEDURE — 97110 THERAPEUTIC EXERCISES: CPT | Performed by: PHYSICAL THERAPIST

## 2022-03-01 PROCEDURE — 97530 THERAPEUTIC ACTIVITIES: CPT | Performed by: PHYSICAL THERAPIST

## 2022-03-01 NOTE — PROGRESS NOTES
Daily Note     Today's date: 3/1/2022  Patient name: Shilpa Leiva  : 1985  MRN: [id number]  Referring provider: Santiago Dillard  Dx:   Encounter Diagnosis     ICD-10-CM    1  Chronic pelvic pain in male  R10 2     G89 29    2  Pelvic floor dysfunction  M62 89        Start Time: 1800  Stop Time: 1900  Total time in clinic (min): 60 minutes    Subjective: The patient did see his family physician recently  She did refer him for an MRI and for physical therapy for his lumbar spine  He is scheduled for his MRI at the end of March  She prescribed Gabapentin for the patient (300 mg at bedtime) and has been feeling better since  He started this last Thursday  He did keep track of his symptoms last week  He was able to have sex last Monday without pain  He notes that as the week when on last week he started to have more pain  On Wednesday, he experienced pinching around the tip of his penis, redness around the head of the penis, and back pain  Thursday his symptoms got progressively worse  Friday he notes a lot of irritation around the tip and shaft of his penis  Saturday early in the day he felt good but later that night he had a lot of sensitivity at the tip of his penis  He experienced tightness and pulling around his belly button which shot pain through his urethra and he experienced some pain and pinching in his urethra with urinating  The past two days have been significantly better  He was able to wear jeans to work  He is scheduled to start PT for his back next Wednesday and also will be seeing Neurology on Friday  Objective: See treatment diary below      Assessment: Tolerated treatment well  Patient had overall reduction in sensitivtiy of symptoms today  Abdominal tenderness improved  Still some irritation into the groin with myofascial work in the left lower abdominal region  He had no increased tension or tenderness in the perineum with assessment today   He did note pinching on the tip of his penis with right hip abd/ER on table  He does well with exercises today  He will return in one week for next session  Plan: Continue per plan of care              Precautions: anxiety  [employer] HEP:    Manuals 2/3 2/14 2/21 3/1                      STM mobilization             Abdominal Tri Planar Myofascial release  15 min + skin rolling 15 min 15 min         Pelvic Floor Muscle Releases  assessment   10 min  8 min                                   Patient Education             Neuro Re-Ed   10 min          Real Time Ultrasound             TA ADIM             PFMC slow holds             Diaphragmatic Breathing  10 min 5 min          DKC with Diaphragmatic Breathing  30 sec x 3           Child's Pose with DiaphragmaticBreathing   30 sec x 3 30 sec x 3         Body Scan for PFM release/relaxatoin             Pelvic Floor Drops in deep squat                                                                              Ther Ex             Hamstring stretch             Piriformis stretch  review           Hip adductor/groin stretch  review           SKC stretch  20 sec x 2           Prone on elbows   5"x5 ea 5"x5 ea         Cat/cow   5"x5 ea 5"x10 ea                                                                                       Ther Activity             EDUCATION/COUNSELING   30 min 15 min         Dilator Training             Gait Training                                       Modalities

## 2022-03-04 ENCOUNTER — OFFICE VISIT (OUTPATIENT)
Dept: NEUROLOGY | Facility: CLINIC | Age: 37
End: 2022-03-04
Payer: COMMERCIAL

## 2022-03-04 VITALS
HEIGHT: 74 IN | HEART RATE: 84 BPM | WEIGHT: 194.2 LBS | DIASTOLIC BLOOD PRESSURE: 60 MMHG | TEMPERATURE: 97.4 F | BODY MASS INDEX: 24.92 KG/M2 | OXYGEN SATURATION: 98 % | SYSTOLIC BLOOD PRESSURE: 124 MMHG

## 2022-03-04 DIAGNOSIS — R20.2 PARESTHESIA: ICD-10-CM

## 2022-03-04 PROCEDURE — 3008F BODY MASS INDEX DOCD: CPT

## 2022-03-04 PROCEDURE — 99214 OFFICE O/P EST MOD 30 MIN: CPT

## 2022-03-04 NOTE — PROGRESS NOTES
Patient ID: Minerva Stark is a 29 y o  male  Assessment/Plan:    Paresthesia  Minerva Stark is a 29year old male seen by the office 2 years ago for headaches  He was referred back to the office for paresthesias/neuropathy  Per chart review he reports paresthesias into his penis intermittently  He also describes pain described as intermittent burning  He is scheduled for an MRI of the lumbar spine 3/22/22 and has been referred to physical therapy  His primary care provider started him on gabapentin 300 mg at bedtime  Per chart review: He has had some ongoing issues with pain, paresthesia, intermittent redness and swelling of the tip of his penis  He states that he started having the symptoms back in October  He states he was seen and treated with Diflucan for possible yeast infection as he had had sexual intercourse with his wife who had a yeast infection of the time  Initially he thought it got better however symptoms did return  He has been evaluated by Urology and is currently doing pelvic floor physical therapy  He reports that the pain is becoming worse  He states wearing certain clothing worsens the symptoms  He denies urinary incontinence, penile drainage, rashes or lesions; he denies erectile or ejaculatory dysfunction  However reports that intercourse is painful  He also is complaining of lumbar pain with radiculopathy into her right lower extremity  With the radiculopathy into his right lower extremity as well as intermittent paresthesia into his penis and pain that is described as burning, will attempt to get MRI of lumbar spine "    His recent sed rate and C-reactive protein were normal   He also had a normal urinalysis with urine culture  TSH, CBC, CMP were all within normal limits in the recent past  He tells me today the pain started in his bladder and traveled down his urethra into his penis  He describes the pain as tingling and burning   Since doing pelvic floor therapy he no longer has burning with urination but continues with redness and irritation of the penis and intermittent tingling/pain  With gabapentin 300 mg at bedtime his pain is down from 6-7/10  to 1-2/10  His pain is exacerbated by tight clothing, intercourse, walking extended amounts of time, or sitting for extended amounts of time  He has had chronic low back pain for > 16 years, he states on prior imaging he was told he had bulging discs pressing on his spinal cord  He was treated with gabapentin and physical therapy in the past with improvement  He never has had surgery to his back  He does have intermittent sciatica type shooting pain from his gluteus down the back of his leg  This can be right or left sided and states his right foot is chronically numb since 2014 since his first bout of sciatica  He states he can feel but has diminished sensation  Has never lost function of bowel or bladder  Denies weakness  Has no difficulty with speaking, swallowing, SOB, walking, headaches, or vision changes  Discussion with the patient today, I explained I am uncertain of the etiology of his symptoms  Some aspects of his exam are concerning for lumbar radiculopathy/neuropathy, however this would not explain his penile irritation and redness  I recommended he continue with low back and pelvic floor therapy  He has had recent blood work done however I will order additional studies including Lyme, B12, SPEP  I have encouraged him to complete his MRI lumbar spine that has already been scheduled  I will also place an order for bilateral lower extremity EMG to evaluate for radiculopathy/neuropathy  In the interim I have encouraged him to continue with gabapentin, as this has been effective in treating his pain  He will follow-up in 3 months time and in the interim should continue to follow-up with urology in his primary care provider         Diagnoses and all orders for this visit:    Paresthesia  -     Ambulatory Referral to Neurology  -     Vitamin B12; Future  -     Protein electrophoresis, serum; Future  -     Lyme Antibody Profile with reflex to WB; Future  -     EMG 2 limb lower extremity; Future           Subjective:    HPI Leanne Noriega is a 29year old male seen by the office 2 years ago for headaches  He was referred back to the office for paresthesias/neuropathy  Per chart review he reports paresthesias into his penis intermittently  He also describes pain described as intermittent burning  He is scheduled for an MRI of the lumbar spine 3/22/22 and has been referred to physical therapy  His primary care provider started him on gabapentin 300 mg at bedtime  Per chart review: He has had some ongoing issues with pain, paresthesia, intermittent redness and swelling of the tip of his penis  He states that he started having the symptoms back in October  He states he was seen and treated with Diflucan for possible yeast infection as he had had sexual intercourse with his wife who had a yeast infection of the time  Initially he thought it got better however symptoms did return  He has been evaluated by Urology and is currently doing pelvic floor physical therapy  He reports that the pain is becoming worse  He states wearing certain clothing worsens the symptoms  He denies urinary incontinence, penile drainage, rashes or lesions; he denies erectile or ejaculatory dysfunction  However reports that intercourse is painful  He also is complaining of lumbar pain with radiculopathy into her right lower extremity  With the radiculopathy into his right lower extremity as well as intermittent paresthesia into his penis and pain that is described as burning, will attempt to get MRI of lumbar spine "    His recent sed rate and C-reactive protein were normal   He also had a normal urinalysis with urine culture    TSH, CBC, CMP were all within normal limits in the recent past   He tells me today the pain started in his bladder and traveled down his urethra into his penis  He describes the pain as tingling and burning  Since doing pelvic floor therapy he no longer has burning with urination but continues with redness and irritation of the penis and intermittent tingling/pain  With gabapentin 300 mg at bedtime his pain is down from 6-7/10  to 1-2/10  His pain is exacerbated by tight clothing, intercourse, walking extended amounts of time, or sitting for extended amounts of time  He has had chronic low back pain for > 16 years, he states on prior imaging he was told he had bulging discs pressing on his spinal cord  He was treated with gabapentin and physical therapy in the past with improvement  He never has had surgery to his back  He does have intermittent sciatica type shooting pain from his gluteus down the back of his leg  This can be right or left sided and states his right foot is chronically numb since 2014 since his first bout of sciatica  He states he can feel but has diminished sensation  He denies pain, numbness, or tingling of his groin  Has never lost function of bowel or bladder  Denies weakness  Has no difficulty with speaking, swallowing, SOB, walking, headaches, or vision changes  The following portions of the patient's history were reviewed and updated as appropriate: allergies, current medications, past family history, past medical history, past social history and past surgical history  Objective:    Blood pressure 124/60, pulse 84, temperature (!) 97 4 °F (36 3 °C), temperature source Temporal, height 6' 2" (1 88 m), weight 88 1 kg (194 lb 3 2 oz), SpO2 98 %  Neurological Exam    On neurological examination patient is alert, awake, oriented and in no distress  Speech is fluent without dysarthria or aphasia  Cranial nerves 2-12 were symmetrically intact bilaterally  Motor testing reveals 5/5 strength of the bilateral upper and lower extremities  There was no pronator drift  No fasciculations present   No abnormal involuntary movements  Finger- to-nose reveals no tremor or ataxia and intact proprioceptive function, no dysmetria was noted  Sensation was intact to vibration, light touch, pin prick and temperature in bilateral upper extremities  Sensation was diminished in the right lower extremity (knee to toes) to vibration however was intact to light touch, pinprick and temperature  Deep tendon reflexes were 2+ and symmetric in the bilateral upper and lower extremities  He is able to rise easily without assistance from a seated position  Casual gait is normal including stance, stride, and arm swing  Normal tandem gait  Romberg is positive with mild sway  There is no tenderness on palpation of the cervical, thoracic, or lumbar spinous process, with no radicular pain noted  Positive straight leg test on the right with pain present in the low back  I was not able to reproduce pain/tingling with hip flexion, extension, abduction, adduction, external or internal rotation  No groin pain on exam       ROS:    Review of Systems  Review of Systems   Constitutional: Negative  Negative for appetite change and fever  HENT: Negative  Negative for hearing loss, tinnitus, trouble swallowing and voice change  Eyes: Negative  Negative for photophobia and pain  Respiratory: Negative  Negative for shortness of breath  Cardiovascular: Negative  Negative for palpitations  Gastrointestinal: Negative  Negative for nausea and vomiting  Endocrine: Negative  Negative for cold intolerance  Genitourinary: Negative  Negative for dysuria, frequency and urgency  Musculoskeletal: Positive for back pain and gait problem  Negative for myalgias and neck pain  Skin: Negative  Negative for rash  Neurological: Positive for numbness  Negative for dizziness, tremors, seizures, syncope, facial asymmetry, speech difficulty, weakness, light-headedness and headaches          Patient stated that he has numbness in lower back and both legs  Hematological: Negative  Does not bruise/bleed easily  Psychiatric/Behavioral: Positive for sleep disturbance  Negative for confusion and hallucinations  Reviewed ROS as entered by medical assistant

## 2022-03-04 NOTE — ASSESSMENT & PLAN NOTE
Bjorn Alan is a 29year old male seen by the office 2 years ago for headaches  He was referred back to the office for paresthesias/neuropathy  Per chart review he reports paresthesias into his penis intermittently  He also describes pain described as intermittent burning  He is scheduled for an MRI of the lumbar spine 3/22/22 and has been referred to physical therapy  His primary care provider started him on gabapentin 300 mg at bedtime  Per chart review: He has had some ongoing issues with pain, paresthesia, intermittent redness and swelling of the tip of his penis  He states that he started having the symptoms back in October  He states he was seen and treated with Diflucan for possible yeast infection as he had had sexual intercourse with his wife who had a yeast infection of the time  Initially he thought it got better however symptoms did return  He has been evaluated by Urology and is currently doing pelvic floor physical therapy  He reports that the pain is becoming worse  He states wearing certain clothing worsens the symptoms  He denies urinary incontinence, penile drainage, rashes or lesions; he denies erectile or ejaculatory dysfunction  However reports that intercourse is painful  He also is complaining of lumbar pain with radiculopathy into her right lower extremity  With the radiculopathy into his right lower extremity as well as intermittent paresthesia into his penis and pain that is described as burning, will attempt to get MRI of lumbar spine "    His recent sed rate and C-reactive protein were normal   He also had a normal urinalysis with urine culture  TSH, CBC, CMP were all within normal limits in the recent past  He tells me today the pain started in his bladder and traveled down his urethra into his penis  He describes the pain as tingling and burning   Since doing pelvic floor therapy he no longer has burning with urination but continues with redness and irritation of the penis and intermittent tingling/pain  With gabapentin 300 mg at bedtime his pain is down from 6-7/10  to 1-2/10  His pain is exacerbated by tight clothing, intercourse, walking extended amounts of time, or sitting for extended amounts of time  He has had chronic low back pain for > 16 years, he states on prior imaging he was told he had bulging discs pressing on his spinal cord  He was treated with gabapentin and physical therapy in the past with improvement  He never has had surgery to his back  He does have intermittent sciatica type shooting pain from his gluteus down the back of his leg  This can be right or left sided and states his right foot is chronically numb since 2014 since his first bout of sciatica  He states he can feel but has diminished sensation  He denies pain, numbness or tingling of the groin  Has never lost function of bowel or bladder  Denies weakness  Has no difficulty with speaking, swallowing, SOB, walking, headaches, or vision changes  Discussion with the patient today, I explained I am uncertain of the etiology of his symptoms  Some aspects of his exam are concerning for lumbar radiculopathy/neuropathy, however this would not explain his penile irritation and redness  I recommended he continue with low back and pelvic floor therapy  He has had recent blood work done however I will order additional studies including Lyme, B12, SPEP  I have encouraged him to complete his MRI lumbar spine that has already been scheduled  I will also place an order for bilateral lower extremity EMG to evaluate for radiculopathy/neuropathy  In the interim I have encouraged him to continue with gabapentin, as this has been effective in treating his pain  He will follow-up in 3 months time and in the interim should continue to follow-up with urology in his primary care provider  Will consider ordering a CT abdomen and pelvis to evaluate for compressive etiology in the future

## 2022-03-04 NOTE — PATIENT INSTRUCTIONS
- Continue with low back and pelvic floor therapies  - Blood work  - EMG bilateral lower extremities  - Complete MRI Lumbar spine already scheduled  - Continue with Gabapentin  - Follow up in 3 months

## 2022-03-04 NOTE — PROGRESS NOTES
Patient ID: Celeste Webb is a 29 y o  male  Assessment/Plan:    No problem-specific Assessment & Plan notes found for this encounter  {Assess/PlanSmartLinks:08725}       Subjective:    HPI    {[facility] HPI texts:30798}    {Common ambulatory SmartLinks:58143}         Objective:    Blood pressure 124/60, pulse 84, temperature (!) 97 4 °F (36 3 °C), temperature source Temporal, height 6' 2" (1 88 m), weight 88 1 kg (194 lb 3 2 oz), SpO2 98 %  Physical Exam    Neurological Exam      ROS:    Review of Systems   Constitutional: Negative  Negative for appetite change and fever  HENT: Negative  Negative for hearing loss, tinnitus, trouble swallowing and voice change  Eyes: Negative  Negative for photophobia and pain  Respiratory: Negative  Negative for shortness of breath  Cardiovascular: Negative  Negative for palpitations  Gastrointestinal: Negative  Negative for nausea and vomiting  Endocrine: Negative  Negative for cold intolerance  Genitourinary: Negative  Negative for dysuria, frequency and urgency  Musculoskeletal: Positive for back pain and gait problem  Negative for myalgias and neck pain  Skin: Negative  Negative for rash  Neurological: Positive for numbness  Negative for dizziness, tremors, seizures, syncope, facial asymmetry, speech difficulty, weakness, light-headedness and headaches  Patient stated that he has numbness in lower back and both legs  Hematological: Negative  Does not bruise/bleed easily  Psychiatric/Behavioral: Positive for sleep disturbance  Negative for confusion and hallucinations

## 2022-03-08 ENCOUNTER — OFFICE VISIT (OUTPATIENT)
Dept: PHYSICAL THERAPY | Facility: REHABILITATION | Age: 37
End: 2022-03-08
Payer: COMMERCIAL

## 2022-03-08 DIAGNOSIS — G89.29 CHRONIC PELVIC PAIN IN MALE: Primary | ICD-10-CM

## 2022-03-08 DIAGNOSIS — M62.89 PELVIC FLOOR DYSFUNCTION: ICD-10-CM

## 2022-03-08 DIAGNOSIS — R10.2 CHRONIC PELVIC PAIN IN MALE: Primary | ICD-10-CM

## 2022-03-08 PROCEDURE — 97112 NEUROMUSCULAR REEDUCATION: CPT | Performed by: PHYSICAL THERAPIST

## 2022-03-08 PROCEDURE — 97140 MANUAL THERAPY 1/> REGIONS: CPT | Performed by: PHYSICAL THERAPIST

## 2022-03-08 PROCEDURE — 97110 THERAPEUTIC EXERCISES: CPT | Performed by: PHYSICAL THERAPIST

## 2022-03-08 NOTE — PROGRESS NOTES
Daily Note     Today's date: 3/8/2022  Patient name: Brendan Diaz  : 1985  MRN: [id number]  Referring provider: Ruben Sims  Dx:   Encounter Diagnosis     ICD-10-CM    1  Chronic pelvic pain in male  R10 2     G89 29    2  Pelvic floor dysfunction  M62 89        Start Time:   Stop Time:   Total time in clinic (min): 50 minutes    Subjective: The patient notes that the Gabapentin is still helping with his symptoms  He does continue to experience lower abdominal pain and tenderness  When he touches these areas it creates pain down the top of the penis  He does have intermittent irritation around the head of the penis as well  He did have an episode of lower abdominal tightness and he felt his penis curve for a second while he was emptying his bladder but it resolved quickly  He continues to be compliant with HEP and walks every day at lunch time for exercise  Objective: See treatment diary below      Assessment: Tolerated treatment well  Patient had negative neural tension tests for femoral, iliohypogastric, iioinguinal and GF nerves today in sidelying on the right or left  He did demonstrate tightness in his anterior hip and inguinal regions on both sides  He had some some discomfort/sensitivity in the tip of his penis with exercises putting pressure on the area  He did not have any additional pain or discomfort post treatment today  He does have an assessment for his lumbar spine at another clinic tomorrow  Plan: Continue per plan of care              Precautions: anxiety  [employer] HEP:    Manuals 2/3 2/14 2/21 3/1 3/8                      STM mobilization     Skin rolling lumbar spine    10 min        Abdominal Tri Planar Myofascial release  15 min + skin rolling 15 min 15 min         Pelvic Floor Muscle Releases  assessment   10 min  8 min                                   Patient Education             Neuro Re-Ed   10 min          Real Time Ultrasound             LISSETTE GUPTA PFMC slow holds             Diaphragmatic Breathing  10 min 5 min          DKC with Diaphragmatic Breathing  30 sec x 3           Child's Pose with DiaphragmaticBreathing   30 sec x 3 30 sec x 3 30 sec x 3        Body Scan for PFM release/relaxatoin             Pelvic Floor Drops in deep squat                                                                              Ther Ex             Hamstring stretch             Piriformis stretch  review           Hip adductor/groin stretch  review           SKC stretch  20 sec x 2   30 sec x 2        Prone on elbows   5"x5 ea 5"x5 ea 5"x5        Cat/cow   5"x5 ea 5"x10 ea 5"x10 ea                                                                                      Ther Activity             EDUCATION/COUNSELING   30 min 15 min         Dilator Training             Gait Training                                       Modalities

## 2022-03-09 ENCOUNTER — EVALUATION (OUTPATIENT)
Dept: PHYSICAL THERAPY | Facility: CLINIC | Age: 37
End: 2022-03-09
Payer: COMMERCIAL

## 2022-03-09 DIAGNOSIS — M54.16 LUMBAR RADICULOPATHY: ICD-10-CM

## 2022-03-09 PROCEDURE — 97110 THERAPEUTIC EXERCISES: CPT | Performed by: PHYSICAL THERAPIST

## 2022-03-09 PROCEDURE — 97161 PT EVAL LOW COMPLEX 20 MIN: CPT | Performed by: PHYSICAL THERAPIST

## 2022-03-09 NOTE — PROGRESS NOTES
PT Evaluation     Today's date: 3/9/2022  Patient name: Navid Mccann  : 1985  MRN: [id number]  Referring provider: Sharda Henderson MD  Dx:   Encounter Diagnosis     ICD-10-CM    1  Lumbar radiculopathy  M54 16 Ambulatory Referral to Physical Therapy                  Assessment  Assessment details: Navid Mccann is a pleasant 29 y o  male who presents with chief complaints of low back and pelvic pain  A mechanical assessment of the lumbar spine was performed today which demonstrated signs and symptoms consistent with lumbar posterior derangement syndrome  Posture corrections and repeated extension exercise did help to decrease his pain  He was provided a HEP consisting of prone press ups to be performed every two hours in efforts to reduce the derangement  He was advised that if he experiences an increase in his pain or symptoms he is to cease exercise until he can discuss with PT  Education was provided regarding lumbar spine mechanics and the importance of maintaining appropriate lumbar lordosis throughout the day  Patient was provided a home exercise program and demonstrated an understanding of exercises  Patient was advised to stop performing home exercise program if symptoms increase or new complaints developed  Verbal understanding demonstrated regarding home exercise program instructions  Impairments: abnormal or restricted ROM, abnormal movement, activity intolerance, lacks appropriate home exercise program and pain with function    Goals  STG - 4 weeks  Patient will be independent with home exercise program    Patient will be able to report subjective pain improvement by 2 points  LTG - 12 weeks  Patient will be able to manage symptoms independently  Patient will be able to sit without limitation due to pain  Patient will be able to squat to  objects from the floor without limitation due to pain  Patient will be able to lift without limitation due to pain    Patient will be able to wear a pair of jeans without functional limitation    Plan  Plan details: 1-2x per week over the next 12 weeks   Patient would benefit from: skilled physical therapy  Planned therapy interventions: abdominal trunk stabilization, activity modification, manual therapy, motor coordination training, neuromuscular re-education, patient education, self care, therapeutic activities, therapeutic exercise, home exercise program and behavior modification  Plan of Care beginning date: 3/9/2022  Plan of Care expiration date: 2022  Treatment plan discussed with: patient        Subjective Evaluation    History of Present Illness  Mechanism of injury: Patient reports that he has been having low back pain for about 16 years or so  About 13 years ago he did have a MRI of the lumbar spine which demonstrate degenerative changes and possible irritation of the S1 nerve root  He reports that about 6 months ago, he began to experience pain and extreme sensitivity on his penis  He has been seen by urology, who referred him to for pelvic floor therapy, neurology, and his PCP  He was recently prescribed gabapentin by his PCP which has been helping with his penis pain  He also has complaints of numbness on the tip of his penis  As far as the lumbar spine is concerned, he notes that he has pain that is intermittent in nature and changes sides  He notes that if he sits for too long, he does experience increased pain and pain going into the RLE  He denies saddle anesthesia, denies urinary retention, denies progressive lower extremity weakness, denies loss of bowel or bladder control    Is scheduled to have a lumbar MRI in a couple of weeks     Pain  Current pain ratin  At worst pain ratin    Patient Goals  Patient goals for therapy: decreased pain and return to sport/leisure activities          Objective     Postural Observations  Seated posture: fair  Standing posture: fair    Additional Postural Observation Details  Slouched sitting posture does increase pain into the penis, correction of posture does help to decrease pain       Sitting posture with towel roll for lumbar support and sitting on pillow to elevate hips above knees - with sustaining this position, slight decrease in subjective pain, decreasing pain from a 4/10 to a 2/10    Active Range of Motion     Lumbar   Flexion:  WFL  Extension:  Restriction level: moderate  Left lateral flexion:  WFL  Right lateral flexion:  Restriction level: minimal    Strength/Myotome Testing     Lumbar   Left   Normal strength    Right   Normal strength    Tests     Lumbar     Left   Negative slump test      Right   Negative slump test      General Comments:      Lumbar Comments  Mechanical assessment of the lumbar spine  RFIS - no effect  EIS - no effect  RRSGIS - no effect  REIL - decreased, better  REIL hips off center - decreased, no better               Precautions: anxiety     Access Code: [email or web address]  URL: [email or web address]  Date: 03/09/2022  Prepared by: Wanna Bosworth    Date 3/9            Visit # 1            FOTO yes             Re-eval                     Manuals 3/9                                                                Neuro Re-Ed                                                                                                        Ther Ex             Posture education GM            Prone press ups 6x10            Prone press up with therapist OP                                                                              Ther Activity                                       Gait Training                                       Modalities

## 2022-03-14 ENCOUNTER — OFFICE VISIT (OUTPATIENT)
Dept: PHYSICAL THERAPY | Facility: CLINIC | Age: 37
End: 2022-03-14
Payer: COMMERCIAL

## 2022-03-14 DIAGNOSIS — M54.16 LUMBAR RADICULOPATHY: Primary | ICD-10-CM

## 2022-03-14 PROCEDURE — 97110 THERAPEUTIC EXERCISES: CPT | Performed by: PHYSICAL THERAPIST

## 2022-03-14 NOTE — PROGRESS NOTES
Daily Note     Today's date: 3/14/2022  Patient name: Tuyet Herrera  : 1985  MRN: [id number]  Referring provider: Marvin Garcia MD  Dx:   Encounter Diagnosis     ICD-10-CM    1  Lumbar radiculopathy  M54 16                   Subjective: Patient reports compliance with his HEP from [facility]  Notes no particular pattern as it relates to his function at this point, does continue to have sensitivity in the penis  Reports that he did get a donut seat to sit on which he feels might be helping his pelvic pain some       Objective: See treatment diary below      Assessment: Repeated flexion in lying today increased sensitivity to his penis along with increasing discomfort in the lower back  He did have a mild improvement with incremental sustained extension today  No significant difference with repeated extension in lying  Advised patient to add in sustained extension at home and will touch base with the patient on Wednesday  Discussed that if he has no improvements, consider putting lumbar PT on hold until MRI results are seen, patient may benefit from pain management consultation for possible injections based off of MRI  Plan: Continue per plan of care  Precautions: anxiety     Access Code: [email or web address]  URL: [email or web address]  Date: 2022  Prepared by: Karen Gonzales    Date 3/9            Visit # 1            FOTO yes             Re-eval                     Manuals 3/9 3/14                                                               Neuro Re-Ed                                                                                                        Ther Ex             Posture education GM            Prone press ups 6x10 Self 30x            Prone press up with therapist OP  10x           DKC  Pain 10x           Sustained extension  wedge 6'           Incremental sustained extension  Hi low 10 mins            Treadmill  5 mins self pace                         Ther Activity Gait Training                                       Modalities

## 2022-03-15 ENCOUNTER — OFFICE VISIT (OUTPATIENT)
Dept: PHYSICAL THERAPY | Facility: REHABILITATION | Age: 37
End: 2022-03-15
Payer: COMMERCIAL

## 2022-03-15 DIAGNOSIS — M62.89 PELVIC FLOOR DYSFUNCTION: ICD-10-CM

## 2022-03-15 DIAGNOSIS — R10.2 CHRONIC PELVIC PAIN IN MALE: Primary | ICD-10-CM

## 2022-03-15 DIAGNOSIS — G89.29 CHRONIC PELVIC PAIN IN MALE: Primary | ICD-10-CM

## 2022-03-15 PROCEDURE — 97110 THERAPEUTIC EXERCISES: CPT | Performed by: PHYSICAL THERAPIST

## 2022-03-15 PROCEDURE — 97530 THERAPEUTIC ACTIVITIES: CPT | Performed by: PHYSICAL THERAPIST

## 2022-03-15 PROCEDURE — 97140 MANUAL THERAPY 1/> REGIONS: CPT | Performed by: PHYSICAL THERAPIST

## 2022-03-15 NOTE — PROGRESS NOTES
Daily Note     Today's date: 3/15/2022  Patient name: Umberto Collier  : 1985  MRN: [id number]  Referring provider: Blake Guerra  Dx:   Encounter Diagnosis     ICD-10-CM    1  Chronic pelvic pain in male  R10 2     G89 29    2  Pelvic floor dysfunction  M62 89        Start Time: 1800  Stop Time: 1900  Total time in clinic (min): 60 minutes    Subjective: The patient notes that he did some of his PT exercises, which are extension based, but notes that his symptoms felt worse last night and a little worse than normal this morning  He continues to experience hypersensitivity to light touch around the head of his penis  He also experiences pain with sitting, including some pain which radiates to his anus  Also, intermittent pulling around the base of his penis which radiates up towards his umbilicus and down through the urethra and causes pinching in the urethra  He does have a donut pillow which he has been sitting on which helps with pelvic floor symptoms but then starts to bother his lower back  Objective: See treatment diary below      Assessment: Tolerated treatment fair  Patient had no abdominal tenderness with manual therapy interventions today  He reported increased tightness at the base of his penis which was pulling into the right groin and wrapping around his trunk towards his spine  Prone on elbows stretch helped to reduce some tightness to aching over the left lower suprapubic region  Some hypomobility noted around suprapubic/hypogastric region with inf/sup direction with no change in symptoms  He is having an MRI next week  He also notes some relief with stretches and exercises previously performed  Recommended continuing with these at home as long as they do not increase symptoms  Plan: Continue per plan of care        Precautions: anxiety  [employer] HEP:    Manuals 2/3 2/14 2/21 3/1 3/8  3/15                    STM mobilization     Skin rolling lumbar spine    10 min Skin rolling lumbar spin  8 min       Abdominal Tri Planar Myofascial release  15 min + skin rolling 15 min 15 min  10 min       Pelvic Floor Muscle Releases  assessment   10 min  8 min                                   Patient Education             Neuro Re-Ed   10 min          Real Time Ultrasound             TA ADIM             PFMC slow holds             Diaphragmatic Breathing  10 min 5 min          DKC with Diaphragmatic Breathing  30 sec x 3           Child's Pose with DiaphragmaticBreathing   30 sec x 3 30 sec x 3 30 sec x 3 30 sec x 3       Body Scan for PFM release/relaxatoin             Pelvic Floor Drops in deep squat                                                                              Ther Ex             Hamstring stretch             Piriformis stretch  review           Hip adductor/groin stretch  review           SKC stretch  20 sec x 2   30 sec x 2        Prone on elbows   5"x5 ea 5"x5 ea 5"x5 2 minutes       Cat/cow   5"x5 ea 5"x10 ea 5"x10 ea                                                                                      Ther Activity             EDUCATION/COUNSELING   30 min 15 min  15 min       Dilator Training             Gait Training                                       Modalities

## 2022-03-21 ENCOUNTER — TELEPHONE (OUTPATIENT)
Dept: UROLOGY | Facility: AMBULATORY SURGERY CENTER | Age: 37
End: 2022-03-21

## 2022-03-21 ENCOUNTER — OFFICE VISIT (OUTPATIENT)
Dept: PHYSICAL THERAPY | Facility: REHABILITATION | Age: 37
End: 2022-03-21
Payer: COMMERCIAL

## 2022-03-21 DIAGNOSIS — G89.29 CHRONIC PELVIC PAIN IN MALE: Primary | ICD-10-CM

## 2022-03-21 DIAGNOSIS — M62.89 PELVIC FLOOR DYSFUNCTION: ICD-10-CM

## 2022-03-21 DIAGNOSIS — M54.16 LUMBAR RADICULOPATHY: ICD-10-CM

## 2022-03-21 DIAGNOSIS — R10.2 CHRONIC PELVIC PAIN IN MALE: Primary | ICD-10-CM

## 2022-03-21 PROCEDURE — 97112 NEUROMUSCULAR REEDUCATION: CPT | Performed by: PHYSICAL THERAPIST

## 2022-03-21 PROCEDURE — 97140 MANUAL THERAPY 1/> REGIONS: CPT | Performed by: PHYSICAL THERAPIST

## 2022-03-21 NOTE — TELEPHONE ENCOUNTER
Attempted to reach patient at [phone]  A voice message was left asking pt to return office call

## 2022-03-21 NOTE — PROGRESS NOTES
Daily Note     Today's date: 3/21/2022  Patient name: Sharonda Ball  : 1985  MRN: [id number]  Referring provider: Pranav Fung  Dx:   Encounter Diagnosis     ICD-10-CM    1  Chronic pelvic pain in male  R10 2     G89 29    2  Pelvic floor dysfunction  M62 89    3  Lumbar radiculopathy  M54 16        Start Time: 1800  Stop Time: 1900  Total time in clinic (min): 60 minutes    Subjective: The patient notes that he did not have a good week last week  He got progressively better as the week went on  He did have increased sensitivity at head of the penis  Objective: See treatment diary below      Assessment: Tolerated treatment fair  Patient tolerated exercises well today including diaphragmatic breathing and mindfullness exericses  Some tension noted at the perineum at midline today and patient reported feeling some tightness in this area  No tenderness or referred pain  Re-assessed pelvic floor muscles today in supine/hooklying  He does report some tenderness in the right OI and puborectalis  He also demonstrate tension on the right as well  He had no referred pain or worsening of symptoms  No tension or pain on the left with assessment  Overall, no change in symptoms post treatment today  Plan: Continue per plan of care        Precautions: anxiety  [employer] HEP:    Manuals 2/3 2/14 2/21 3/1 3/8  3/15 3/21                   STM mobilization     Skin rolling lumbar spine    10 min Skin rolling lumbar spin  8 min       Abdominal Tri Planar Myofascial release  15 min + skin rolling 15 min 15 min  10 min       Pelvic Floor Muscle Releases  assessment   10 min  8 min   10 min + assessment      Perineal STM/adductors       15 min                    Patient Education             Neuro Re-Ed   10 min          Real Time Ultrasound             TA ADIM             [facility] slow holds             Diaphragmatic Breathing  10 min 5 min    10 min + guided mindufulness      DKC with Diaphragmatic Breathing  30 sec x 3           Child's Pose with DiaphragmaticBreathing   30 sec x 3 30 sec x 3 30 sec x 3 30 sec x 3 NP - pain      Happy Baby Pose       30 sec x 3      Body Scan for PFM release/relaxatoin             Pelvic Floor Drops in deep squat                                                                              Ther Ex             Hamstring stretch             Piriformis stretch  review           Hip adductor/groin stretch  review           SKC stretch  20 sec x 2   30 sec x 2        Prone on elbows   5"x5 ea 5"x5 ea 5"x5 2 minutes       Cat/cow   5"x5 ea 5"x10 ea 5"x10 ea                                                                                      Ther Activity             EDUCATION/COUNSELING   30 min 15 min  15 min 10 min      Dilator Training             Gait Training                                       Modalities

## 2022-03-21 NOTE — TELEPHONE ENCOUNTER
Pt under care of- Jeronimo Valencia    Pt is currently having pelvic floor therapy and was recommended to see Dr Christianne Garcia  Pt stated testicular pain  Pain level 3-4  Pt stated swelling of testicles   Pt is having pain while urinating and no blood visible     Pt call [facility]-[phone]

## 2022-03-21 NOTE — TELEPHONE ENCOUNTER
Pt stating he has been getting numbness and hypersensitivity in his penis  and was recommended by PT to see aniya        Time date location confirmed

## 2022-03-22 ENCOUNTER — HOSPITAL ENCOUNTER (OUTPATIENT)
Dept: MRI IMAGING | Facility: CLINIC | Age: 37
Discharge: HOME | End: 2022-03-22
Payer: COMMERCIAL

## 2022-03-22 DIAGNOSIS — R20.2 PARESTHESIA: ICD-10-CM

## 2022-03-22 PROCEDURE — G1004 CDSM NDSC: HCPCS

## 2022-03-22 PROCEDURE — 72148 MRI LUMBAR SPINE W/O DYE: CPT

## 2022-03-31 ENCOUNTER — TELEPHONE (OUTPATIENT)
Dept: INTERNAL MEDICINE CLINIC | Facility: CLINIC | Age: 37
End: 2022-03-31

## 2022-03-31 NOTE — TELEPHONE ENCOUNTER
Patient states that at his visit with Rhianna, she was going to refer him to Pain Management  He never received the referral and wants to know if he can get one

## 2022-04-12 ENCOUNTER — OFFICE VISIT (OUTPATIENT)
Dept: PHYSICAL THERAPY | Facility: REHABILITATION | Age: 37
End: 2022-04-12
Payer: COMMERCIAL

## 2022-04-12 DIAGNOSIS — M54.16 LUMBAR RADICULOPATHY: ICD-10-CM

## 2022-04-12 DIAGNOSIS — R10.2 CHRONIC PELVIC PAIN IN MALE: Primary | ICD-10-CM

## 2022-04-12 DIAGNOSIS — G89.29 CHRONIC PELVIC PAIN IN MALE: Primary | ICD-10-CM

## 2022-04-12 DIAGNOSIS — M62.89 PELVIC FLOOR DYSFUNCTION: ICD-10-CM

## 2022-04-12 PROCEDURE — 97140 MANUAL THERAPY 1/> REGIONS: CPT | Performed by: PHYSICAL THERAPIST

## 2022-04-12 PROCEDURE — 97164 PT RE-EVAL EST PLAN CARE: CPT | Performed by: PHYSICAL THERAPIST

## 2022-04-12 PROCEDURE — 97530 THERAPEUTIC ACTIVITIES: CPT | Performed by: PHYSICAL THERAPIST

## 2022-04-12 NOTE — PROGRESS NOTES
PT Re-Evaluation     Today's date: 2022  Patient name: Domitila Diaz  : 1985  MRN: [id number]  Referring provider: Isaias Jimenez  Dx:   Encounter Diagnosis     ICD-10-CM    1  Chronic pelvic pain in male  R10 2     G89 29    2  Pelvic floor dysfunction  M62 89    3  Lumbar radiculopathy  M54 16        Start Time: 1800  Stop Time: 1900  Total time in clinic (min): 60 minutes    Assessment  Assessment details: The patient is a 29 y o  male with complaints of penile pain and hypersensitivity at the head of the penis  He has been treated for a total of 8 visits including his IE on 2/3 and today's visit  He also complains of increased pain with elevated stress, prolonged sitting, and tension/weakness with ejaculation  He does have a history of herniated discs in his lumbar spine (R paracentral disc herniation at L4-L5)  Recent MRI also showed a central disc protrusion at T12-L1  He does report some improvement in symptoms  Reproduction of symptoms with palpation over the R suprapubic region  Ilioinguinal, Femoral, and Genitofemoral neural tension tests did not indicate any neural tension in these nerves on the right or left  Perineal palpation was not tender or did not reproduce symptoms  Improvement noted in muscle tension and tone of his abdominals  He had no tenderness around the umbilicus and reports that this has been improved with stretching routine  Rectal internal pelvic floor muscle exam revealed tension of the right and left pelvic floor muscles, right > left with some tenderness noted on the right  He would benefit from continuing pelvic floor physical therapy to help further reduce/manage pain and symptoms, address impairments and maximize function and quality of life upon discharge  He will be given updated HEP throughout episode of care       Therapeutic activities performed upon examination included education regarding pelvic floor anatomy, explanation of exam technique, explanation of exam findings and discussion of treatment plan as well as expectations of the patient to emphasize the importance of compliance and adherence to physical therapy visits  Impairments: abnormal muscle tone, abnormal or restricted ROM, activity intolerance, impaired physical strength, lacks appropriate home exercise program and pain with function  Understanding of Dx/Px/POC: good   Prognosis: good    Goals  ST  The patient will reduce pelvic and penile pain by 25 to 50% in 8 visits  - not met, slight improvement in pain frequency and severity  2  The patient will reduce pelvic floor muscle tone by 50% in 8 visits  - not met  3  The patient will improve hip ROM by 5 to 10 degrees in 8 visits  - not met    LT  The patient will normalize pelvic floor muscle tone upon discharge  2  The patient will be able to recruit his pelvic floor muscles without pain or tension holding upon discharge  3  The patient will be able to tolerate sitting with minimal to no pain upon discharge  4  The patient will return to previous activity level without pain upon discharge  5  The patient will be able to have intercourse with minimal discomfort upon discharge  Plan  Patient would benefit from: skilled PT  Planned modality interventions: biofeedback and ultrasound (Real Time Ultrasound)  Planned therapy interventions: manual therapy, neuromuscular re-education, patient education, strengthening, therapeutic exercise, therapeutic training, home exercise program, abdominal trunk stabilization, self care, postural training, therapeutic activities and stretching  Frequency: 1x week  Duration in visits: 12  Duration in weeks: 12  Plan of Care beginning date: 2/3/2022  Plan of Care expiration date: 2022  Treatment plan discussed with: patient        PT Pelvic Floor Subjective:   History of Present Illness: The patient reports that he received the results of his MRI that was about one month ago   He was referred to pain management by his PCP  He notes that after his previous treatment session, he felt relief for a few days but then he had an exacerbation of symptoms after he massaged an area in his lower abdomen on the left  He notes that this past Friday he had a lot of pain and he did not sleep much  He took a Valium the night after and it reduced his pain significantly  He has been feeling better since, not constant pain but intermittent pain on the head of the penis when something would brush against his skin  Sensitivity at rim of head of penis; redness around head of penis during sexual activity and during a shower; occasionally swelling (sometimes one sided around head of penis) - when pain is heightened  Previously pain with ejaculation; much improved no pain with ejaculation but still some tension and a weaker ejaculation  Pain improved at night and best first thing in the morning  Pain radiates into thighs frequently into groin, from the lower back    No history of hernias  No UTIs     Social Support:     Work status: employed full time (patient works from home - )  Diet and Exercise:      Exercise type: walking    Walking and stretching daily  Co-morbidities:    Sciatic nerve pain since college - has had MRI's which showed HNP's; he did go to PT twice (once in college and once 6-7 years ago) and also had injections; no recent flare ups of pain  Bladder Function:     Voiding Difficulties positive for: incomplete emptying      Voiding Difficulties negative for: urgency, frequent urination, hesitancy (occasionally) and straining      Voiding Difficulties comments:     Voiding frequency: every 1-2 hours    Urinary leakage: urine leakage    Urinary leakage aggravated by: post-void dribble    Nocturia (episodes per night): 0    Painful urination: Yes (on occasion; burning)      Fluid Intake Type:  Coffee and water    Intake (ounces):      Intake (ounces) comment: Coffee: 16 ounces daily  Water: 3, 24 ounce water bottles a day  [place] water    100 ounces total a day  Bowel Function:     Bowel Function comments:  No constipation or straining  Occasional jolt of pain during bowel movement    Bowel frequency: daily  Sexual Function:     Sexually Active:  Sexually active    Pain during intercourse: No (weak ejaculation)      pain does not cause abstinence  Pain:     Current pain ratin    At worst pain ratin    Location:  Head of penis; burning;  2/10 currently, 5/10 at worst        groin pain: 0/10 currently, at worst 3-4/10 (LEFT side)    Quality:  Burning (raw feeling)    Exacerbated by: sitting; tight fitting clothing; stress; sex; urination  Relieving factors:  Change in position (stretching/walking; relief after ejaculation for a few hours; lying on side)      Objective     Static Posture     Head  Forward  Shoulders  Rounded  Lumbar Spine   Increased lordosis       Neurological Testing     Sensation     Lumbar   Left   Intact: light touch    Right   Intact: light touch    Hip   Left Hip   Intact: light touch  Diminished: light touch    Right Hip   Intact: light touch    Comments   Left light touch: L3      Reflexes   Left   Patellar (L4): normal (2+)  Achilles (S1): normal (2+)    Right   Patellar (L4): normal (2+)  Achilles (S1): normal (2+)    Active Range of Motion     Lumbar   Flexion:  WFL  Extension:  Restriction level: minimal  Left lateral flexion:  WFL  Right lateral flexion:  WFL  Left rotation:  WFL  Right rotation:  Restriction level: minimal  Left Hip   Flexion: 110 degrees     Right Hip   Flexion: 110 degrees     Joint Play     Hypomobile: L2, L3, L4, L5 and S1     Pain: L2, L3 and L4   L2 comments: localized and radiated into right glute  L3 comments: localized and radiated into right glute  L4 comments: localized and radiated into right glute    Strength/Myotome Testing     Left Hip   Planes of Motion   Flexion: 4  Extension: 4  Abduction: 4  External rotation: 4  Internal rotation: 4    Right Hip   Planes of Motion   Flexion: 4  Extension: 4  Abduction: 4  External rotation: 4  Internal rotation: 4    Left Knee   Flexion: 4  Extension: 4    Right Knee   Flexion: 4  Extension: 4    Left Ankle/Foot   Dorsiflexion: 4  Plantar flexion: 4    Right Ankle/Foot   Dorsiflexion: 4  Plantar flexion: 4    Additional Strength Details  Able to heel walk/toe walk without difficulty     Tests     Lumbar   Negative SIJ compression, sacroiliac distraction and sacral spring   Left   Positive passive SLR  Negative crossed SLR  Right   Negative crossed SLR and passive SLR  Left Pelvic Girdle/Sacrum   Negative: active SLR test      Right Pelvic Girdle/Sacrum   Negative: active SLR test      Left Hip   Negative JOSSE, FADIR, scour and SI compression  Right Hip   Negative JOSSE, FADIR, scour and SI compression  Additional Tests Details  + L anterior hip/groin pain with passive SLR > 60 deg; Passive hip flexion > 100 degrees     General Comments:      Lumbar Comments  + pain through the penis with resistive gluteus cristal strengthening  Pelvic Floor Exam   Position: supine exam  Abdominal assessment: No Tenderness to palpation around umbilicus; no tenderness from pubic bone to umbilicus  Tenderness right suprapubic region just superior to right pubic tubercle    Neural Tension Testing: testing positions in standing  Femoral nerve: no symptoms on the right or left  Ilioinguinal nerves: no symptoms on the right or left  Obturator nerve: no symptoms on the right or left    Diastatis   no tenderness at linea alba    General Perineum Exam:   perineum intact     Negative for swelling, lesion, rectal irritation and perianal erythema    General perineum exam comments: Pelvic floor verbal consent and written consent signed and in chart    Education provided today:   Time Spent on Patient Education: 20 min    Pelvic floor anatomy and function  Physiology/relationship of abdominal canister and pelvis/pelvic organs/pelvic floor muscles  Diaphragm and Diaphragmatic breathing  Bowel and Bladder anatomy and function    PT exam and course of treatment    Pelvic Clock:   Ttp: none over perineal body; STP; DTP; Ischiocavernosus; Bulbospongiosus ; proximal hip adductors; inguinal canal    Visual Inspection of Perineum:   Excursion of perineal body in cephalad direction with contraction of pelvic floor muscles (PFM): good  Excursion of perineal body in caudal direction with relaxation of pelvic floor muscles (PFM): fair   Cotton swab test: non-tender  Cough reflex: anal wink reflex present    Sphincter Tone Resting: increased  Sphincter Tone Squeeze: normal  Sensation: intact    Pelvic Floor Muscle Exam     Palpation   No increased muscle tension in the bulbospongiosus, ischiocavernosus and super transverse perineal  Minimal increased muscle tension in the pubococcygeus, iliococcygeus, coccygeus and obturator internus  Moderate increased muscle tension in the puborectalis  Minimal tenderness on right in the puborectalis, pubococcygeus, iliococcygeus and obturator internus  No tenderness on left in the puborectalis, pubococcygeus, coccygeus and obturator internus                   Precautions: anxiety  [facility] HEP:    Manuals 2/3 2/14 2/21 3/1 3/8  3/15 3/21 4/12                  STM mobilization     Skin rolling lumbar spine    10 min Skin rolling lumbar spin  8 min       Abdominal Tri Planar Myofascial release  15 min + skin rolling 15 min 15 min  10 min       Pelvic Floor Muscle Releases  assessment   10 min  8 min   10 min + assessment 15 min + assessment     Perineal STM/adductors       15 min                    Patient Education             Neuro Re-Ed   10 min          Real Time Ultrasound             TA ADIM             [facility] slow holds             Diaphragmatic Breathing  10 min 5 min    10 min + guided mindufulness      DKC with Diaphragmatic Breathing  30 sec x 3           Child's Pose with DiaphragmaticBreathing   30 sec x 3 30 sec x 3 30 sec x 3 30 sec x 3 NP - pain      Happy Baby Pose       30 sec x 3      Body Scan for PFM release/relaxatoin             Pelvic Floor Drops in deep squat                                                                              Ther Ex             Hamstring stretch             Piriformis stretch  review           Hip adductor/groin stretch  review           SKC stretch  20 sec x 2   30 sec x 2        Prone on elbows   5"x5 ea 5"x5 ea 5"x5 2 minutes       Cat/cow   5"x5 ea 5"x10 ea 5"x10 ea                                                                                      Ther Activity             EDUCATION/COUNSELING   30 min 15 min  15 min 10 min 25 min     Dilator Training             Gait Training                                       Modalities

## 2022-04-18 ENCOUNTER — OFFICE VISIT (OUTPATIENT)
Dept: PHYSICAL THERAPY | Facility: REHABILITATION | Age: 37
End: 2022-04-18
Payer: COMMERCIAL

## 2022-04-18 DIAGNOSIS — M54.16 LUMBAR RADICULOPATHY: ICD-10-CM

## 2022-04-18 DIAGNOSIS — M62.89 PELVIC FLOOR DYSFUNCTION: ICD-10-CM

## 2022-04-18 DIAGNOSIS — G89.29 CHRONIC PELVIC PAIN IN MALE: Primary | ICD-10-CM

## 2022-04-18 DIAGNOSIS — R10.2 CHRONIC PELVIC PAIN IN MALE: Primary | ICD-10-CM

## 2022-04-18 PROCEDURE — 97112 NEUROMUSCULAR REEDUCATION: CPT | Performed by: PHYSICAL THERAPIST

## 2022-04-18 PROCEDURE — 97110 THERAPEUTIC EXERCISES: CPT | Performed by: PHYSICAL THERAPIST

## 2022-04-18 NOTE — PROGRESS NOTES
Assessment:  1  Chronic pain syndrome    2  Chronic bilateral low back pain with bilateral sciatica    3  Neuropathy    4  Lumbar disc herniation with radiculopathy        Plan:  New Medications Ordered This Visit   Medications    magnesium 30 MG tablet     Sig: Take 30 mg by mouth 2 (two) times a day     My impressions and treatment recommendations were discussed in detail with the patient, who verbalized understanding and had no further questions  Given that the patient reports low back pain and bilateral lower extremity radiculopathy in what appears to be the bilateral L5 and S1 distribution in the context of a lumbar disc herniation, I felt a reasonable to offer the patient a L4-L5 lumbar epidural steroid injection since this could be potentially therapeutic  The procedures, its risks, and benefits were explained in detail to the patient  Risks include but are not limited to bleeding, infection, hematoma formation, abscess formation, weakness, headache, failure the pain to improve, nerve irritation or damage, and potential worsening of the pain  The patient verbalized understanding and wished to proceed with the procedure  In addition, the patient is complaining of pain at the head of his penis  He states that his symptoms are so severe at times that he has difficulty wearing clothes and there are certain seams of clothes that bother him considerably  I mentioned to the patient that this is likely some sort of nerve irritation in his anterior abdominal wall  He did report considerable pain a few months ago in his anterior abdominal wall and he is currently undergoing pelvic floor physical therapy with excellent results  I encouraged him to continue that  I will see if this is in part related to his lumbar spine problems and see if this resolves after the epidural steroid injection, but more than likely it is related to some sort of nerve irritation in the abdominal wall musculature      Follow-up is planned in 4 weeks time or sooner as warranted  Discharge instructions were provided  I personally saw and examined the patient and I agree with the above discussed plan of care  History of Present Illness:    Kendall Garcia is a 29 y o  male who presents to [facility] for initial evaluation of the above stated pain complaints  The patient has a past medical and chronic pain history as outlined in the assessment section  He was referred by JESSICA Le  The patient is reporting pain primarily in his low back and bilateral lower extremities  He is also complaining of pain at the head of his penis  He describes pain as moderate to severe and 2/10 on the verbal numerical pain rating scale  His pain is nearly constant in nature and worse in the evening  He describes his pain as burning, shooting, numbness, dull/aching  He does not ambulate with any assistive devices  Lying down, exercise, relaxation, and bowel movements in decreases pain  Bending and sitting increases pain  Physical therapy, home exercises, heat/ice treatment, and biofeedback provided moderate pain relief  The patient does drink alcohol once per week  Hydrocodone, tramadol, ibuprofen have been used in the past   Diazepam and gabapentin at currently being used  The only medicines that provided relief include hydrocodone, ibuprofen, diazepam, and gabapentin  Review of Systems:    Review of Systems   Constitutional: Negative for fever and unexpected weight change  HENT: Negative for trouble swallowing  Eyes: Negative for visual disturbance  Respiratory: Negative for shortness of breath and wheezing  Cardiovascular: Negative for chest pain and palpitations  Gastrointestinal: Negative for constipation, diarrhea, nausea and vomiting  Endocrine: Positive for polyuria  Negative for cold intolerance, heat intolerance and polydipsia  Genitourinary: Positive for dysuria   Negative for difficulty urinating and frequency  Musculoskeletal: Positive for myalgias  Negative for arthralgias, gait problem and joint swelling  Skin: Negative for rash  Neurological: Positive for numbness  Negative for dizziness, seizures, syncope, weakness and headaches  Hematological: Does not bruise/bleed easily  Psychiatric/Behavioral: Negative for dysphoric mood  All other systems reviewed and are negative  Patient Active Problem List   Diagnosis    Celiac disease    Intrinsic eczema    Generalized anxiety disorder    Dysuria    Penile irritation    Depression, recurrent ([address] )    Paresthesia    Neuropathy    Lumbar radiculopathy       Past Medical History:   Diagnosis Date    Back pain     Bloody ejaculation     Celiac disease     Depression        History reviewed  No pertinent surgical history  Family History   Problem Relation Age of Onset    Diabetes Maternal Grandmother     Heart disease Paternal Grandfather     No Known Problems Mother     No Known Problems Father        Social History     Occupational History    Not on file   Tobacco Use    Smoking status: Never Smoker    Smokeless tobacco: Never Used   Vaping Use    Vaping Use: Never used   Substance and Sexual Activity    Alcohol use:  Yes    Drug use: Never    Sexual activity: Yes         Current Outpatient Medications:     busPIRone (BUSPAR) 5 mg tablet, Take 1 tablet (5 mg total) by mouth 2 (two) times a day, Disp: 180 tablet, Rfl: 3    cetirizine (ZyrTEC) 10 mg tablet, Take 10 mg by mouth daily, Disp: , Rfl:     gabapentin (Neurontin) 300 mg capsule, Take 1 capsule (300 mg total) by mouth daily at bedtime, Disp: 30 capsule, Rfl: 1    magnesium 30 MG tablet, Take 30 mg by mouth 2 (two) times a day, Disp: , Rfl:     multivitamin (THERAGRAN) TABS, Take 1 tablet by mouth daily, Disp: , Rfl:     QUERCETIN PO, Take 2 capsules by mouth in the morning 800 mg, Disp: , Rfl:     Allergies   Allergen Reactions    Bactrim [Sulfamethoxazole-Trimethoprim] Hives    Strawberry Extract - Food Allergy Other (See Comments)     throat tightness       Physical Exam:    /68   Pulse 69   Resp 18   Ht 6' 2" (1 88 m)   Wt 87 1 kg (192 lb)   BMI 24 65 kg/m²     Constitutional: normal, well developed, well nourished, alert, in no distress and non-toxic and no overt pain behavior  Eyes: anicteric  HEENT: grossly intact  Neck: supple, symmetric, trachea midline and no masses   Pulmonary:even and unlabored  Cardiovascular:No edema or pitting edema present  Skin:Normal without rashes or lesions and well hydrated  Psychiatric:Mood and affect appropriate  Neurologic:Cranial Nerves II-XII grossly intact  Musculoskeletal:normal     Lumbar Spine Exam    Appearance:  Normal lordosis  Palpation/Tenderness:  no tenderness or spasm  Sensory:  no sensory deficits noted  Range of Motion:  Flexion:   Moderately limited  with pain  Extension:  Moderately limited  with pain  Lateral Flexion - Left:  Moderately limited  with pain  Lateral Flexion - Right:  Moderately limited  with pain  Rotation - Left:  Moderately limited  with pain  Rotation - Right:  Moderately limited  with pain   Lumbar facet loading is negative bilaterally  Motor Strength:  Left hip flexion:  5/5  Left hip extension:  5/5  Right hip flexion:  5/5  Right hip extension:  5/5  Left knee flexion:  5/5  Left knee extension:  5/5  Right knee flexion:  5/5  Right knee extension:  5/5  Left foot dorsiflexion:  5/5  Left foot plantar flexion:  5/5  Right foot dorsiflexion:  5/5  Right foot plantar flexion:  5/5  Reflexes:  Left Patellar:  2+   Right Patellar:  2+   Left Achilles:  2+   Right Achilles:  2+   Special Tests:  Left Straight Leg Test:  negative  Right Straight Leg Test:  negative  Left Alfonso's Maneuver:  negative  Right Alfonso's Maneuver:  negative    Imaging  No orders to display   MRI LUMBAR SPINE WITHOUT CONTRAST  03/22/2022     INDICATION: R20 2: Paresthesia of skin      COMPARISON:  None      TECHNIQUE:  Sagittal T1, sagittal T2, sagittal inversion recovery, axial T1 and axial T2, coronal T2     IMAGE QUALITY:  Diagnostic     FINDINGS:     VERTEBRAL BODIES:  There are 5 lumbar type vertebral bodies  There is mild retrolisthesis of L5 on S1      Mixed Modic type II and I degenerative endplate signal change at L5-S1      SACRUM:  Normal signal within the sacrum  No evidence of insufficiency or stress fracture      DISTAL CORD AND CONUS:  Normal size and signal within the distal cord and conus      PARASPINAL SOFT TISSUES:  Paraspinal soft tissues are unremarkable      LOWER THORACIC DISC SPACES:  There is central disc protrusion with mild canal narrowing at level T12-L1      LUMBAR DISC SPACES:     L1-L2:  No disc bulge  No canal or foraminal stenosis      L2-L3:  No disc bulge  No canal or foraminal stenosis      L3-L4:  No disc bulge  Mild facet arthropathy  No canal or foraminal stenosis        L4-L5:  Right paracentral and foraminal disc protrusion contacting right L5 nerve root with possible mild nerve compression  Moderate bilateral facet arthropathy  Mild canal stenosis  Mild right foraminal narrowing      L5-S1:  There is disc height loss and degenerative disc dehydration  There is disc bulge contacting S1 nerve roots, left greater than right without apparent nerve root compression  Mild facet arthropathy  There is mild canal stenosis  Mild bilateral   foraminal narrowing      IMPRESSION:     1  Right paracentral and foraminal disc protrusion at level L4-5 contacting right L5 nerve root with possible mild nerve compression  No high-grade canal narrowing  Correlate for right L5 radiculopathy      2  Disc bulge at L5-S1 contacting S1 nerve roots without apparent nerve root compression  Mild canal narrowing at this level      3   Mild foraminal narrowing at right L4-5 and bilateral L5-S1      No orders of the defined types were placed in this encounter

## 2022-04-18 NOTE — PROGRESS NOTES
Daily Note     Today's date: 2022  Patient name: Shameka Multani  : 1985  MRN: [id number]  Referring provider: Fabio Flower  Dx:   Encounter Diagnosis     ICD-10-CM    1  Chronic pelvic pain in male  R10 2     G89 29    2  Pelvic floor dysfunction  M62 89    3  Lumbar radiculopathy  M54 16        Start Time: 0900  Stop Time: 1000  Total time in clinic (min): 60 minutes    Subjective: (Magnesium Gylcinate - 200 mg two times a day)  The patient started to take this over the weekend, on Friday night  He notes that he felt good pretty over the weekend with pain levels at a minimum  He continues to feel good even after traveling in the car on both Saturday and  an hour each way, which typically would be bothersome  He notes that the sensitivity at the tip of his penis is improved, but still present  He also reports reduction in pain in suprapubic region as well  Yesterday he did experience some urgency and frequency and some burning/discomfort for half an hour after urinating  He also sees pain management tomorrow in regards to his symptoms  Objective: See treatment diary below      Assessment: Tolerated treatment well  Patient did well with exercises today  He did have some mild increased sensitivity with half kneeling hip flexor stretch  He noted feeling a comfortable stretch and emphasized a small range of motion  Next visit, consider nerve glides on the left as this is where he has the most discomfort  Also discussed work space and positioning as 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 be starting a new job in two weeks  Recommended modifying/changing position to avoid too much pressure on the lower abdominal/suprapubic region  Plan: Continue per plan of care        Precautions: anxiety  [employer] HEP:    Manuals 2/3 2/14 2/21 3/1 3/8  3/15 3/21 4/12 4/18                 STM mobilization     Skin rolling lumbar spine    10 min Skin rolling lumbar spin  8 min       Abdominal Tri Planar Myofascial release  15 min + skin rolling 15 min 15 min  10 min       Pelvic Floor Muscle Releases  assessment   10 min  8 min   10 min + assessment 15 min + assessment     Perineal STM/adductors       15 min                    Patient Education             Neuro Re-Ed   10 min          Real Time Ultrasound             TA ADIM             PFMC slow holds             Diaphragmatic Breathing  10 min 5 min    10 min + guided mindufulness      DKC with Diaphragmatic Breathing  30 sec x 3           Child's Pose with DiaphragmaticBreathing   30 sec x 3 30 sec x 3 30 sec x 3 30 sec x 3 NP - pain      Happy Baby Pose       30 sec x 3  30 sec x 3    Body Scan for PFM release/relaxatoin             Pelvic Floor Drops in deep squat                                                                              Ther Ex             Hamstring stretch             Piriformis stretch  review           Hip adductor/groin  stretch  review       seated   30"x3    SKC stretch  20 sec x 2   30 sec x 2    10 sec x 5 ea    Prone on elbows   5"x5 ea 5"x5 ea 5"x5 2 minutes       Cat/cow   5"x5 ea 5"x10 ea 5"x10 ea    5"x10    Half kneeling hip flexor stretch         10"x2 ea                                                                      Ther Activity             EDUCATION/COUNSELING   30 min 15 min  15 min 10 min 25 min     Dilator Training             Gait Training                                       Modalities

## 2022-04-19 ENCOUNTER — CONSULT (OUTPATIENT)
Dept: PAIN MEDICINE | Facility: CLINIC | Age: 37
End: 2022-04-19
Payer: COMMERCIAL

## 2022-04-19 VITALS
WEIGHT: 192 LBS | DIASTOLIC BLOOD PRESSURE: 68 MMHG | HEIGHT: 74 IN | SYSTOLIC BLOOD PRESSURE: 110 MMHG | BODY MASS INDEX: 24.64 KG/M2 | HEART RATE: 69 BPM | RESPIRATION RATE: 18 BRPM

## 2022-04-19 DIAGNOSIS — G89.29 CHRONIC BILATERAL LOW BACK PAIN WITH BILATERAL SCIATICA: ICD-10-CM

## 2022-04-19 DIAGNOSIS — G62.9 NEUROPATHY: ICD-10-CM

## 2022-04-19 DIAGNOSIS — G89.4 CHRONIC PAIN SYNDROME: Primary | ICD-10-CM

## 2022-04-19 DIAGNOSIS — M51.16 LUMBAR DISC HERNIATION WITH RADICULOPATHY: ICD-10-CM

## 2022-04-19 DIAGNOSIS — M54.42 CHRONIC BILATERAL LOW BACK PAIN WITH BILATERAL SCIATICA: ICD-10-CM

## 2022-04-19 DIAGNOSIS — M54.41 CHRONIC BILATERAL LOW BACK PAIN WITH BILATERAL SCIATICA: ICD-10-CM

## 2022-04-19 PROCEDURE — 99204 OFFICE O/P NEW MOD 45 MIN: CPT | Performed by: ANESTHESIOLOGY

## 2022-04-19 RX ORDER — MAGNESIUM 30 MG
30 TABLET ORAL 2 TIMES DAILY
COMMUNITY

## 2022-04-28 ENCOUNTER — OFFICE VISIT (OUTPATIENT)
Dept: PHYSICAL THERAPY | Facility: REHABILITATION | Age: 37
End: 2022-04-28
Payer: COMMERCIAL

## 2022-04-28 DIAGNOSIS — R10.2 CHRONIC PELVIC PAIN IN MALE: Primary | ICD-10-CM

## 2022-04-28 DIAGNOSIS — M62.89 PELVIC FLOOR DYSFUNCTION: ICD-10-CM

## 2022-04-28 DIAGNOSIS — G89.29 CHRONIC PELVIC PAIN IN MALE: Primary | ICD-10-CM

## 2022-04-28 DIAGNOSIS — M54.16 LUMBAR RADICULOPATHY: ICD-10-CM

## 2022-04-28 PROCEDURE — 97110 THERAPEUTIC EXERCISES: CPT | Performed by: PHYSICAL THERAPIST

## 2022-04-28 PROCEDURE — 97140 MANUAL THERAPY 1/> REGIONS: CPT | Performed by: PHYSICAL THERAPIST

## 2022-04-28 NOTE — PROGRESS NOTES
Daily Note     Today's date: 2022  Patient name: Silver Newman  : 1985  MRN: [id number]  Referring provider: Carlos Shine  Dx:   Encounter Diagnosis     ICD-10-CM    1  Chronic pelvic pain in male  R10 2     G89 29    2  Pelvic floor dysfunction  M62 89    3  Lumbar radiculopathy  M54 16        Start Time: 1400  Stop Time: 1500  Total time in clinic (min): 60 minutes    Subjective: The patient notes that he continues to do well overall  He has minimal sensitivity and numbness in his penis  He report that the sensitivity is more concentrated around the tip of his penis now  The pain is not as severe at worse and subsides more quickly  He does experience worsened symptoms for about a day after having intercourse  He has a tightness sensation  He reports feeling that his urine stream starts more easily but he still has a stopping in the middle of his stream and then he needs to release and re start  He is seeing Urology tomorrow  He saw pain management last week and notes that he may have injections in his spine to help with his back pain  Objective: See treatment diary below      Assessment: Tolerated treatment well  Patient did well with treatment today  No reports of pain with stretches/exercises  He notes some mild pulling around his umbilicus with manual therapy techniques to lower abdominal wall  No increased sensitivity during treatment today  Recommended trying stretches as well as perineal pelvic floor muscle release/massage, and a warm SITS bath to help reduce pelvic floor muscle tension after intercourse   Plan: Continue per plan of care        Precautions: anxiety  [employer] HEP:    Manuals 2/3 2/14 2/21 3/1 3/8  3/15 3/21 4/12 4/18 4/28                STM mobilization     Skin rolling lumbar spine    10 min Skin rolling lumbar spin  8 min    Lateral thoroaclumar 8 min  Skin rolling lumbar spine and lower abdomen  15 min total   Abdominal Tri Planar Myofascial release  15 min + skin rolling 15 min 15 min  10 min    8 min   Pelvic Floor Muscle Releases  assessment   10 min  8 min   10 min + assessment 15 min + assessment     Perineal STM/adductors       15 min                    Patient Education             Neuro Re-Ed   10 min          Real Time Ultrasound             TA ADIM             [facility] slow holds             Diaphragmatic Breathing  10 min 5 min    10 min + guided mindufulness      DKC with Diaphragmatic Breathing  30 sec x 3           Child's Pose with DiaphragmaticBreathing   30 sec x 3 30 sec x 3 30 sec x 3 30 sec x 3 NP - pain      Happy Baby Pose       30 sec x 3  30 sec x 3 30 sex x 3   Body Scan for PFM release/relaxatoin             Pelvic Floor Drops in deep squat                                                                              Ther Ex             Hamstring stretch             Piriformis stretch  review           Hip adductor/groin  stretch  review       seated   30"x3 seated   30"x3    SKC stretch  20 sec x 2   30 sec x 2    10 sec x 5 ea    Prone on elbows   5"x5 ea 5"x5 ea 5"x5 2 minutes       Prone press ups          10"x10   Cat/cow   5"x5 ea 5"x10 ea 5"x10 ea    5"x10 5"x10   Half kneeling hip flexor stretch         10"x2 ea                                                                      Ther Activity             EDUCATION/COUNSELING   30 min 15 min  15 min 10 min 25 min  15 min   Dilator Training             Gait Training                                       Modalities

## 2022-04-28 NOTE — PROGRESS NOTES
4/29/2022      Chief Complaint   Patient presents with    Pelvic Pain     Assessment and Plan    1  Pelvic pain  2  Penile pain/paresthesias  3  Intermittent dysuria  4  Lumbar disc herniation with radiculopathy   - Continue pelvic floor physical therapy and treatment with pain management for lumbar spinal issues  At this time I feel his symptoms are related to musculoskeletal cause and nerve irritation  Would recommend proceeding with lumbar epidural injection to see if this resolves his penile pain  Additionally recommend he proceed with EMG with neurology as scheduled  - Continue gabapentin as prescribed by PCP   - Could potentially consider cystoscopy for full urologic workup  I would advise he further pursue treatment and work-up with pain management and neurology at this time  If any ongoing irritative voiding symptoms, pelvic pain, or penile pain could undergo cystoscopy at that time and trial hydroxyzine for treatment of possible painful bladder syndrome/pelvic pain syndrome  History of Present Illness  Flaco Strong is a 29 y o  male here for follow up evaluation of  pelvic and penile pain  Patient had initially underwent treatment for presumed prostatitis and urethritis with our office with ongoing symptoms  He underwent ultrasound of the kidney and bladder which was normal   He has had a normal prostate exam   He was referred to pelvic floor physical therapy at previous office visit which has been effective  Was started on gabapentin by his PCP  He has seen neurologist as well as pain management and underwent a lumbar MRI which revealed an L4-5 disc herniation  He has lower back pain and lower extremity radiculopathy  He was advised a pain management to undergo a lumbar epidural injection  Pain management felt that penile pain is due to potential nerve irritation in the anterior abdominal wall musculature  He does report improvement with pelvic floor physical therapy    He will be undergoing lumbar epidural steroid injection with pain management and reports he will be following up with Neurology for EMG  He has continued penile had numbness as well as intermittent pain and intermittent dysuria  He occasionally will have starting and stopping urinary stream   Otherwise denies any other urinary complaints  Denies any testicular pain or scrotal swelling  Urine dip negative  PVR=74 mL     Review of Systems   Constitutional: Negative for chills and fever  Respiratory: Negative for shortness of breath  Cardiovascular: Negative for chest pain  Gastrointestinal: Negative for abdominal pain  Genitourinary: Positive for difficulty urinating, dysuria (intermittent) and penile pain  Negative for flank pain, frequency, hematuria, penile swelling, scrotal swelling, testicular pain and urgency  Neurological: Negative for dizziness             AUA SYMPTOM SCORE      Most Recent Value   AUA SYMPTOM SCORE    How often have you had a sensation of not emptying your bladder completely after you finished urinating? 1 (P)     How often have you had to urinate again less than two hours after you finished urinating? 4 (P)     How often have you found you stopped and started again several times when you urinate? 5 (P)     How often have you found it difficult to postpone urination? 0 (P)     How often have you had a weak urinary stream? 0 (P)     How often have you had to push or strain to begin urination? 0 (P)     How many times did you most typically get up to urinate from the time you went to bed at night until the time you got up in the morning? 1 (P)     Quality of Life: If you were to spend the rest of your life with your urinary condition just the way it is now, how would you feel about that? 3 (P)     AUA SYMPTOM SCORE 11 (P)              Past Medical History  Past Medical History:   Diagnosis Date    Back pain     Bloody ejaculation     Celiac disease     Depression        Past Social History  No past surgical history on file  Social History     Tobacco Use   Smoking Status Never Smoker   Smokeless Tobacco Never Used       Past Family History  Family History   Problem Relation Age of Onset    Diabetes Maternal Grandmother     Heart disease Paternal Grandfather     No Known Problems Mother     No Known Problems Father        Past Social history  Social History     Socioeconomic History    Marital status: /Civil Union     Spouse name: Not on file    Number of children: Not on file    Years of education: Not on file    Highest education level: Not on file   Occupational History    Not on file   Tobacco Use    Smoking status: Never Smoker    Smokeless tobacco: Never Used   Vaping Use    Vaping Use: Never used   Substance and Sexual Activity    Alcohol use: Yes    Drug use: Never    Sexual activity: Yes   Other Topics Concern    Not on file   Social History Narrative    Not on file     Social Determinants of Health     Financial Resource Strain: Not on file   Food Insecurity: Not on file   Transportation Needs: Not on file   Physical Activity: Inactive    Days of Exercise per Week: 0 days    Minutes of Exercise per Session: 0 min   Stress: Stress Concern Present    Feeling of Stress :  To some extent   Social Connections: Not on file   Intimate Partner Violence: Not on file   Housing Stability: Not on file       Current Medications  Current Outpatient Medications   Medication Sig Dispense Refill    busPIRone (BUSPAR) 5 mg tablet Take 1 tablet (5 mg total) by mouth 2 (two) times a day 180 tablet 3    cetirizine (ZyrTEC) 10 mg tablet Take 10 mg by mouth daily      gabapentin (Neurontin) 300 mg capsule Take 1 capsule (300 mg total) by mouth daily at bedtime 30 capsule 1    magnesium 30 MG tablet Take 30 mg by mouth 2 (two) times a day      multivitamin (THERAGRAN) TABS Take 1 tablet by mouth daily      QUERCETIN PO Take 2 capsules by mouth in the morning 800 mg       No current facility-administered medications for this visit  Allergies  Allergies   Allergen Reactions    Bactrim [Sulfamethoxazole-Trimethoprim] Hives    Strawberry Extract - Food Allergy Other (See Comments)     throat tightness         The following portions of the patient's history were reviewed and updated as appropriate: allergies, current medications, past medical history, past social history, past surgical history and problem list       Vitals  Vitals:    04/29/22 1528   BP: 112/68   Pulse: 81   SpO2: 99%   Weight: 86 2 kg (190 lb)   Height: 6' 2" (1 88 m)           Physical Exam  Physical Exam  Constitutional:       Appearance: Normal appearance  HENT:      Head: Normocephalic and atraumatic  Right Ear: External ear normal       Left Ear: External ear normal    Eyes:      General: No scleral icterus  Conjunctiva/sclera: Conjunctivae normal    Cardiovascular:      Pulses: Normal pulses  Pulmonary:      Effort: Pulmonary effort is normal    Musculoskeletal:         General: Normal range of motion  Cervical back: Normal range of motion  Neurological:      General: No focal deficit present  Mental Status: He is alert and oriented to person, place, and time  Psychiatric:         Mood and Affect: Mood normal          Behavior: Behavior normal          Thought Content:  Thought content normal          Judgment: Judgment normal            Results  Recent Results (from the past 1 hour(s))   POCT Measure PVR    Collection Time: 04/29/22  3:35 PM   Result Value Ref Range    POST-VOID RESIDUAL VOLUME, ML POC 79 mL   POCT urine dip    Collection Time: 04/29/22  3:35 PM   Result Value Ref Range    LEUKOCYTE ESTERASE,UA n     NITRITE,UA n     SL AMB POCT UROBILINOGEN 0 2     POCT URINE PROTEIN n      PH,UA 6 0     BLOOD,UA n     SPECIFIC GRAVITY,UA 1 015     KETONES,UA n     BILIRUBIN,UA n     GLUCOSE, UA n      COLOR,UA yellow     CLARITY,UA clear    ]  No results found for: PSA  Lab Results   Component Value Date    CALCIUM 9 7 02/24/2022    K 3 7 02/24/2022    CO2 30 02/24/2022     02/24/2022    BUN 19 02/24/2022    CREATININE 1 03 02/24/2022     Lab Results   Component Value Date    WBC 9 46 02/24/2022    HGB 14 8 02/24/2022    HCT 41 4 02/24/2022    MCV 91 02/24/2022     02/24/2022           Orders  Orders Placed This Encounter   Procedures    POCT Measure PVR    POCT urine dip       Melonie Selby PA-C

## 2022-04-29 ENCOUNTER — OFFICE VISIT (OUTPATIENT)
Dept: UROLOGY | Facility: CLINIC | Age: 37
End: 2022-04-29
Payer: COMMERCIAL

## 2022-04-29 VITALS
BODY MASS INDEX: 24.38 KG/M2 | HEIGHT: 74 IN | SYSTOLIC BLOOD PRESSURE: 112 MMHG | HEART RATE: 81 BPM | OXYGEN SATURATION: 99 % | WEIGHT: 190 LBS | DIASTOLIC BLOOD PRESSURE: 68 MMHG

## 2022-04-29 DIAGNOSIS — R20.2 PARESTHESIA: ICD-10-CM

## 2022-04-29 DIAGNOSIS — G62.9 NEUROPATHY: ICD-10-CM

## 2022-04-29 DIAGNOSIS — N34.2 URETHRITIS: Primary | ICD-10-CM

## 2022-04-29 LAB
POST-VOID RESIDUAL VOLUME, ML POC: 79 ML
SL AMB  POCT GLUCOSE, UA: NORMAL
SL AMB LEUKOCYTE ESTERASE,UA: NORMAL
SL AMB POCT BILIRUBIN,UA: NORMAL
SL AMB POCT BLOOD,UA: NORMAL
SL AMB POCT CLARITY,UA: CLEAR
SL AMB POCT COLOR,UA: YELLOW
SL AMB POCT KETONES,UA: NORMAL
SL AMB POCT NITRITE,UA: NORMAL
SL AMB POCT PH,UA: 6
SL AMB POCT SPECIFIC GRAVITY,UA: 1.01
SL AMB POCT URINE PROTEIN: NORMAL
SL AMB POCT UROBILINOGEN: 0.2

## 2022-04-29 PROCEDURE — 99213 OFFICE O/P EST LOW 20 MIN: CPT | Performed by: PHYSICIAN ASSISTANT

## 2022-04-29 PROCEDURE — 3008F BODY MASS INDEX DOCD: CPT | Performed by: ANESTHESIOLOGY

## 2022-04-29 PROCEDURE — 81002 URINALYSIS NONAUTO W/O SCOPE: CPT | Performed by: PHYSICIAN ASSISTANT

## 2022-04-29 PROCEDURE — 51798 US URINE CAPACITY MEASURE: CPT | Performed by: PHYSICIAN ASSISTANT

## 2022-04-29 RX ORDER — GABAPENTIN 300 MG/1
300 CAPSULE ORAL
Qty: 30 CAPSULE | Refills: 0 | Status: SHIPPED | OUTPATIENT
Start: 2022-04-29 | End: 2022-06-11 | Stop reason: SDUPTHER

## 2022-05-02 ENCOUNTER — TELEPHONE (OUTPATIENT)
Dept: PAIN MEDICINE | Facility: CLINIC | Age: 37
End: 2022-05-02

## 2022-05-02 NOTE — TELEPHONE ENCOUNTER
Pt called in to schedule his procedure   Please be advised thank you    Pt can be reached @  [phone]

## 2022-05-03 NOTE — TELEPHONE ENCOUNTER
S/w pt and scheduled LESI for 6/1/22 230 pm  Gave pre procedure instructions and /vaccine policy  Pt is scheduled for booster on 5/6/22  [facility]

## 2022-06-01 ENCOUNTER — HOSPITAL ENCOUNTER (OUTPATIENT)
Dept: RADIOLOGY | Facility: CLINIC | Age: 37
Discharge: HOME | End: 2022-06-01
Attending: ANESTHESIOLOGY
Payer: COMMERCIAL

## 2022-06-01 ENCOUNTER — PROCEDURE VISIT (OUTPATIENT)
Dept: NEUROLOGY | Facility: CLINIC | Age: 37
End: 2022-06-01
Payer: COMMERCIAL

## 2022-06-01 VITALS
HEART RATE: 70 BPM | RESPIRATION RATE: 18 BRPM | TEMPERATURE: 98.4 F | OXYGEN SATURATION: 99 % | DIASTOLIC BLOOD PRESSURE: 68 MMHG | SYSTOLIC BLOOD PRESSURE: 124 MMHG

## 2022-06-01 DIAGNOSIS — M54.42 CHRONIC BILATERAL LOW BACK PAIN WITH BILATERAL SCIATICA: ICD-10-CM

## 2022-06-01 DIAGNOSIS — M54.41 CHRONIC BILATERAL LOW BACK PAIN WITH BILATERAL SCIATICA: ICD-10-CM

## 2022-06-01 DIAGNOSIS — R20.2 PARESTHESIA: ICD-10-CM

## 2022-06-01 DIAGNOSIS — M51.16 LUMBAR DISC HERNIATION WITH RADICULOPATHY: ICD-10-CM

## 2022-06-01 DIAGNOSIS — G89.29 CHRONIC BILATERAL LOW BACK PAIN WITH BILATERAL SCIATICA: ICD-10-CM

## 2022-06-01 DIAGNOSIS — G89.4 CHRONIC PAIN SYNDROME: ICD-10-CM

## 2022-06-01 PROCEDURE — 95886 MUSC TEST DONE W/N TEST COMP: CPT | Performed by: PHYSICAL MEDICINE & REHABILITATION

## 2022-06-01 PROCEDURE — 62323 NJX INTERLAMINAR LMBR/SAC: CPT | Performed by: ANESTHESIOLOGY

## 2022-06-01 PROCEDURE — 95911 NRV CNDJ TEST 9-10 STUDIES: CPT | Performed by: PHYSICAL MEDICINE & REHABILITATION

## 2022-06-01 RX ORDER — 0.9 % SODIUM CHLORIDE 0.9 %
10 VIAL (ML) INJECTION ONCE
Status: DISCONTINUED | OUTPATIENT
Start: 2022-06-01 | End: 2022-06-05 | Stop reason: HOSPADM

## 2022-06-01 RX ORDER — LIDOCAINE HYDROCHLORIDE 10 MG/ML
5 INJECTION, SOLUTION EPIDURAL; INFILTRATION; INTRACAUDAL; PERINEURAL ONCE
Status: DISCONTINUED | OUTPATIENT
Start: 2022-06-01 | End: 2022-06-05 | Stop reason: HOSPADM

## 2022-06-01 RX ORDER — METHYLPREDNISOLONE ACETATE 80 MG/ML
80 INJECTION, SUSPENSION INTRA-ARTICULAR; INTRALESIONAL; INTRAMUSCULAR; PARENTERAL; SOFT TISSUE ONCE
Status: COMPLETED | OUTPATIENT
Start: 2022-06-01 | End: 2022-06-01

## 2022-06-01 RX ADMIN — METHYLPREDNISOLONE ACETATE 80 MG: 80 INJECTION, SUSPENSION INTRA-ARTICULAR; INTRALESIONAL; INTRAMUSCULAR; PARENTERAL; SOFT TISSUE at 14:48

## 2022-06-01 NOTE — PROGRESS NOTES
EMG 2 limb lower extremity     Date/Time 6/1/2022 12:27 PM     Performed by  Heraclio Jimenez MD     Authorized by Kaz Duarte, [facility]                [facility] AT [address]  [phone]    Electromyography & Nerve Conduction Studies Report          Full Name: Florentin Borjas Gender: Male  MRN: [id number] YOB: 1985      Visit Date: 6/1/2022 11:27 AM  Age: 40 Years  Examining Physician: Heraclio Jimenez MD   Referring Physician: Lorenzo LOPEZ    Medical History:  46-year-old male presents with complaints of low back pain radiating down both legs, worse on the right side associated with intermittent paresthesia into his pennis  Patient is being evaluated for a lumbosacral radiculopathy versus plexopathy  TEMP 32      Sensory Nerve Conduction Study       Nerve / Sites Rec  Site Onset Lat Peak Lat  Amp Segments Distance Velocity     ms ms µV  cm m/s   R Sural - (Antidromic)      Calf Ankle 1 8 2 8 13 0 Calf - Ankle 14 79      Ref  ?4 4 ? 6 0 Ref  ?40   L Sural - (Antidromic)      Calf Ankle 2 4 3 0 14 4 Calf - Ankle 14 58      Ref  ?4 4 ? 6 0 Ref  ?40   R Superficial peroneal - (Antidromic)      Lat leg Ankle 3 1 4 5 5 8 Lat leg - Ankle 14 45      Ref  ?4 4 ? 6 0 Ref  ?40   L Superficial peroneal - (Antidromic)      Lat leg Ankle 2 4 3 3 9 9 Lat leg - Ankle 14 58      Ref  ?4 4 ? 6 0 Ref  ?40       Motor Nerve Conduction Study       Nerve / Sites Muscle Latency Ref  Amplitude Ref  Segments Distance Lat Diff Velocity Ref  ms ms mV mV  cm ms m/s m/s   R Peroneal - EDB      Ankle EDB 4 3 ?6 5 6 3 ?2 0 Ankle - EDB 9         B  Fib Head EDB 11 3  6 2  B  Fib Head - Ankle 32 7 00 46 ?44      A  Fib Head EDB 12 8  6 0  A  Fib Head - B  Fib Head 10 1 48 68 ?44   L Peroneal - EDB      Ankle EDB 3 0 ?6 5 9 0 ?2 0 Ankle - EDB 9         B  Fib Head EDB 10 5  9 0  B  Fib Head - Ankle 33 7 52 44 ?44      A   Fib Head EDB 12 1  8 7 A  Fib Head - B  Fib Head 10 1 56 64 ?44   R Tibial - AH      Ankle AH 4 5 ?5 8 15 5 ?4 0 Ankle - AH 9         Knee AH 12 7  12 0  Knee - Ankle 38 8 17 47 ?41   L Tibial - AH      Ankle AH 4 5 ?5 8 12 3 ?4 0 Ankle - AH 9         Knee AH 12 8  9 5  Knee - Ankle 38 5 8 33 46 ?41       F Waves       Nerve F Latency Ref  ms ms   R Peroneal - EDB 48 4 ?56 0   R Tibial - AH 49 7 ?56 0   L Peroneal - EDB 48 4 ?56 0   L Tibial - AH 47 9 ?56 0       H Reflex       Nerve H Latency    ms   R Tibial - Soleus 30 5   L Tibial - Soleus 32  0       EMG Summary Table     Spontaneous MUAP Recruitment   Muscle Nerve Roots IA Fib PSW Fasc H F  Dur  Amp PPP Config Pattern   L  Tibialis anterior Deep peroneal (Fibular) L4-L5 NL None None None None NL Sl  Incr  None NL Reduced   R  Tibialis anterior Deep peroneal (Fibular) L4-L5 NL None None None None NL NL Many NL Reduced   L  Gluteus medius Superior gluteal L4-S1 NL None None None None NL Sl  Incr  None NL Reduced   R  Gluteus medius Superior gluteal L4-S1 NL None None None None Gr  Incr  Valley Martinez  None NL Reduced   L  Lumbar paraspinals Spinal L1-L5 NL None None None None NL NL None NL NL   R  Lumbar paraspinals Spinal L1-L5 NL None None None None NL NL None NL NL   L  Gastrocnemius (Medial head) Tibial S1-S2 NL None None None None Gr  Incr  NL None NL Reduced   R  Gastrocnemius (Medial head) Tibial S1-S2 NL None None None None NL Sl  Incr  None NL Reduced   L  Quadriceps Femoral L2-L4 NL None None None None NL NL None NL NL   R  Quadriceps Femoral L2-L4 NL None None None None NL NL None NL NL   L  Extensor digitorum brevis Tibial L5-S1 NL None None None None Sl  Incr  NL None NL Reduced   R  Extensor digitorum brevis Tibial L5-S1 NL None None None None NL NL Many NL Reduced                                   Summary        Motor and sensory conduction studies were performed on the bilateral peroneal, tibial and sural nerves   The right peroneal compound motor action potential was normal   The left peroneal and bilateral tibial compound motor action potentials were normal     Bilateral peroneal and tibial F waves were normal     Bilateral sural distal sensory latencies were normal with normal sensory action potential amplitudes  The left superficial peroneal sensory action potential was normal   The right superficial peroneal sensory peak latency was prolonged with a low sensory action potential amplitude and normal conduction velocity across the ankle  H  reflexes were symmetrically normal     Concentric needle EMG was performed on various distal and proximal muscles of the lower extremities bilaterally including EDB, tibialis anterior, gastrocnemius medius, vastus lateralis, gluteus medius and the low lumbar paraspinal regions  There was no evidence of spontaneous activity seen  Mild to moderate decreased recruitment of giant motor units was noted in the bilateral gluteus medius, tibialis anterior and extensor digitorum brevis  Mild decreased recruitment of giant motor units was noted in the bilateral medial gastrocnemius  The compound motor unit potentials were of normal configuration and interference patterns were full or full for effort in the remaining muscles tested  Impression:    Abnormal study  There is electrophysiologic evidence of a:    1  Chronic bilateral L5-S1 radiculopathy as evidenced by the decreased recruitment and chronic denervation changes in the above-mentioned muscles  2  There is no evidence of a lumbosacral plexopathy bilaterally

## 2022-06-01 NOTE — DISCHARGE INSTR - LAB
Epidural Steroid Injection   WHAT YOU NEED TO KNOW:   An epidural steroid injection (RENAN) is a procedure to inject steroid medicine into the epidural space  The epidural space is between your spinal cord and vertebrae  Steroids reduce inflammation and fluid buildup in your spine that may be causing pain  You may be given pain medicine along with the steroids  ACTIVITY  Do not drive or operate machinery today  No strenuous activity today - bending, lifting, etc   You may resume normal activites starting tomorrow - start slowly and as tolerated  You may shower today, but no tub baths or hot tubs  You may have numbness for several hours from the local anesthetic  Please use caution and common sense, especially with weight-bearing activities  CARE OF THE INJECTION SITE  If you have soreness or pain, apply ice to the area today (20 minutes on/20 minutes off)  Starting tomorrow, you may use warm, moist heat or ice if needed  You may have an increase or change in your discomfort for 36-48 hours after your treatment  Apply ice and continue with any pain medication you have been prescribed  Notify the [facility] if you have any of the following: redness, drainage, swelling, headache, stiff neck or fever above 100°F     SPECIAL INSTRUCTIONS  Our office will contact you in approximately 7 days for a progress report  MEDICATIONS  Continue to take all routine medications  Our office may have instructed you to hold some medications  As no general anesthesia was used in today's procedure, you should not experience any side effects related to anesthesia  If you have a problem specifically related to your procedure, please call our office at [phone]  Problems not related to your procedure should be directed to your primary care physician

## 2022-06-01 NOTE — H&P
History of Present Illness: The patient is a 40 y o  male who presents with complaints of low back pain    Patient Active Problem List   Diagnosis    Celiac disease    Intrinsic eczema    Generalized anxiety disorder    Dysuria    Penile irritation    Depression, recurrent ([address] )    Paresthesia    Neuropathy    Lumbar radiculopathy       Past Medical History:   Diagnosis Date    Back pain     Bloody ejaculation     Celiac disease     Depression        No past surgical history on file  Current Outpatient Medications:     busPIRone (BUSPAR) 5 mg tablet, Take 1 tablet (5 mg total) by mouth 2 (two) times a day, Disp: 180 tablet, Rfl: 3    cetirizine (ZyrTEC) 10 mg tablet, Take 10 mg by mouth daily, Disp: , Rfl:     gabapentin (Neurontin) 300 mg capsule, Take 1 capsule (300 mg total) by mouth daily at bedtime, Disp: 30 capsule, Rfl: 0    magnesium 30 MG tablet, Take 30 mg by mouth 2 (two) times a day, Disp: , Rfl:     multivitamin (THERAGRAN) TABS, Take 1 tablet by mouth daily, Disp: , Rfl:     QUERCETIN PO, Take 2 capsules by mouth in the morning 800 mg, Disp: , Rfl:     Allergies   Allergen Reactions    Bactrim [Sulfamethoxazole-Trimethoprim] Hives    Strawberry Extract - Food Allergy Other (See Comments)     throat tightness       Physical Exam: There were no vitals filed for this visit  General: Awake, Alert, Oriented x 3, Mood and affect appropriate  Respiratory: Respirations even and unlabored  Cardiovascular: Peripheral pulses intact; no edema  Musculoskeletal Exam:  Tenderness in lumbar spine region    ASA Score: 2         Assessment:   1  Chronic pain syndrome    2  Chronic bilateral low back pain with bilateral sciatica    3   Lumbar disc herniation with radiculopathy        Plan: L4-L5 lumbar epidural steroid injection

## 2022-06-06 ENCOUNTER — OFFICE VISIT (OUTPATIENT)
Dept: PHYSICAL THERAPY | Facility: REHABILITATION | Age: 37
End: 2022-06-06
Payer: COMMERCIAL

## 2022-06-06 DIAGNOSIS — R20.2 PARESTHESIA: Primary | ICD-10-CM

## 2022-06-06 PROCEDURE — 97140 MANUAL THERAPY 1/> REGIONS: CPT | Performed by: PHYSICAL THERAPIST

## 2022-06-06 PROCEDURE — 97110 THERAPEUTIC EXERCISES: CPT | Performed by: PHYSICAL THERAPIST

## 2022-06-06 NOTE — PROGRESS NOTES
Daily Note     Today's date: 2022  Patient name: Yohan Leonardo  : 1985  MRN: [id number]  Referring provider: Kaur Brice  Dx:   Encounter Diagnosis     ICD-10-CM    1  Paresthesia  R20 2        Start Time: 1800  Stop Time: 0  Total time in clinic (min): 50 minutes    Subjective: The patient notes that he had an epidural last Wednesday  He reports that he has not seen any changes in his R LE symptoms  He reports that he has been doing well overall  He has had very little sensitivity at the tip of his penis  When he does, it is not as severe and it resolves very quickly  He continues to take Gabapentin and Magnesium which seem to both have helped  He is compliant with his stretches and exercises as well  He did go through a third party testing and he notes that it came back + for bacteria  He is going to start an antibiotic 2 weeks after the epidural was administered  He notes feeling 95% better at this time  Objective: See treatment diary below      Assessment: Tolerated treatment well  Patient had no complaints with exercises today  He has been compliant with exercises/stretches at home  He is going to continue with these for the next few weeks then will return for a follow up in 3 weeks  Determine future plan of care at that time  Plan: Continue per plan of care        Precautions: anxiety  [employer] HEP:    Manuals 6/6 2/14 2/21 3/1 3/8  3/15 3/21 4/12 4/18 4/28                STM mobilization     Skin rolling lumbar spine    10 min Skin rolling lumbar spin  8 min    Lateral thoroaclumar 8 min  Skin rolling lumbar spine and lower abdomen  15 min total   Abdominal Tri Planar Myofascial release 10 min 15 min + skin rolling 15 min 15 min  10 min    8 min   Pelvic Floor Muscle Releases  assessment   10 min  8 min   10 min + assessment 15 min + assessment     Perineal STM/adductors       15 min                    Patient Education             Neuro Re-Ed   10 min          Real Time Ultrasound             TA ADIM             PFMC slow holds             Diaphragmatic Breathing  10 min 5 min    10 min + guided mindufulness      DKC with Diaphragmatic Breathing  30 sec x 3           Child's Pose with DiaphragmaticBreathing 60 sec x2  30 sec x 3 30 sec x 3 30 sec x 3 30 sec x 3 NP - pain      Happy Baby Pose 60 sec x 2      30 sec x 3  30 sec x 3 30 sex x 3   Body Scan for PFM release/relaxatoin             Pelvic Floor Drops in deep squat                                                                              Ther Ex             Hamstring stretch             Piriformis stretch  review           Hip adductor/groin  stretch  review       seated   30"x3 seated   30"x3    SKC stretch  20 sec x 2   30 sec x 2    10 sec x 5 ea    Prone on elbows   5"x5 ea 5"x5 ea 5"x5 2 minutes       Prone press ups          10"x10   Cat/cow   5"x5 ea 5"x10 ea 5"x10 ea    5"x10 5"x10   Half kneeling hip flexor stretch         10"x2 ea                                                                      Ther Activity             EDUCATION/COUNSELING   30 min 15 min  15 min 10 min 25 min  15 min   Dilator Training             Gait Training                                       Modalities

## 2022-06-07 NOTE — PROGRESS NOTES
Discharge  Patient did not return to PT for this episode, as a result, discharge this episode from PT

## 2022-06-08 ENCOUNTER — TELEPHONE (OUTPATIENT)
Dept: PAIN MEDICINE | Facility: CLINIC | Age: 37
End: 2022-06-08

## 2022-06-08 NOTE — TELEPHONE ENCOUNTER
Pt reports slight improvement post inj   Pain level 2/10  Pt aware I will call next week for an update

## 2022-06-11 ENCOUNTER — NURSE TRIAGE (OUTPATIENT)
Dept: INTERNAL MEDICINE CLINIC | Facility: CLINIC | Age: 37
End: 2022-06-11

## 2022-06-11 DIAGNOSIS — R20.2 PARESTHESIA: ICD-10-CM

## 2022-06-11 DIAGNOSIS — G62.9 NEUROPATHY: ICD-10-CM

## 2022-06-11 RX ORDER — GABAPENTIN 300 MG/1
300 CAPSULE ORAL
Qty: 30 CAPSULE | Refills: 2 | Status: SHIPPED | OUTPATIENT
Start: 2022-06-11

## 2022-06-11 NOTE — TELEPHONE ENCOUNTER
Reason for Disposition   [1] Caller requesting a prescription renewal (no refills left), no triage required, AND [2] triager able to renew prescription per department policy    Answer Assessment - Initial Assessment Questions  1  NAME of MEDICATION: "What medicine are you calling about?"      Gabapentin    2  QUESTION: "What is your question?" (e g , medication refill, side effect)  Refill    3  PRESCRIBING HCP: "Who prescribed it?" Reason: if prescribed by specialist, call should be referred to that group  Rhianna Patrick    4  SYMPTOMS: "Do you have any symptoms?"   no    5   SEVERITY: If symptoms are present, ask "Are they mild, moderate or severe?"     N/a    Protocols used: MEDICATION QUESTION CALL-ADULT-

## 2022-06-11 NOTE — TELEPHONE ENCOUNTER
Regarding: out of gabapentin  ----- Message from Huma Kenny sent at 6/11/2022 12:14 PM EDT -----  "I am out of my gabapentin and I leave for vacation "

## 2022-06-13 ENCOUNTER — APPOINTMENT (OUTPATIENT)
Dept: PHYSICAL THERAPY | Facility: REHABILITATION | Age: 37
End: 2022-06-13
Payer: COMMERCIAL

## 2022-06-20 ENCOUNTER — APPOINTMENT (OUTPATIENT)
Dept: PHYSICAL THERAPY | Facility: REHABILITATION | Age: 37
End: 2022-06-20
Payer: COMMERCIAL

## 2022-06-20 DIAGNOSIS — F41.1 GENERALIZED ANXIETY DISORDER: ICD-10-CM

## 2022-06-20 RX ORDER — BUSPIRONE HYDROCHLORIDE 5 MG/1
5 TABLET ORAL 2 TIMES DAILY
Qty: 180 TABLET | Refills: 3 | Status: SHIPPED | OUTPATIENT
Start: 2022-06-20 | End: 2022-08-05 | Stop reason: SDUPTHER

## 2022-06-27 ENCOUNTER — APPOINTMENT (OUTPATIENT)
Dept: PHYSICAL THERAPY | Facility: REHABILITATION | Age: 37
End: 2022-06-27
Payer: COMMERCIAL

## 2022-07-19 NOTE — PROGRESS NOTES
PT Discharge    Today's date: 2022  Patient name: Steph Duncan  : 1985  MRN: [id number]  Referring provider: Moises Barahona  Dx:   Encounter Diagnosis     ICD-10-CM    1  Paresthesia  R20 2        Start Time: 1800  Stop Time: 1850  Total time in clinic (min): 50 minutes    Assessment/Plan: The patient was treated for a total of 11 visits including his IE on 2/3  He cancelled his last two remaining appointments as he was doing better overall  Did talk with the patient prior to discharge  He may contact PT with any future questions/concerns or if he would like to return to treatment       Pelvic Floor Subjective     Objective    Flowsheet Rows    Flowsheet Row Most Recent Value   PT/OT G-Codes    Current Score 38   Projected Score 0

## 2022-08-05 ENCOUNTER — APPOINTMENT (OUTPATIENT)
Dept: LAB | Facility: CLINIC | Age: 37
End: 2022-08-05
Payer: COMMERCIAL

## 2022-08-05 ENCOUNTER — OFFICE VISIT (OUTPATIENT)
Dept: INTERNAL MEDICINE CLINIC | Facility: CLINIC | Age: 37
End: 2022-08-05
Payer: COMMERCIAL

## 2022-08-05 VITALS
HEIGHT: 74 IN | OXYGEN SATURATION: 96 % | SYSTOLIC BLOOD PRESSURE: 114 MMHG | HEART RATE: 79 BPM | TEMPERATURE: 97.7 F | WEIGHT: 196.6 LBS | RESPIRATION RATE: 18 BRPM | DIASTOLIC BLOOD PRESSURE: 72 MMHG | BODY MASS INDEX: 25.23 KG/M2

## 2022-08-05 DIAGNOSIS — R10.2 PELVIC PAIN: ICD-10-CM

## 2022-08-05 DIAGNOSIS — F41.1 GENERALIZED ANXIETY DISORDER: Primary | ICD-10-CM

## 2022-08-05 PROBLEM — N94.89 PELVIC PRESSURE SYNDROME: Status: ACTIVE | Noted: 2022-08-05

## 2022-08-05 LAB
BACTERIA UR QL AUTO: NORMAL /HPF
BILIRUB UR QL STRIP: NEGATIVE
CLARITY UR: CLEAR
COLOR UR: YELLOW
GLUCOSE UR STRIP-MCNC: NEGATIVE MG/DL
HGB UR QL STRIP.AUTO: NEGATIVE
KETONES UR STRIP-MCNC: NEGATIVE MG/DL
LEUKOCYTE ESTERASE UR QL STRIP: NEGATIVE
NITRITE UR QL STRIP: NEGATIVE
NON-SQ EPI CELLS URNS QL MICRO: NORMAL /HPF
PH UR STRIP.AUTO: 7 [PH]
PROT UR STRIP-MCNC: ABNORMAL MG/DL
RBC #/AREA URNS AUTO: NORMAL /HPF
SP GR UR STRIP.AUTO: 1.02 (ref 1–1.03)
UROBILINOGEN UR STRIP-ACNC: <2 MG/DL
WBC #/AREA URNS AUTO: NORMAL /HPF

## 2022-08-05 PROCEDURE — 81001 URINALYSIS AUTO W/SCOPE: CPT | Performed by: NURSE PRACTITIONER

## 2022-08-05 PROCEDURE — 87086 URINE CULTURE/COLONY COUNT: CPT

## 2022-08-05 PROCEDURE — 99214 OFFICE O/P EST MOD 30 MIN: CPT | Performed by: NURSE PRACTITIONER

## 2022-08-05 RX ORDER — BUSPIRONE HYDROCHLORIDE 10 MG/1
10 TABLET ORAL 2 TIMES DAILY
Qty: 180 TABLET | Refills: 3 | Status: SHIPPED | OUTPATIENT
Start: 2022-08-05 | End: 2022-11-03

## 2022-08-05 NOTE — ASSESSMENT & PLAN NOTE
Patient continues with his generalized anxiety disorder  He was placed on BuSpar 5 mg twice a day  At 1st the patient states this did help him however he states now he is having issues with increased anxiety  He has recently changed jobs which is causing more stress  I will increase his BuSpar to 10 mg twice a day  Patient has been advised that he should contact our office in 2-3 weeks to make us aware of his anxiety level  He may need to go on a secondary medications such as Paxil or Prozac at that time  He denies any issues with depression

## 2022-08-05 NOTE — ASSESSMENT & PLAN NOTE
The patient continues with his lower pelvic pain  He had been seen by Urology in the past   He did have pelvic floor therapy which the patient states did help  He was tested for herpes in the past and last December was found to be positive for HSV 1 and did receive treatment  He never had any outbreaks or lesions  The patient has had several urinalysis and cultures performed through [facility] lab  all of which were negative  The patient states that in June he had a wellness visit through work and a Pseudomonas UTI was found in his urine and was treated  He continues with this lower pelvic pressure and pain  He has never had a cystoscopy done however in the last urology note this was mentioned by Urology that maybe his next option  Urinalysis and culture have been ordered for today  I did recommend to the patient that he contact Urology and make an appointment for further evaluation and management  I do not believe any imaging is warranted at this time

## 2022-08-05 NOTE — PROGRESS NOTES
[facility]    NAME: Art Risk  AGE: 40 y o  SEX: male  : 1985     DATE: 2022     Assessment and Plan:     Problem List Items Addressed This Visit        Other    Generalized anxiety disorder - Primary       Patient continues with his generalized anxiety disorder  He was placed on BuSpar 5 mg twice a day  At 1st the patient states this did help him however he states now he is having issues with increased anxiety  He has recently changed jobs which is causing more stress  I will increase his BuSpar to 10 mg twice a day  Patient has been advised that he should contact our office in 2-3 weeks to make us aware of his anxiety level  He may need to go on a secondary medications such as Paxil or Prozac at that time  He denies any issues with depression  Relevant Medications    busPIRone (BUSPAR) 10 mg tablet    Pelvic pain       The patient continues with his lower pelvic pain  He had been seen by Urology in the past   He did have pelvic floor therapy which the patient states did help  He was tested for herpes in the past and last December was found to be positive for HSV 1 and did receive treatment  He never had any outbreaks or lesions  The patient has had several urinalysis and cultures performed through [address] [place] [address]  all of which were negative  The patient states that in  he had a wellness visit through work and a Pseudomonas UTI was found in his urine and was treated  He continues with this lower pelvic pressure and pain  He has never had a cystoscopy done however in the last urology note this was mentioned by Urology that maybe his next option  Urinalysis and culture have been ordered for today  I did recommend to the patient that he contact Urology and make an appointment for further evaluation and management  I do not believe any imaging is warranted at this time           Relevant Orders    UA (URINE) with reflex to Scope    Urine culture              No follow-ups on file  Chief Complaint:     Chief Complaint   Patient presents with    Follow-up     Worsening anxiety, pelvic pain not improving        History of Present Illness:     Patient presents to the office today with concern of increased anxiety  His medications have been adjusted  In addition he continues with the issues with pelvic pain  He was evaluated by Urology  He did go to pelvic for physical therapy  He did see pain management after an MRI revealed issues with his lumbar discs  He recently had a positive urine culture back in June for Pseudomonas which was treated  His issues continue with the pelvic pain and pressure  Urinalysis and culture have been ordered  I have referred the patient back to urology for a potential cystoscopy  Review of Systems:     Review of Systems   Constitutional: Negative  Negative for fatigue  HENT: Negative  Negative for congestion, postnasal drip, rhinorrhea and trouble swallowing  Eyes: Negative  Negative for visual disturbance  Respiratory: Negative  Negative for choking and shortness of breath  Cardiovascular: Negative  Negative for chest pain  Gastrointestinal: Positive for abdominal pain (pelvic pain/pressure)  Endocrine: Negative  Genitourinary: Negative  Musculoskeletal: Negative  Negative for arthralgias, back pain, myalgias and neck pain  Skin: Negative  Neurological: Negative for dizziness and headaches  Psychiatric/Behavioral: The patient is nervous/anxious           Problem List:     Patient Active Problem List   Diagnosis    Celiac disease    Intrinsic eczema    Generalized anxiety disorder    Dysuria    Penile irritation    Depression, recurrent (HCC)    Paresthesia    Neuropathy    Lumbar radiculopathy    Pelvic pain        Objective:     /72 (BP Location: Left arm, Patient Position: Sitting, Cuff Size: Standard)   Pulse 79   Temp 97 7 °F (36 5 °C) (Temporal)   Resp 18   Ht 6' 2" (1 88 m)   Wt 89 2 kg (196 lb 9 6 oz)   SpO2 96%   BMI 25 24 kg/m²     Current Outpatient Medications   Medication Instructions    busPIRone (BUSPAR) 10 mg, Oral, 2 times daily    cetirizine (ZYRTEC) 10 mg, Oral, Daily    gabapentin (NEURONTIN) 300 mg, Oral, Daily at bedtime    magnesium 30 mg, Oral, 2 times daily    multivitamin (THERAGRAN) TABS 1 tablet, Oral, Daily    QUERCETIN PO 2 capsules, Oral, Daily, 800 mg         Physical Exam  Vitals reviewed  Constitutional:       Appearance: Normal appearance  HENT:      Head: Normocephalic and atraumatic  Nose: Nose normal       Mouth/Throat:      Mouth: Mucous membranes are moist    Eyes:      Extraocular Movements: Extraocular movements intact  Pupils: Pupils are equal, round, and reactive to light  Cardiovascular:      Rate and Rhythm: Normal rate and regular rhythm  Pulses: Normal pulses  Heart sounds: Normal heart sounds  Pulmonary:      Effort: Pulmonary effort is normal       Breath sounds: Normal breath sounds  Abdominal:      Tenderness: There is abdominal tenderness (suprapubic pain  pressure)  Musculoskeletal:         General: Normal range of motion  Skin:     General: Skin is warm  Neurological:      General: No focal deficit present  Mental Status: He is alert and oriented to person, place, and time  Psychiatric:         Mood and Affect: Mood is anxious  Behavior: Behavior normal          Thought Content:  Thought content normal          Judgment: Judgment normal          Jairo Hinojosa, [address]

## 2022-08-07 LAB — BACTERIA UR CULT: NORMAL

## 2022-09-05 DIAGNOSIS — R20.2 PARESTHESIA: ICD-10-CM

## 2022-09-05 DIAGNOSIS — G62.9 NEUROPATHY: ICD-10-CM

## 2022-09-05 RX ORDER — GABAPENTIN 300 MG/1
300 CAPSULE ORAL
Qty: 30 CAPSULE | Refills: 0 | Status: SHIPPED | OUTPATIENT
Start: 2022-09-05 | End: 2022-10-10 | Stop reason: SDUPTHER

## 2022-09-05 NOTE — TELEPHONE ENCOUNTER
Medication Refill Request     Name  gabapentin (Neurontin)   Dose/Frequency   300 mg capsule Take 1 capsule (300 mg total) by mouth daily at bedtime       Quantity 30  Verified pharmacy   [x]  Verified ordering Provider   [x]  Does patient have enough for the next 3 days?  Yes [] No [x]

## 2022-09-05 NOTE — TELEPHONE ENCOUNTER
Pt  Is going to run out on Wednesday and is going on a trip and will not be back until Saturday  Can you pls  Give him an emergency of 4 pills

## 2022-10-10 DIAGNOSIS — G62.9 NEUROPATHY: ICD-10-CM

## 2022-10-10 DIAGNOSIS — R20.2 PARESTHESIA: ICD-10-CM

## 2022-10-10 RX ORDER — GABAPENTIN 300 MG/1
300 CAPSULE ORAL
Qty: 30 CAPSULE | Refills: 0 | Status: SHIPPED | OUTPATIENT
Start: 2022-10-10

## 2022-11-09 DIAGNOSIS — R20.2 PARESTHESIA: ICD-10-CM

## 2022-11-09 DIAGNOSIS — G62.9 NEUROPATHY: ICD-10-CM

## 2022-11-11 RX ORDER — GABAPENTIN 300 MG/1
300 CAPSULE ORAL
Qty: 30 CAPSULE | Refills: 0 | Status: SHIPPED | OUTPATIENT
Start: 2022-11-11

## 2022-12-08 ENCOUNTER — OFFICE VISIT (OUTPATIENT)
Dept: INTERNAL MEDICINE CLINIC | Facility: CLINIC | Age: 37
End: 2022-12-08

## 2022-12-08 VITALS
BODY MASS INDEX: 25.15 KG/M2 | HEIGHT: 74 IN | OXYGEN SATURATION: 98 % | TEMPERATURE: 98.2 F | HEART RATE: 90 BPM | DIASTOLIC BLOOD PRESSURE: 68 MMHG | RESPIRATION RATE: 20 BRPM | WEIGHT: 196 LBS | SYSTOLIC BLOOD PRESSURE: 116 MMHG

## 2022-12-08 DIAGNOSIS — G62.9 NEUROPATHY: ICD-10-CM

## 2022-12-08 DIAGNOSIS — R20.2 PARESTHESIA: ICD-10-CM

## 2022-12-08 DIAGNOSIS — F41.1 GENERALIZED ANXIETY DISORDER: Primary | ICD-10-CM

## 2022-12-08 RX ORDER — AMITRIPTYLINE HYDROCHLORIDE 10 MG/1
10 TABLET, FILM COATED ORAL
Qty: 30 TABLET | Refills: 0 | Status: SHIPPED | OUTPATIENT
Start: 2022-12-08

## 2022-12-08 RX ORDER — GABAPENTIN 100 MG/1
CAPSULE ORAL
Qty: 15 CAPSULE | Refills: 0 | Status: SHIPPED | OUTPATIENT
Start: 2022-12-08 | End: 2022-12-18

## 2022-12-08 NOTE — PROGRESS NOTES
[facility]    NAME: Mehul Huerta  AGE: 40 y o  SEX: male  : 1985     DATE: 2022     Assessment and Plan:     Problem List Items Addressed This Visit        Nervous and Auditory    Neuropathy     Patient does not believe he is getting any relief of symptoms from Neurontin they would like to titrate off Neurontin  Discussed other treatment options  We will trial amitriptyline  Start 10 mg at bedtime  Patient made aware of the side effects  May help with patient's anxiety and depression  Patient is also working with behavioral health therapist that he sees every other week regarding his anxiety  Encouraged him to practice meditation and relaxation techniques as he has noticed that he is "clenching down, which creates discomfort" states that he cannot relax the symptoms improve         Relevant Medications    amitriptyline (ELAVIL) 10 mg tablet    gabapentin (Neurontin) 100 mg capsule       Other    Generalized anxiety disorder - Primary     Patient is on BuSpar 10 mg twice daily  Patient states he believes it helps some  He has been on SSRIs in the past however does not like the side effects  He reports he sees a behavioral therapist every other week who is helping him with his anxiety  He does admit his anxiety could be improved  He believes it is contributing to the pain that he is experiencing in his perineum  Discussed other medications such as Paxil, Cymbalta, Wellbutrin however he is concerned about the sexual side effects related to the SSRI of SNRIs  He states he was on Wellbutrin in the past and believes he had a side effect that he did not like, but was unable to recall  Trial of amitriptyline 10 mg at bedtime  We will titrate up if improvement is noticed  Patient counseled on potential side effects  Also counseled if he develops any thoughts of SI or increased depression he is to call 911 or the crisis hotline    Patient verbalizes understanding  He will message me in 1 week to let me know how he is doing with the amitriptyline  We will follow-up in 4 weeks  Relevant Medications    amitriptyline (ELAVIL) 10 mg tablet    Paresthesia    Relevant Medications    gabapentin (Neurontin) 100 mg capsule           Return in about 4 weeks (around 1/5/2023) for Recheck  Chief Complaint:     Chief Complaint   Patient presents with   • Medication Problem     Wants to change a med        History of Present Illness:     Yonatan Arizmendi presents to the office because he would like to wean off the gabapentin  He states that in the past he had been on gabapentin and stopped abruptly with side effects  He does not believe that the gabapentin is helping his symptoms of neuropathy into his pelvis and penis  He admits that he has noticed that he is often "clenching down" in that region and if he can relax symptoms do improve  He states he has been working with his therapist on relaxation techniques  He does have a history of anxiety  He is on BuSpar 10 mg twice daily  He states that he has been on SSRIs in the past however did not like the side effects  Review of Systems:     Review of Systems   Constitutional: Negative  HENT: Negative  Respiratory: Negative  Cardiovascular: Negative  Gastrointestinal: Negative  Genitourinary: Positive for penile pain  Musculoskeletal: Positive for back pain  Skin: Negative  Psychiatric/Behavioral: Positive for sleep disturbance  Negative for suicidal ideas  The patient is nervous/anxious           Problem List:     Patient Active Problem List   Diagnosis   • Celiac disease   • Intrinsic eczema   • Generalized anxiety disorder   • Dysuria   • Penile irritation   • Depression, recurrent (HCC)   • Paresthesia   • Neuropathy   • Lumbar radiculopathy   • Pelvic pain        Objective:     /68 (BP Location: Left arm, Patient Position: Sitting, Cuff Size: Large)   Pulse 90   Temp 98 2 °F (36 8 °C) (Tympanic)   Resp 20   Ht 6' 2" (1 88 m)   Wt 88 9 kg (196 lb)   SpO2 98%   BMI 25 16 kg/m²     Physical Exam  Constitutional:       General: He is not in acute distress  Appearance: He is obese  HENT:      Head: Normocephalic and atraumatic  Right Ear: External ear normal       Left Ear: External ear normal       Nose: Nose normal       Mouth/Throat:      Pharynx: Oropharynx is clear  Eyes:      Conjunctiva/sclera: Conjunctivae normal    Cardiovascular:      Rate and Rhythm: Normal rate and regular rhythm  Pulses: Normal pulses  Heart sounds: Normal heart sounds  Pulmonary:      Effort: Pulmonary effort is normal  No respiratory distress  Breath sounds: Normal breath sounds  Abdominal:      Palpations: Abdomen is soft  Musculoskeletal:         General: Normal range of motion  Cervical back: Neck supple  Skin:     General: Skin is warm and dry  Capillary Refill: Capillary refill takes less than 2 seconds  Neurological:      Mental Status: He is alert and oriented to person, place, and time  Psychiatric:         Mood and Affect: Mood normal          Behavior: Behavior normal          Thought Content: Thought content normal          Judgment: Judgment normal          I spent 25 minutes with this patient      [address] [place] [address]  [facility]

## 2022-12-08 NOTE — ASSESSMENT & PLAN NOTE
Patient does not believe he is getting any relief of symptoms from Neurontin they would like to titrate off Neurontin  Discussed other treatment options  We will trial amitriptyline  Start 10 mg at bedtime  Patient made aware of the side effects  May help with patient's anxiety and depression  Patient is also working with behavioral health therapist that he sees every other week regarding his anxiety    Encouraged him to practice meditation and relaxation techniques as he has noticed that he is "clenching down, which creates discomfort" states that he cannot relax the symptoms improve

## 2022-12-08 NOTE — ASSESSMENT & PLAN NOTE
Patient is on BuSpar 10 mg twice daily  Patient states he believes it helps some  He has been on SSRIs in the past however does not like the side effects  He reports he sees a behavioral therapist every other week who is helping him with his anxiety  He does admit his anxiety could be improved  He believes it is contributing to the pain that he is experiencing in his perineum  Discussed other medications such as Paxil, Cymbalta, Wellbutrin however he is concerned about the sexual side effects related to the SSRI of SNRIs  He states he was on Wellbutrin in the past and believes he had a side effect that he did not like, but was unable to recall  Trial of amitriptyline 10 mg at bedtime  We will titrate up if improvement is noticed  Patient counseled on potential side effects  Also counseled if he develops any thoughts of SI or increased depression he is to call 911 or the crisis hotline  Patient verbalizes understanding  He will message me in 1 week to let me know how he is doing with the amitriptyline  We will follow-up in 4 weeks

## 2022-12-08 NOTE — PATIENT INSTRUCTIONS
Taper off of gabapentin decrease to 200mg for 5 nights then 100mg for 5 nighta  Start amitriptyline 10mg at bedtime  [employer] Timer kalina for mediation and relaxation    Relaxation and Meditation   WHAT YOU NEED TO KNOW:   Relaxation and meditation can help decrease pain, stress, and anxiety  Relaxation and meditation also help regulate your breathing and decrease your blood pressure and heartbeat  DISCHARGE INSTRUCTIONS:   Types of relaxation:   Slow, deep breathing  can help relax your body and mind  Deep breathing can be done at any time  Progressive muscle relaxation  decreases tense muscles  With this therapy, you relax certain muscle groups until your entire body is relaxed  Start at one end of your body and move to the other end, such as from your feet to your head  Autogenic training, or self-control relaxation , helps increase the blood flow to your limbs and helps you sleep  With this therapy, you try to replace painful or uncomfortable thoughts and feelings with pleasant ones  Guided imagery  uses your imagination to help you feel peaceful and calm  You try to see, hear, smell, and taste things that you picture in your mind  For example, you might imagine lying on a beach, feeling the sand, and hearing the waves  Distraction  uses activities you enjoy to help you take your mind off of pain, stress, or anxiety  Distraction may include, listening to music, painting, reading a book, or exercise  Types of meditation:  Meditation is a mind exercise that helps to relax your body and free your mind of worry  You sit quietly in a comfortable position, close your eyes, and relax your muscles  Your thoughts are relaxed while your body stays alert  Meditation can be done alone or with other people  Mantra meditation  is when you think or speak a certain word or phrase over and over  The word or phrase often has a smooth sound  The mantra is used as a way to help you focus   The sound is believed to make vibrations that have different effects on people  Mindfulness meditation  is when you focus on what is happening in your life at that point in time  You become aware of your thoughts and feelings in the present without making any judgment  You learn not to worry about your past and future  © Copyright [employer] 2022 Information is for End User's use only and may not be sold, redistributed or otherwise used for commercial purposes  All illustrations and images included in CareNotes® are the copyrighted property of A D A M , Inc  or Rona Lee   The above information is an  only  It is not intended as medical advice for individual conditions or treatments  Talk to your doctor, nurse or pharmacist before following any medical regimen to see if it is safe and effective for you  Paresthesia   AMBULATORY CARE:   Paresthesia  is numbness, tingling, or burning  It can happen in any part of your body, but usually occurs in your legs, feet, arms, or hands  Common signs and symptoms:   No feeling in the affected area    A feeling of pins and needles    An electric shock feeling    Heaviness    Trouble moving the affected area    A feeling that something is crawling under your skin    A feeling of burning or of cold in the affected area    Seek care immediately if:   You have severe pain along with numbness and tingling  Your legs suddenly become cold  You have trouble moving your legs, and they ache  You have increased weakness in a part of your body  You have uncontrolled movements  Contact your healthcare provider if:   Your symptoms do not improve  You have symptoms in more than one part of your body  You have questions or concerns about your condition or care  Treatment  will depend on what is causing your paresthesia  You may need to increase the amount of vitamin B in your blood   Your healthcare provider may change or stop a medicine you are taking that is causing your symptoms  Permanent paresthesia may be helped with nerve medicine  If you have diabetes, your healthcare provider or diabetes specialist can help you control your blood sugar levels  Your provider may recommend a splint or surgery if you have paresthesia caused by carpal tunnel syndrome  Manage paresthesia:   Protect the area from injury  You may injure or burn yourself if you lose feeling in the area  Be careful when you touch anything that could be hot  Wear sturdy shoes to protect your feet  Ask about other ways to protect yourself  Go to physical or occupational therapy if directed  Your provider may recommend therapy if you have a condition such as carpal tunnel syndrome  A physical therapist can teach you exercises to help strengthen the area or increase your ability to move it  An occupational therapist can help you find new ways to do your daily activities  Manage health conditions that can cause paresthesia  Work with your diabetes specialist if you have uncontrolled diabetes  A dietitian or your healthcare provider can help you create a meal plan if you have low vitamin B levels  Your provider can help you manage your health if you have multiple sclerosis or you had a stroke  It is important to manage health conditions to stop paresthesia or prevent it from getting worse  Follow up with your healthcare provider or neurologist as directed: Your healthcare provider may refer you to a specialist  Write down your questions so you remember to ask them during your visits  © Copyright [employer] 2022 Information is for End User's use only and may not be sold, redistributed or otherwise used for commercial purposes  All illustrations and images included in CareNotes® are the copyrighted property of A D A M , Inc  or Rona Lee   The above information is an  only  It is not intended as medical advice for individual conditions or treatments   Talk to your doctor, nurse or pharmacist before following any medical regimen to see if it is safe and effective for you

## 2023-01-06 ENCOUNTER — OFFICE VISIT (OUTPATIENT)
Dept: INTERNAL MEDICINE CLINIC | Facility: CLINIC | Age: 38
End: 2023-01-06

## 2023-01-06 VITALS
HEIGHT: 74 IN | SYSTOLIC BLOOD PRESSURE: 132 MMHG | DIASTOLIC BLOOD PRESSURE: 86 MMHG | WEIGHT: 199.2 LBS | HEART RATE: 77 BPM | RESPIRATION RATE: 16 BRPM | OXYGEN SATURATION: 99 % | TEMPERATURE: 98.1 F | BODY MASS INDEX: 25.57 KG/M2

## 2023-01-06 DIAGNOSIS — G89.29 PERINEAL PAIN IN MALE, CHRONIC: ICD-10-CM

## 2023-01-06 DIAGNOSIS — R10.2 PERINEAL PAIN IN MALE, CHRONIC: ICD-10-CM

## 2023-01-06 DIAGNOSIS — F41.1 GENERALIZED ANXIETY DISORDER: Primary | ICD-10-CM

## 2023-01-06 DIAGNOSIS — Z13.220 ENCOUNTER FOR LIPID SCREENING FOR CARDIOVASCULAR DISEASE: ICD-10-CM

## 2023-01-06 DIAGNOSIS — E55.9 VITAMIN D DEFICIENCY: ICD-10-CM

## 2023-01-06 DIAGNOSIS — Z13.6 ENCOUNTER FOR LIPID SCREENING FOR CARDIOVASCULAR DISEASE: ICD-10-CM

## 2023-01-06 DIAGNOSIS — G62.9 NEUROPATHY: ICD-10-CM

## 2023-01-06 DIAGNOSIS — R58 BLEEDING: ICD-10-CM

## 2023-01-06 DIAGNOSIS — Z13.29 SCREENING FOR THYROID DISORDER: ICD-10-CM

## 2023-01-06 DIAGNOSIS — R30.0 DYSURIA: ICD-10-CM

## 2023-01-06 RX ORDER — AMITRIPTYLINE HYDROCHLORIDE 25 MG/1
25 TABLET, FILM COATED ORAL
Qty: 30 TABLET | Refills: 1 | Status: SHIPPED | OUTPATIENT
Start: 2023-01-06

## 2023-01-06 NOTE — PATIENT INSTRUCTIONS
Increase amitriptyline  to 25mg at bedtime    Try acupuncture,  continue gym  Follow up with pain management

## 2023-01-06 NOTE — PROGRESS NOTES
[facility]    NAME: Tuyet Herrera  AGE: 40 y o  SEX: male  : 1985     DATE: 2023     Assessment and Plan:     Problem List Items Addressed This Visit        Nervous and Auditory    Neuropathy     We will increase Elavil to 25 mg at bedtime  Referral to pain management for second opinion by another provider  Relevant Medications    amitriptyline (ELAVIL) 25 mg tablet    Other Relevant Orders    CBC and differential    Comprehensive metabolic panel    Ambulatory Referral to Pain Management       Other    Generalized anxiety disorder - Primary     Continues to see behavioral health therapist   Continue BuSpar 10 mg twice daily  Elavil 25 mg at bedtime  Relevant Medications    amitriptyline (ELAVIL) 25 mg tablet    Other Relevant Orders    TSH, 3rd generation with Free T4 reflex    Dysuria    Relevant Orders    UA w Reflex to Microscopic w Reflex to Culture -Lab Collect   Other Visit Diagnoses     Vitamin D deficiency        Relevant Orders    Vitamin D 25 hydroxy    Bleeding        He is concerned about increased length of bleeding for recent cuts/wounds  Relevant Orders    Protime (PT) and Partial Thromboplastin Time (PTT)    Screening for thyroid disorder        Relevant Orders    TSH, 3rd generation with Free T4 reflex    Encounter for lipid screening for cardiovascular disease        Relevant Orders    Lipid panel    Perineal pain in male, chronic        Relevant Orders    Ambulatory Referral to Pain Management              Return in 4 weeks (on 2/3/2023) for Recheck  Chief Complaint:     Chief Complaint   Patient presents with   • Follow-up     4 week         History of Present Illness:     Aly Shankar presents the office today for follow-up appointment and review of recent medication changes  Review of Systems:     Review of Systems   Constitutional: Negative  HENT: Negative  Cardiovascular: Negative      Genitourinary: Positive for penile pain  Musculoskeletal: Positive for myalgias  Skin: Negative  Hematological: Bruises/bleeds easily  Psychiatric/Behavioral: The patient is nervous/anxious  Problem List:     Patient Active Problem List   Diagnosis   • Celiac disease   • Intrinsic eczema   • Generalized anxiety disorder   • Dysuria   • Penile irritation   • Depression, recurrent (HCC)   • Paresthesia   • Neuropathy   • Lumbar radiculopathy   • Pelvic pain        Objective:     /86 (BP Location: Left arm, Patient Position: Sitting, Cuff Size: Standard)   Pulse 77   Temp 98 1 °F (36 7 °C) (Tympanic)   Resp 16   Ht 6' 2" (1 88 m)   Wt 90 4 kg (199 lb 3 2 oz)   SpO2 99%   BMI 25 58 kg/m²     Physical Exam  Constitutional:       General: He is not in acute distress  Appearance: Normal appearance  He is normal weight  HENT:      Head: Normocephalic and atraumatic  Right Ear: External ear normal       Left Ear: External ear normal       Nose: Nose normal       Mouth/Throat:      Pharynx: Oropharynx is clear  Eyes:      Conjunctiva/sclera: Conjunctivae normal    Cardiovascular:      Rate and Rhythm: Normal rate and regular rhythm  Pulses: Normal pulses  Heart sounds: Normal heart sounds  Pulmonary:      Effort: Pulmonary effort is normal  No respiratory distress  Breath sounds: Normal breath sounds  Abdominal:      Palpations: Abdomen is soft  Musculoskeletal:         General: Normal range of motion  Cervical back: Neck supple  Skin:     General: Skin is warm and dry  Capillary Refill: Capillary refill takes less than 2 seconds  Neurological:      Mental Status: He is alert and oriented to person, place, and time  Psychiatric:         Mood and Affect: Mood normal          Behavior: Behavior normal          Thought Content: Thought content normal          Judgment: Judgment normal          I spent 15 minutes with this patient      Stiven Kan [address] [facility]

## 2023-01-09 ENCOUNTER — OFFICE VISIT (OUTPATIENT)
Dept: PAIN MEDICINE | Facility: CLINIC | Age: 38
End: 2023-01-09

## 2023-01-09 VITALS
WEIGHT: 200.4 LBS | DIASTOLIC BLOOD PRESSURE: 78 MMHG | BODY MASS INDEX: 25.73 KG/M2 | SYSTOLIC BLOOD PRESSURE: 128 MMHG | HEART RATE: 71 BPM

## 2023-01-09 DIAGNOSIS — M54.42 CHRONIC BILATERAL LOW BACK PAIN WITH BILATERAL SCIATICA: ICD-10-CM

## 2023-01-09 DIAGNOSIS — M54.16 LUMBAR RADICULOPATHY: ICD-10-CM

## 2023-01-09 DIAGNOSIS — M51.26 LUMBAR DISC HERNIATION: ICD-10-CM

## 2023-01-09 DIAGNOSIS — N48.89 PENILE PAIN: ICD-10-CM

## 2023-01-09 DIAGNOSIS — G89.4 CHRONIC PAIN SYNDROME: Primary | ICD-10-CM

## 2023-01-09 DIAGNOSIS — M54.41 CHRONIC BILATERAL LOW BACK PAIN WITH BILATERAL SCIATICA: ICD-10-CM

## 2023-01-09 DIAGNOSIS — R10.2 PERINEAL PAIN: ICD-10-CM

## 2023-01-09 DIAGNOSIS — G62.9 NEUROPATHY: ICD-10-CM

## 2023-01-09 DIAGNOSIS — G89.29 CHRONIC BILATERAL LOW BACK PAIN WITH BILATERAL SCIATICA: ICD-10-CM

## 2023-01-09 NOTE — ASSESSMENT & PLAN NOTE
Continues to see behavioral health therapist   Continue BuSpar 10 mg twice daily  Elavil 25 mg at bedtime

## 2023-01-09 NOTE — PROGRESS NOTES
Pain Medicine Follow-Up Note    Assessment:  1  Chronic pain syndrome    2  Chronic bilateral low back pain with bilateral sciatica    3  Neuropathy    4  Lumbar disc herniation    5  Lumbar radiculopathy    6  Perineal pain    7  Penile pain        Plan:  Orders Placed This Encounter   Procedures   • MRI pelvis sacrum,coccyx, si jts wo contrast     Standing Status:   Future     Standing Expiration Date:   1/9/2027     Scheduling Instructions: There is no preparation for this test  Please leave your jewelry and valuables at home, wedding rings are the exception  All patients will be required to change into a hospital gown and pants  Street clothes are not permitted in the MRI  Magnetic nail polish must be removed prior to arrival for your test  Please bring your insurance cards, a form of photo ID and a list of your medications with you  Arrive 15 minutes prior to your appointment time in order to register  Please bring any prior CT or MRI studies of this area that were not performed at a [facility]  To schedule this appointment, please contact Central Scheduling at [phone]  Prior to your appointment, please make sure you complete the MRI Screening Form when you e-Check in for your appointment  This will be available starting 7 days before your appointment in [address]  You may receive an e-mail with an activation code if you do not have a [employer] account  If you do not have access to a device, we will complete your screening at your appointment  Order Specific Question:   What is the patient's sedation requirement? Answer:   No Sedation     Order Specific Question:   Release to patient through [employer]     Answer:   Immediate     Order Specific Question:   Is order priority selected as STAT? Answer:   No     Order Specific Question:   Reason for Exam (FREE TEXT)     Answer:   perineal pain and penile pain  assessing for sacral nerve comression         No orders of the defined types were placed in this encounter  My impressions and treatment recommendations were discussed in detail with the patient who verbalized understanding and had no further questions  This is a 60-year-old male who returns her office  L4-5 lumbar epidural steroid injection in June 2022 with no relief  He continues to have low back pain with radiation down the bilateral lower extremities, right greater than left  However, he reports that he has learned to cope with tis issueh  His more pressing issue continues to be penile pain, hypersensitive, and dysesthesia in the tip of the penis  He has done pelvic floor treatment with minimal improvement  He is currently on Elavil 25 mg at bedtime with full improvement  Was previously on gabapentin which was helping, however was having notable grogginess with the medication  I did discuss that once Elavil is fully titrated, that we could consider adding Lyrica to his regimen and his neuropathic symptoms  He will update us in 1 to 2 weeks regarding his response to Elavil  We will also order MRI of the pelvis and sacrum to rule out any other potential sources of his symptoms  Differential diagnosis for his penile pain includes sacral radiculopathy of S2/S3, pudendal neuralgia, or urologic cause  His EMG lower extremity showed chronic bilateral L5-S1 radiculopathy, however I explained to the patient that this is not responsible for his symptoms  He may benefit from evaluation by pelvic pain specialist which was provided to him  He may be candidate for pudendal nerve block  [place] Prescription Drug Monitoring Program report was reviewed and was appropriate       Complete risks and benefits including bleeding, infection, tissue reaction, nerve injury and allergic reaction were discussed  The approach was demonstrated using models and literature was provided  Verbal and written consent was obtained        Discharge instructions were provided  I personally saw and examined the patient and I agree with the above discussed plan of care  History of Present Illness:    Cm Connor is a 40 y o  male who presents to [facility] for interval re-evaluation of the above stated pain complaints  The patient has a past medical and chronic pain history as outlined in the assessment section  He was last seen on 06/01/22 for lumbar epidural steroid injection with Dr Fiordaliza Everett  No relief  He again is complaining of chronic low back pain with bilateral sciatica  Pelvic floor therapy has not been helpful  Still continues to have pain at the head of the penis  Has been seeing neurology for this  There is possible consideration of cystoscopy for further work-up  His EMG of the bilateral extremities showed bilateral L5-S1 radiculopathy  No evidence of lumbosacral plexopathy  Today he is complaining of pain at the tip of the penis and hypersensitvity and dysesthesia there as well  Previously he was having dysuria but reports with urological treatment and pelvic floor therapy this has improved  The other issue is bilateral sciatica, right greater than left  Other than as stated above, the patient denies any interval changes in medications, medical condition, mental condition, symptoms, or allergies since the last office visit  Review of Systems:    Review of Systems      Past Medical History:   Diagnosis Date   • Allergic    • Anxiety    • Back pain    • Bloody ejaculation    • Celiac disease    • Depression        History reviewed  No pertinent surgical history      Family History   Problem Relation Age of Onset   • Diabetes Maternal Grandmother    • Heart disease Paternal Grandfather    • No Known Problems Mother    • No Known Problems Father        Social History     Occupational History   • Not on file   Tobacco Use   • Smoking status: Never   • Smokeless tobacco: Never   Vaping Use   • Vaping Use: Never used   Substance and Sexual Activity   • Alcohol use: Yes     Alcohol/week: 1 0 standard drink     Types: 1 Glasses of wine per week   • Drug use: Never   • Sexual activity: Yes     Partners: Female         Current Outpatient Medications:   •  amitriptyline (ELAVIL) 25 mg tablet, Take 1 tablet (25 mg total) by mouth daily at bedtime, Disp: 30 tablet, Rfl: 1  •  busPIRone (BUSPAR) 10 mg tablet, Take 1 tablet (10 mg total) by mouth 2 (two) times a day, Disp: 180 tablet, Rfl: 3  •  cetirizine (ZyrTEC) 10 mg tablet, Take 10 mg by mouth daily, Disp: , Rfl:   •  magnesium 30 MG tablet, Take 30 mg by mouth 2 (two) times a day, Disp: , Rfl:   •  multivitamin (THERAGRAN) TABS, Take 1 tablet by mouth daily, Disp: , Rfl:   •  QUERCETIN PO, Take 2 capsules by mouth in the morning 800 mg, Disp: , Rfl:     Allergies   Allergen Reactions   • Bactrim [Sulfamethoxazole-Trimethoprim] Hives   • Strawberry Extract - Food Allergy Other (See Comments)     throat tightness       Physical Exam:    /78 (BP Location: Left arm, Patient Position: Sitting, Cuff Size: Standard)   Pulse 71   Wt 90 9 kg (200 lb 6 4 oz)   BMI 25 73 kg/m²     Constitutional:normal, well developed, well nourished, alert, in no distress and non-toxic and no overt pain behavior    Eyes:anicteric  HEENT:grossly intact  Neck:supple, symmetric, trachea midline and no masses   Pulmonary:even and unlabored  Cardiovascular:No edema or pitting edema present  Skin:Normal without rashes or lesions and well hydrated  Psychiatric:Mood and affect appropriate  Neurologic:Cranial Nerves II-XII grossly intact  Musculoskeletal:normal      Imaging  MRI pelvis sacrum,coccyx, si jts wo contrast    (Results Pending)         Orders Placed This Encounter   Procedures   • MRI pelvis sacrum,coccyx, si jts wo contrast

## 2023-01-09 NOTE — ASSESSMENT & PLAN NOTE
We will increase Elavil to 25 mg at bedtime  Referral to pain management for second opinion by another provider

## 2023-01-18 ENCOUNTER — APPOINTMENT (OUTPATIENT)
Dept: LAB | Facility: CLINIC | Age: 38
End: 2023-01-18

## 2023-01-18 DIAGNOSIS — R58 BLEEDING: ICD-10-CM

## 2023-01-18 DIAGNOSIS — F41.1 GENERALIZED ANXIETY DISORDER: ICD-10-CM

## 2023-01-18 DIAGNOSIS — E55.9 VITAMIN D DEFICIENCY: ICD-10-CM

## 2023-01-18 DIAGNOSIS — R30.0 DYSURIA: ICD-10-CM

## 2023-01-18 DIAGNOSIS — Z13.29 SCREENING FOR THYROID DISORDER: ICD-10-CM

## 2023-01-18 DIAGNOSIS — Z13.6 ENCOUNTER FOR LIPID SCREENING FOR CARDIOVASCULAR DISEASE: ICD-10-CM

## 2023-01-18 DIAGNOSIS — G62.9 NEUROPATHY: ICD-10-CM

## 2023-01-18 DIAGNOSIS — Z13.220 ENCOUNTER FOR LIPID SCREENING FOR CARDIOVASCULAR DISEASE: ICD-10-CM

## 2023-01-18 LAB
25(OH)D3 SERPL-MCNC: 36.8 NG/ML (ref 30–100)
ALBUMIN SERPL BCP-MCNC: 4.1 G/DL (ref 3.5–5)
ALP SERPL-CCNC: 55 U/L (ref 46–116)
ALT SERPL W P-5'-P-CCNC: 53 U/L (ref 12–78)
ANION GAP SERPL CALCULATED.3IONS-SCNC: 5 MMOL/L (ref 4–13)
APTT PPP: 36 SECONDS (ref 23–37)
AST SERPL W P-5'-P-CCNC: 55 U/L (ref 5–45)
BACTERIA UR QL AUTO: NORMAL /HPF
BASOPHILS # BLD AUTO: 0.05 THOUSANDS/ÂΜL (ref 0–0.1)
BASOPHILS NFR BLD AUTO: 1 % (ref 0–1)
BILIRUB SERPL-MCNC: 1.32 MG/DL (ref 0.2–1)
BILIRUB UR QL STRIP: NEGATIVE
BUN SERPL-MCNC: 20 MG/DL (ref 5–25)
CALCIUM SERPL-MCNC: 9.2 MG/DL (ref 8.3–10.1)
CHLORIDE SERPL-SCNC: 103 MMOL/L (ref 96–108)
CHOLEST SERPL-MCNC: 179 MG/DL
CLARITY UR: CLEAR
CO2 SERPL-SCNC: 30 MMOL/L (ref 21–32)
COLOR UR: YELLOW
CREAT SERPL-MCNC: 1.09 MG/DL (ref 0.6–1.3)
EOSINOPHIL # BLD AUTO: 0.38 THOUSAND/ÂΜL (ref 0–0.61)
EOSINOPHIL NFR BLD AUTO: 5 % (ref 0–6)
ERYTHROCYTE [DISTWIDTH] IN BLOOD BY AUTOMATED COUNT: 12.3 % (ref 11.6–15.1)
GFR SERPL CREATININE-BSD FRML MDRD: 86 ML/MIN/1.73SQ M
GLUCOSE P FAST SERPL-MCNC: 89 MG/DL (ref 65–99)
GLUCOSE UR STRIP-MCNC: NEGATIVE MG/DL
HCT VFR BLD AUTO: 45.5 % (ref 36.5–49.3)
HDLC SERPL-MCNC: 68 MG/DL
HGB BLD-MCNC: 14.8 G/DL (ref 12–17)
HGB UR QL STRIP.AUTO: NEGATIVE
IMM GRANULOCYTES # BLD AUTO: 0.02 THOUSAND/UL (ref 0–0.2)
IMM GRANULOCYTES NFR BLD AUTO: 0 % (ref 0–2)
INR PPP: 1 (ref 0.84–1.19)
KETONES UR STRIP-MCNC: NEGATIVE MG/DL
LDLC SERPL CALC-MCNC: 95 MG/DL (ref 0–100)
LEUKOCYTE ESTERASE UR QL STRIP: NEGATIVE
LYMPHOCYTES # BLD AUTO: 2.27 THOUSANDS/ÂΜL (ref 0.6–4.47)
LYMPHOCYTES NFR BLD AUTO: 32 % (ref 14–44)
MCH RBC QN AUTO: 31.3 PG (ref 26.8–34.3)
MCHC RBC AUTO-ENTMCNC: 32.5 G/DL (ref 31.4–37.4)
MCV RBC AUTO: 96 FL (ref 82–98)
MONOCYTES # BLD AUTO: 0.85 THOUSAND/ÂΜL (ref 0.17–1.22)
MONOCYTES NFR BLD AUTO: 12 % (ref 4–12)
NEUTROPHILS # BLD AUTO: 3.58 THOUSANDS/ÂΜL (ref 1.85–7.62)
NEUTS SEG NFR BLD AUTO: 50 % (ref 43–75)
NITRITE UR QL STRIP: NEGATIVE
NON-SQ EPI CELLS URNS QL MICRO: NORMAL /HPF
NONHDLC SERPL-MCNC: 111 MG/DL
NRBC BLD AUTO-RTO: 0 /100 WBCS
PH UR STRIP.AUTO: 6.5 [PH]
PLATELET # BLD AUTO: 266 THOUSANDS/UL (ref 149–390)
PMV BLD AUTO: 9.6 FL (ref 8.9–12.7)
POTASSIUM SERPL-SCNC: 3.7 MMOL/L (ref 3.5–5.3)
PROT SERPL-MCNC: 7.2 G/DL (ref 6.4–8.4)
PROT UR STRIP-MCNC: ABNORMAL MG/DL
PROTHROMBIN TIME: 13.4 SECONDS (ref 11.6–14.5)
RBC # BLD AUTO: 4.73 MILLION/UL (ref 3.88–5.62)
RBC #/AREA URNS AUTO: NORMAL /HPF
SODIUM SERPL-SCNC: 138 MMOL/L (ref 135–147)
SP GR UR STRIP.AUTO: 1.02 (ref 1–1.03)
TRIGL SERPL-MCNC: 79 MG/DL
TSH SERPL DL<=0.05 MIU/L-ACNC: 1.07 UIU/ML (ref 0.45–4.5)
UROBILINOGEN UR STRIP-ACNC: <2 MG/DL
WBC # BLD AUTO: 7.15 THOUSAND/UL (ref 4.31–10.16)
WBC #/AREA URNS AUTO: NORMAL /HPF

## 2023-01-27 ENCOUNTER — HOSPITAL ENCOUNTER (OUTPATIENT)
Dept: MRI IMAGING | Facility: CLINIC | Age: 38
Discharge: HOME | End: 2023-01-27

## 2023-01-27 DIAGNOSIS — R10.2 PERINEAL PAIN: ICD-10-CM

## 2023-01-27 DIAGNOSIS — N48.89 PENILE PAIN: ICD-10-CM

## 2023-01-31 ENCOUNTER — TELEPHONE (OUTPATIENT)
Dept: RADIOLOGY | Facility: CLINIC | Age: 38
End: 2023-01-31

## 2023-01-31 NOTE — TELEPHONE ENCOUNTER
----- Message from Lexus Rogers MD sent at 1/31/2023 12:26 PM EST -----  MRI shows not show nerve impingement in the lumbar and sacral area  Normal study  I gave him information for a pelvic pain specialist and was wondering if he looked into this

## 2023-01-31 NOTE — TELEPHONE ENCOUNTER
FYI:  S/W pt and advised of results  Pt was waiting for results to contact Pelvic pain specialist  Will contact them now

## 2023-03-04 DIAGNOSIS — G62.9 NEUROPATHY: ICD-10-CM

## 2023-03-05 RX ORDER — AMITRIPTYLINE HYDROCHLORIDE 25 MG/1
25 TABLET, FILM COATED ORAL
Qty: 30 TABLET | Refills: 5 | Status: SHIPPED | OUTPATIENT
Start: 2023-03-05

## 2023-04-21 ENCOUNTER — APPOINTMENT (OUTPATIENT)
Age: 38
End: 2023-04-21

## 2023-04-21 DIAGNOSIS — R06.02 SHORTNESS OF BREATH: ICD-10-CM

## 2023-08-26 DIAGNOSIS — F41.1 GENERALIZED ANXIETY DISORDER: ICD-10-CM

## 2023-08-26 DIAGNOSIS — G62.9 NEUROPATHY: ICD-10-CM

## 2023-08-26 RX ORDER — AMITRIPTYLINE HYDROCHLORIDE 25 MG/1
25 TABLET, FILM COATED ORAL
Qty: 30 TABLET | Refills: 3 | Status: SHIPPED | OUTPATIENT
Start: 2023-08-26

## 2023-08-28 RX ORDER — BUSPIRONE HYDROCHLORIDE 10 MG/1
10 TABLET ORAL 2 TIMES DAILY
Qty: 180 TABLET | Refills: 0 | Status: SHIPPED | OUTPATIENT
Start: 2023-08-28

## 2023-10-20 ENCOUNTER — OFFICE VISIT (OUTPATIENT)
Age: 38
End: 2023-10-20

## 2023-10-20 VITALS
HEART RATE: 98 BPM | DIASTOLIC BLOOD PRESSURE: 74 MMHG | SYSTOLIC BLOOD PRESSURE: 112 MMHG | TEMPERATURE: 98 F | OXYGEN SATURATION: 98 % | WEIGHT: 198 LBS | BODY MASS INDEX: 25.41 KG/M2 | HEIGHT: 74 IN | RESPIRATION RATE: 18 BRPM

## 2023-10-20 DIAGNOSIS — F41.1 GENERALIZED ANXIETY DISORDER: Primary | ICD-10-CM

## 2023-10-20 DIAGNOSIS — F33.9 DEPRESSION, RECURRENT (HCC): ICD-10-CM

## 2023-10-20 DIAGNOSIS — Z00.00 ANNUAL PHYSICAL EXAM: Primary | ICD-10-CM

## 2023-10-20 DIAGNOSIS — M79.601 RIGHT ARM PAIN: ICD-10-CM

## 2023-10-20 DIAGNOSIS — L20.84 INTRINSIC ECZEMA: ICD-10-CM

## 2023-10-20 DIAGNOSIS — F41.1 GENERALIZED ANXIETY DISORDER: ICD-10-CM

## 2023-10-20 DIAGNOSIS — Z13.6 SCREENING FOR CARDIOVASCULAR CONDITION: ICD-10-CM

## 2023-10-20 DIAGNOSIS — R10.2 PELVIC PAIN: ICD-10-CM

## 2023-10-20 RX ORDER — BUSPIRONE HYDROCHLORIDE 10 MG/1
10 TABLET ORAL 3 TIMES DAILY
Qty: 180 TABLET | Refills: 1 | Status: SHIPPED | OUTPATIENT
Start: 2023-10-20

## 2023-10-20 NOTE — ASSESSMENT & PLAN NOTE
Pt states uses hydrocortisone and some eczema creams during flare-up. Wants to establish with derm, referral placed.

## 2023-10-20 NOTE — ASSESSMENT & PLAN NOTE
Pt states still having pelvic pain, tends to clench the pelvic pain when anxious. Pt had PT done and also followed with Urology, states was told follow up with cystoscopy and did not want to have that done, so didn't follow up. Pt states will follow with Urology. 208

## 2023-10-20 NOTE — PROGRESS NOTES
[address] [facility] PRIMARY [facility] [place]    NAME: Katie Wild  AGE: 41 y.o. SEX: male  : 1985     DATE: 10/20/2023     Assessment and Plan:     Problem List Items Addressed This Visit          Musculoskeletal and Integument    Intrinsic eczema     Pt states uses hydrocortisone and some eczema creams during flare-up. Stable at this time, however, wants to establish with derm, referral placed. Relevant Orders    Ambulatory Referral to Dermatology       Other    Generalized anxiety disorder     Pt does talk therapy once or twice a month. Continue with Buspar. Encouraged pt to also establish with psychiatrist. States used to be with one at [facility] however, no longer there, so will be looking for another one. Continue current regimen. Relevant Medications    busPIRone (BUSPAR) 10 mg tablet    Depression, recurrent (HCC)  Stable, on amitriptyline. Continue current regimen. Relevant Medications    busPIRone (BUSPAR) 10 mg tablet    Pelvic pain     Pt states still having pelvic pain, tends to clench the pelvic pain when anxious. Pt had pelvic PT done which helped slightly per pt and also followed with Urology, states was told follow up with cystoscopy and did not want to have that done, so didn't follow up. Pt states will follow with Urology. Other Visit Diagnoses       Annual physical exam    -  Primary    Right arm pain      Started 2 months ago, states it is in the lower arm. States feels like a shooting pain when he moves his hand a certain way, or bend the elbow. Pt states he has been getting better and no symptoms since he made appointment last week. Pinched nerve, muscle spasm/pain vs other nonspecific arm pain. Discussed with patient likely patient may have given the review of symptoms.   Pain is getting better, instructed patient to continue monitoring symptoms notify office if symptoms do not resolve we will within the next 4 weeks. Will consider EMG or other testing based on symptoms then and refer as needed. Immunizations and preventive care screenings were discussed with patient today. Appropriate education was printed on patient's after visit summary. Counseling:  Alcohol/drug use: discussed moderation in alcohol intake, the recommendations for healthy alcohol use, and avoidance of illicit drug use. Dental Health: discussed importance of regular tooth brushing, flossing, and dental visits. Injury prevention: discussed safety/seat belts, safety helmets, smoke detectors, carbon dioxide detectors, and smoking near bedding or upholstery. Sexual health: discussed sexually transmitted diseases, partner selection, use of condoms, avoidance of unintended pregnancy, and contraceptive alternatives. Exercise: the importance of regular exercise/physical activity was discussed. Recommend exercise 3-5 times per week for at least 30 minutes. BMI Counseling: Body mass index is 25.42 kg/m². The BMI is above normal. Nutrition recommendations include encouraging healthy choices of fruits and vegetables, decreasing fast food intake, limiting drinks that contain sugar, moderation in carbohydrate intake, reducing intake of saturated and trans fat and reducing intake of cholesterol. Exercise recommendations include exercising 3-5 times per week. No pharmacotherapy was ordered. Rationale for BMI follow-up plan is due to patient being overweight or obese. BMI Counseling: Body mass index is 25.42 kg/m². Follow-up plan was not completed due to patient refusing BMI follow-up plan. Return in about 6 months (around 4/20/2024) for Recheck. Chief Complaint:     Chief Complaint   Patient presents with   • Follow-up     Pain from pelvic pain and genital pain from previous. New pain on forearm.          History of Present Illness:     Adult Annual Physical   Patient here for a comprehensive physical exam. The patient reports problems - right forearm pain . States it is getting better, and has not had any pain since last week. Diet and Physical Activity  Diet/Nutrition: well balanced diet and consuming 3-5 servings of fruits/vegetables daily. Exercise: moderate cardiovascular exercise, 3-4 times a week on average, and 1 hr session . Depression Screening  PHQ-2/9 Depression Screening    Little interest or pleasure in doing things: 1 - several days  Feeling down, depressed, or hopeless: 1 - several days  Trouble falling or staying asleep, or sleeping too much: 2 - more than half the days  Feeling tired or having little energy: 1 - several days  Poor appetite or overeatin - not at all  Feeling bad about yourself - or that you are a failure or have let yourself or your family down: 1 - several days  Trouble concentrating on things, such as reading the newspaper or watching television: 3 - nearly every day  Moving or speaking so slowly that other people could have noticed. Or the opposite - being so fidgety or restless that you have been moving around a lot more than usual: 0 - not at all  Thoughts that you would be better off dead, or of hurting yourself in some way: 0 - not at all  PHQ-9 Score: 9   PHQ-9 Interpretation: Mild depression        General Health  Sleep: sleeps well and 6-7hrs . Hearing: normal - bilateral.  Vision: no vision problems. Dental: regular dental visits, brushes teeth twice daily, and flosses teeth occasionally.  Health  History of STDs?: no.    Advanced Care Planning  Do you have an advanced directive? no  Do you have a durable medical power of ? no     Review of Systems:     Review of Systems   Constitutional:  Negative for chills and fever. HENT:  Negative for ear pain and sore throat. Eyes:  Negative for pain and visual disturbance. Respiratory:  Negative for cough and shortness of breath. Cardiovascular:  Negative for chest pain and palpitations. Gastrointestinal:  Negative for abdominal pain and vomiting. Genitourinary:  Negative for dysuria and hematuria. Musculoskeletal:  Positive for arthralgias. Negative for back pain. Skin:  Negative for color change and rash. Neurological:  Negative for seizures and syncope. Psychiatric/Behavioral:  The patient is nervous/anxious. All other systems reviewed and are negative. Past Medical History:     Past Medical History:   Diagnosis Date   • Allergic    • Anxiety    • Back pain    • Bloody ejaculation    • Celiac disease    • Depression       Past Surgical History:     History reviewed. No pertinent surgical history. Social History:     Social History     Socioeconomic History   • Marital status: /Civil Union     Spouse name: None   • Number of children: None   • Years of education: None   • Highest education level: None   Occupational History   • None   Tobacco Use   • Smoking status: Never   • Smokeless tobacco: Never   Vaping Use   • Vaping Use: Never used   Substance and Sexual Activity   • Alcohol use:  Yes     Alcohol/week: 1.0 standard drink of alcohol     Types: 1 Glasses of wine per week   • Drug use: Never   • Sexual activity: Yes     Partners: Female   Other Topics Concern   • None   Social History Narrative   • None     Social Determinants of Health     Financial Resource Strain: Not on file   Food Insecurity: Not on file   Transportation Needs: Not on file   Physical Activity: Inactive (11/23/2021)    Exercise Vital Sign    • Days of Exercise per Week: 0 days    • Minutes of Exercise per Session: 0 min   Stress: Stress Concern Present (8/5/2022)    [facility]    • Feeling of Stress : Very much   Social Connections: Not on file   Intimate Partner Violence: Not on file   Housing Stability: Not on file      Family History:     Family History   Problem Relation Age of Onset   • Diabetes Maternal Grandmother    • Heart disease Paternal Grandfather    • No Known Problems Mother    • No Known Problems Father       Current Medications:     Current Outpatient Medications   Medication Sig Dispense Refill   • amitriptyline (ELAVIL) 25 mg tablet TAKE ONE TABLET BY MOUTH EVERY DAY AT BEDTIME 30 tablet 3   • busPIRone (BUSPAR) 10 mg tablet Take 1 tablet (10 mg total) by mouth 3 (three) times a day 180 tablet 1   • cetirizine (ZyrTEC) 10 mg tablet Take 10 mg by mouth daily     • multivitamin (THERAGRAN) TABS Take 1 tablet by mouth daily     • magnesium 30 MG tablet Take 30 mg by mouth 2 (two) times a day       No current facility-administered medications for this visit. Allergies: Allergies   Allergen Reactions   • Bactrim [Sulfamethoxazole-Trimethoprim] Hives   • Strawberry Extract - Food Allergy Other (See Comments)     throat tightness      Physical Exam:     /74 (BP Location: Left arm, Patient Position: Sitting, Cuff Size: Standard)   Pulse 98   Temp 98 °F (36.7 °C) (Tympanic)   Resp 18   Ht 6' 2" (1.88 m)   Wt 89.8 kg (198 lb)   SpO2 98%   BMI 25.42 kg/m²     Physical Exam  Vitals and nursing note reviewed. Constitutional:       General: He is not in acute distress. Appearance: He is well-developed. HENT:      Head: Normocephalic and atraumatic. Right Ear: Tympanic membrane normal.      Left Ear: Tympanic membrane normal.      Nose: Nose normal.      Mouth/Throat:      Mouth: Mucous membranes are moist.   Eyes:      Conjunctiva/sclera: Conjunctivae normal.   Cardiovascular:      Rate and Rhythm: Normal rate and regular rhythm. Heart sounds: Normal heart sounds. No murmur heard. Pulmonary:      Effort: Pulmonary effort is normal. No respiratory distress. Breath sounds: Normal breath sounds. Abdominal:      Palpations: Abdomen is soft. Tenderness: There is no abdominal tenderness. Musculoskeletal:         General: No swelling or tenderness. Normal range of motion.       Cervical back: Neck supple. Skin:     General: Skin is warm and dry. Capillary Refill: Capillary refill takes less than 2 seconds. Neurological:      Mental Status: He is alert and oriented to person, place, and time. Psychiatric:         Mood and Affect: Mood normal.         Behavior: Behavior normal.         Thought Content:  Thought content normal.          Vern Avendano

## 2023-10-20 NOTE — PATIENT INSTRUCTIONS
Wellness Visit for Adults   AMBULATORY CARE:   A wellness visit  is when you see your healthcare provider to get screened for health problems. Your healthcare provider will also give you advice on how to stay healthy. Write down your questions so you remember to ask them. Ask your healthcare provider how often you should have a wellness visit. What happens at a wellness visit:  Your healthcare provider will ask about your health, and your family history of health problems. This includes high blood pressure, heart disease, and cancer. He or she will ask if you have symptoms that concern you, if you smoke, and about your mood. You may also be asked about your intake of medicines, supplements, food, and alcohol. Any of the following may be done: Your weight  will be checked. Your height may also be checked so your body mass index (BMI) can be calculated. Your BMI shows if you are at a healthy weight. Your blood pressure  and heart rate will be checked. Your temperature may also be checked. Blood and urine tests  may be done. Blood tests may be done to check your cholesterol levels. Abnormal cholesterol levels increase your risk for heart disease and stroke. You may also need a blood or urine test to check for diabetes if you are at increased risk. Urine tests may be done to look for signs of an infection or kidney disease. A physical exam  includes checking your heartbeat and lungs with a stethoscope. Your healthcare provider may also check your skin to look for sun damage. Screening tests  may be recommended. A screening test is done to check for diseases that may not cause symptoms. The screening tests you may need depend on your age, gender, family history, and lifestyle habits. For example, colorectal screening may be recommended if you are 48years old or older. Screening tests you need if you are a woman:   A Pap smear  is used to screen for cervical cancer.  Pap smears are usually done every 3 to 5 years depending on your age. You may need them more often if you have had abnormal Pap smear test results in the past. Ask your healthcare provider how often you should have a Pap smear. A mammogram  is an x-ray of your breasts to screen for breast cancer. Experts recommend mammograms every 2 years starting at age 48 years. You may need a mammogram at age 52 years or younger if you have an increased risk for breast cancer. Talk to your healthcare provider about when you should start having mammograms and how often you need them. Vaccines you may need:   Get an influenza vaccine  every year. The influenza vaccine protects you from the flu. Several types of viruses cause the flu. The viruses change over time, so new vaccines are made each year. Get a tetanus-diphtheria (Td) booster vaccine  every 10 years. This vaccine protects you against tetanus and diphtheria. Tetanus is a severe infection that may cause painful muscle spasms and lockjaw. Diphtheria is a severe bacterial infection that causes a thick covering in the back of your mouth and throat. Get a human papillomavirus (HPV) vaccine  if you are female and aged 23 to 32 or male 23 to 24 and never received it. This vaccine protects you from HPV infection. HPV is the most common infection spread by sexual contact. HPV may also cause vaginal, penile, and anal cancers. Get a pneumococcal vaccine  if you are aged 72 years or older. The pneumococcal vaccine is an injection given to protect you from pneumococcal disease. Pneumococcal disease is an infection caused by pneumococcal bacteria. The infection may cause pneumonia, meningitis, or an ear infection. Get a shingles vaccine  if you are 60 or older, even if you have had shingles before. The shingles vaccine is an injection to protect you from the varicella-zoster virus. This is the same virus that causes chickenpox.  Shingles is a painful rash that develops in people who had chickenpox or have been exposed to the virus. How to eat healthy:  My Plate is a model for planning healthy meals. It shows the types and amounts of foods that should go on your plate. Fruits and vegetables make up about half of your plate, and grains and protein make up the other half. A serving of dairy is included on the side of your plate. The amount of calories and serving sizes you need depends on your age, gender, weight, and height. Examples of healthy foods are listed below:  Eat a variety of vegetables  such as dark green, red, and orange vegetables. You can also include canned vegetables low in sodium (salt) and frozen vegetables without added butter or sauces. Eat a variety of fresh fruits , canned fruit in 100% juice, frozen fruit, and dried fruit. Include whole grains. At least half of the grains you eat should be whole grains. Examples include whole-wheat bread, wheat pasta, brown rice, and whole-grain cereals such as oatmeal.    Eat a variety of protein foods such as seafood (fish and shellfish), lean meat, and poultry without skin (turkey and chicken). Examples of lean meats include pork leg, shoulder, or tenderloin, and beef round, sirloin, tenderloin, and extra lean ground beef. Other protein foods include eggs and egg substitutes, beans, peas, soy products, nuts, and seeds. Choose low-fat dairy products such as skim or 1% milk or low-fat yogurt, cheese, and cottage cheese. Limit unhealthy fats  such as butter, hard margarine, and shortening. Exercise:  Exercise at least 30 minutes per day on most days of the week. Some examples of exercise include walking, biking, dancing, and swimming. You can also fit in more physical activity by taking the stairs instead of the elevator or parking farther away from stores. Include muscle strengthening activities 2 days each week. Regular exercise provides many health benefits.  It helps you manage your weight, and decreases your risk for type 2 diabetes, heart disease, stroke, and high blood pressure. Exercise can also help improve your mood. Ask your healthcare provider about the best exercise plan for you. General health and safety guidelines:   Do not smoke. Nicotine and other chemicals in cigarettes and cigars can cause lung damage. Ask your healthcare provider for information if you currently smoke and need help to quit. E-cigarettes or smokeless tobacco still contain nicotine. Talk to your healthcare provider before you use these products. Limit alcohol. A drink of alcohol is 12 ounces of beer, 5 ounces of wine, or 1½ ounces of liquor. Lose weight, if needed. Being overweight increases your risk of certain health conditions. These include heart disease, high blood pressure, type 2 diabetes, and certain types of cancer. Protect your skin. Do not sunbathe or use tanning beds. Use sunscreen with a SPF 15 or higher. Apply sunscreen at least 15 minutes before you go outside. Reapply sunscreen every 2 hours. Wear protective clothing, hats, and sunglasses when you are outside. Drive safely. Always wear your seatbelt. Make sure everyone in your car wears a seatbelt. A seatbelt can save your life if you are in an accident. Do not use your cell phone when you are driving. This could distract you and cause an accident. Pull over if you need to make a call or send a text message. Practice safe sex. Use latex condoms if are sexually active and have more than one partner. Your healthcare provider may recommend screening tests for sexually transmitted infections (STIs). Wear helmets, lifejackets, and protective gear. Always wear a helmet when you ride a bike or motorcycle, go skiing, or play sports that could cause a head injury. Wear protective equipment when you play sports. Wear a lifejacket when you are on a boat or doing water sports.     © Copyright [facility] 2023 Information is for End User's use only and may not be sold, redistributed or otherwise used for commercial purposes. The above information is an  only. It is not intended as medical advice for individual conditions or treatments. Talk to your doctor, nurse or pharmacist before following any medical regimen to see if it is safe and effective for you.

## 2023-10-20 NOTE — ASSESSMENT & PLAN NOTE
Pt does talk therapy once or twice a month. Continue with Buspar. Encouraged pt to also stablish with psychiatrist. States used to be with one at [facility] however, no longer there, so will be looking.

## 2023-12-12 ENCOUNTER — OFFICE VISIT (OUTPATIENT)
Age: 38
End: 2023-12-12
Payer: COMMERCIAL

## 2023-12-12 VITALS
TEMPERATURE: 95.9 F | WEIGHT: 199.8 LBS | RESPIRATION RATE: 18 BRPM | DIASTOLIC BLOOD PRESSURE: 70 MMHG | SYSTOLIC BLOOD PRESSURE: 110 MMHG | BODY MASS INDEX: 25.65 KG/M2 | HEART RATE: 99 BPM | OXYGEN SATURATION: 100 %

## 2023-12-12 DIAGNOSIS — J01.10 ACUTE NON-RECURRENT FRONTAL SINUSITIS: Primary | ICD-10-CM

## 2023-12-12 PROCEDURE — 99213 OFFICE O/P EST LOW 20 MIN: CPT

## 2023-12-12 RX ORDER — PSEUDOEPHEDRINE HYDROCHLORIDE 60 MG/1
60 TABLET, FILM COATED ORAL EVERY 6 HOURS PRN
Qty: 30 TABLET | Refills: 0 | Status: SHIPPED | OUTPATIENT
Start: 2023-12-12 | End: 2023-12-22

## 2023-12-12 RX ORDER — AMOXICILLIN AND CLAVULANATE POTASSIUM 875; 125 MG/1; MG/1
1 TABLET, FILM COATED ORAL EVERY 12 HOURS SCHEDULED
Qty: 20 TABLET | Refills: 0 | Status: SHIPPED | OUTPATIENT
Start: 2023-12-12 | End: 2023-12-22

## 2023-12-12 NOTE — PROGRESS NOTES
Name: Meliza Torres      : 1985      MRN: [id number]  Encounter Provider: Kaylin Sierra  Encounter Date: 2023   Encounter department: [facility] PRIMARY CARE [place]    Assessment & Plan     1. Acute non-recurrent frontal sinusitis  -     pseudoephedrine (SUDAFED) 60 mg tablet; Take 1 tablet (60 mg total) by mouth every 6 (six) hours as needed for congestion for up to 10 days  -     amoxicillin-clavulanate (AUGMENTIN) 875-125 mg per tablet; Take 1 tablet by mouth every 12 (twelve) hours for 10 days    Symptoms present for 2 weeks with improvement and then worsening. Given instructions on nasal saline rinse, use flonase for local steroid decongestant, and do 10 day course of Augmentin. Sudafed as well for decongestion. Continue Mucinex. Drink plenty of fluids. Tylenol for headaches. Cough is not irritation so will avoid steroids for now. Cough may linger for another month, but should improve, especially if his PND improves. Reach out if not improving in two weeks or develops fevers or other worsening symptoms. Subjective      Sinus Problem  Associated symptoms include congestion, coughing, ear pain (blocked), headaches and a sore throat. Pertinent negatives include no chills, diaphoresis or shortness of breath. Pt has had cold symptoms for about 2 weeks. Was getting better, but then got worse again. Sinus pressure, congestion, mucus, sore throat with PND, ear fullness, and cough with minimal production of mucus. Using mucinex for cough and congestion. Denies fever, chills, night sweats, N/V/D. Review of Systems   Constitutional:  Negative for chills, diaphoresis, fatigue and fever. HENT:  Positive for congestion, ear pain (blocked), postnasal drip and sore throat. Negative for drooling, rhinorrhea and trouble swallowing. Eyes:  Negative for pain and redness. Respiratory:  Positive for cough. Negative for chest tightness and shortness of breath. Cardiovascular:  Negative for chest pain and palpitations. Gastrointestinal: Negative. Musculoskeletal:  Negative for gait problem and myalgias. Skin:  Negative for rash. Neurological:  Positive for headaches. Negative for syncope, facial asymmetry and light-headedness. All other systems reviewed and are negative. Current Outpatient Medications on File Prior to Visit   Medication Sig   • amitriptyline (ELAVIL) 25 mg tablet TAKE ONE TABLET BY MOUTH EVERY DAY AT BEDTIME   • busPIRone (BUSPAR) 10 mg tablet Take 1 tablet (10 mg total) by mouth 3 (three) times a day   • cetirizine (ZyrTEC) 10 mg tablet Take 10 mg by mouth daily   • magnesium 30 MG tablet Take 30 mg by mouth 2 (two) times a day   • multivitamin (THERAGRAN) TABS Take 1 tablet by mouth daily       Objective     /70 (BP Location: Right arm, Patient Position: Sitting, Cuff Size: Standard)   Pulse 99   Temp (!) 95.9 °F (35.5 °C) (Tympanic)   Resp 18   Wt 90.6 kg (199 lb 12.8 oz)   SpO2 100%   BMI 25.65 kg/m²     Physical Exam  Constitutional:       Appearance: Normal appearance. He is not toxic-appearing or diaphoretic. HENT:      Head: Normocephalic and atraumatic. Right Ear: Hearing, ear canal and external ear normal. A middle ear effusion is present. Tympanic membrane is not perforated, erythematous or retracted. Left Ear: Hearing, ear canal and external ear normal. A middle ear effusion is present. Tympanic membrane is not perforated, erythematous or retracted. Nose:      Right Sinus: Frontal sinus tenderness present. No maxillary sinus tenderness. Left Sinus: Frontal sinus tenderness present. No maxillary sinus tenderness. Mouth/Throat:      Lips: Pink. Mouth: Mucous membranes are moist.      Pharynx: Uvula midline. Posterior oropharyngeal erythema present. No pharyngeal swelling, oropharyngeal exudate or uvula swelling.    Eyes:      Conjunctiva/sclera: Conjunctivae normal.   Cardiovascular: Rate and Rhythm: Normal rate and regular rhythm. Heart sounds: Normal heart sounds. Pulmonary:      Effort: Pulmonary effort is normal. No respiratory distress. Breath sounds: Normal breath sounds. Abdominal:      General: Abdomen is flat. Skin:     General: Skin is warm and dry. Capillary Refill: Capillary refill takes less than 2 seconds. Coloration: Skin is not cyanotic. Neurological:      General: No focal deficit present. Mental Status: He is alert.    Psychiatric:         Mood and Affect: Mood normal.       Sharron Vizcaino PA-C

## 2023-12-28 DIAGNOSIS — G62.9 NEUROPATHY: ICD-10-CM

## 2023-12-29 RX ORDER — AMITRIPTYLINE HYDROCHLORIDE 25 MG/1
25 TABLET, FILM COATED ORAL
Qty: 30 TABLET | Refills: 0 | Status: SHIPPED | OUTPATIENT
Start: 2023-12-29

## 2024-01-26 DIAGNOSIS — G62.9 NEUROPATHY: ICD-10-CM

## 2024-01-27 RX ORDER — AMITRIPTYLINE HYDROCHLORIDE 25 MG/1
25 TABLET, FILM COATED ORAL
Qty: 30 TABLET | Refills: 3 | Status: SHIPPED | OUTPATIENT
Start: 2024-01-27

## 2024-03-21 ENCOUNTER — OFFICE VISIT (OUTPATIENT)
Age: 39
End: 2024-03-21
Payer: COMMERCIAL

## 2024-03-21 VITALS — HEIGHT: 74 IN | BODY MASS INDEX: 26.31 KG/M2 | TEMPERATURE: 98 F | WEIGHT: 205 LBS

## 2024-03-21 DIAGNOSIS — L30.0 NUMMULAR DERMATITIS: Primary | ICD-10-CM

## 2024-03-21 PROCEDURE — 99204 OFFICE O/P NEW MOD 45 MIN: CPT | Performed by: DERMATOLOGY

## 2024-03-21 RX ORDER — TRIAMCINOLONE ACETONIDE 1 MG/G
OINTMENT TOPICAL 2 TIMES DAILY
Qty: 30 G | Refills: 2 | Status: SHIPPED | OUTPATIENT
Start: 2024-03-21

## 2024-03-21 NOTE — PROGRESS NOTES
"[facility]atology [facility] Note     Patient Name: Spencer Hoover  Encounter Date: 03/21/2024     Have you been cared for by a [facility] Dermatologist in the last 3 years and, if so, which description applies to you?    NO.   I am considered a \"new\" patient and must complete all patient intake questions. I am MALE/not capable of bearing children.    REVIEW OF SYSTEMS:  Have you recently had or currently have any of the following? Recent fever or chills? No  Any non-healing wound? No   PAST MEDICAL HISTORY:  Have you personally ever had or currently have any of the following?  If \"YES,\" then please provide more detail. Skin cancer (such as Melanoma, Basal Cell Carcinoma, Squamous Cell Carcinoma?  No  Tuberculosis, HIV/AIDS, Hepatitis B or C: No  Radiation Treatment No   HISTORY OF IMMUNOSUPPRESSION:   Do you have a history of any of the following:  Systemic Immunosuppression such as Diabetes, Biologic or Immunotherapy, Chemotherapy, Organ Transplantation, Bone Marrow Transplantation?  No     Answering \"YES\" requires the addition of the dotphrase \"IMMUNOSUPPRESSED\" as the first diagnosis of the patient's visit.   FAMILY HISTORY:  Any \"first degree relatives\" (parent, brother, sister, or child) with the following?    Skin Cancer, Pancreatic or Other Cancer? No   PATIENT EXPERIENCE:    Do you want the Dermatologist to perform a COMPLETE skin exam today including a clinical examination under the \"bra and underwear\" areas?  No   If necessary, do we have your permission to call and leave a detailed message on your Preferred Phone number that includes your specific medical information?  Yes      Allergies   Allergen Reactions    Bactrim [Sulfamethoxazole-Trimethoprim] Hives    Strawberry Extract - Food Allergy Other (See Comments)     throat tightness      Current Outpatient Medications:     amitriptyline (ELAVIL) 25 mg tablet, TAKE ONE TABLET BY MOUTH AT BEDTIME, Disp: 30 tablet, Rfl: 3    busPIRone (BUSPAR) 10 mg " tablet, Take 1 tablet (10 mg total) by mouth 3 (three) times a day, Disp: 180 tablet, Rfl: 1    cetirizine (ZyrTEC) 10 mg tablet, Take 10 mg by mouth daily, Disp: , Rfl:     magnesium 30 MG tablet, Take 30 mg by mouth 2 (two) times a day, Disp: , Rfl:     multivitamin (THERAGRAN) TABS, Take 1 tablet by mouth daily, Disp: , Rfl:     pseudoephedrine (SUDAFED) 60 mg tablet, Take 1 tablet (60 mg total) by mouth every 6 (six) hours as needed for congestion for up to 10 days, Disp: 30 tablet, Rfl: 0          Whom besides the patient is providing clinical information about today's encounter?   NO ADDITIONAL HISTORIAN (patient alone provided history)    Physical Exam and Assessment/Plan by Diagnosis:      Chief Complaint   Patient presents with    New Patient Visit     Patient state dry skin hands and legs. Also back concerns.  Patient is using hydrocortisone cream on the areas no relief.       NUMMULAR ECZEMA    Physical Exam:  Anatomic Location Affected:  scattered lower legs   Morphological Description:  dry scaling patches   Pertinent Positives:  Pertinent Negatives:    Additional History of Present Condition:  present for years and exacerbation by cold weather and stress     Assessment and Plan:  Based on a thorough discussion of this condition and the management approach to it (including a comprehensive discussion of the known risks, side effects and potential benefits of treatment), the patient (family) agrees to implement the following specific plan:  Triamcinolone ointment  - Recommend sensitive skin care regimen  - detergent free of dyes and perfumes (example, free and clear). Try washing clothes with extra rinse cycle.  - Short lukewarm showers  - White dove bar soap  - Recommend moisturizing whole body with Creams multiple times a day (examples: Cetaphil, CeraVe. and Eucerin)  - avoid aerosols and fragrances in the home (candles, plug ins, perfume, air freshener, etc)      Numular eczema   Nummular dermatitis  (or eczema) is also known as discoid eczema (or dermatitis). It has two forms:  Exudative (‘wet’) nummular dermatitis   Dry nummular dermatitis    Exudative nummular dermatitis  The exudative variant starts acutely and may persist for weeks, months and rarely years. Although it may arise at any age, most subjects are over 50 years. In children, it is often thought to be a type of atopic dermatitis, but in adults it doesn't appear to relate to atopy. It is more common in males than females.  The initial plaque may appear at the site of trauma or infection. For example:  Thermal burn   Scabies infestation   Varicose vein surgery   Insect bite   Impetigo    Nummular dermatitis is sometimes due to drug allergy (e.g. to interferon alpha, intravenous immunoglobulins, etanercept) or systematised contact allergy, especially to nickel, gold and mercury.  The initial lesions are papules or vesicles, which form confluent plaques. The plaques may be crusted, weeping or blistered and are intensely itchy. Secondary infection with pustules, pain and spreading erythema is not uncommon. Older lesions may be dry and excoriated.  Clusters of round or oval plaques may be localised to lower legs, the backs of the hands or other sites. Nummular dermatitis may also generalise to affect scalp, face, trunk and limbs. Generally the eruption is scattered, but sometimes the plaques are distributed symmetrically. In-between skin appears normal.    Investigations  Skin swabs frequently culture abundant Staphylococcus aureus from exudative nummular dermatitis and sometimes from dry discoid dermatitis.  Skin scrapings for microscopy and fungal culture may be necessary to rule out tinea corporis.    Differential diagnosis  Discoid eczema is frequently confused with the following skin disorders:  Psoriasis (redder, scalier plaques, symmetrical distribution, typical psoriatic sites)   Tinea corporis (grouped lesions, scaly or pustular edge)   Contact  dermatitis (irregular shaped and sized lesions, contact factors)   Lichen simplex (lichenified plaques, may co-exist with nummular dermatitis)   Stasis dermatitis (lower legs, circumferential, hyperpigmentation, lipodermatosclerosis)    Management  Management of nummular dermatitis may involve the following:    Skin emollients  Emollients include bath oils, soap substitutes and moisturizing creams. They can be applied to the dermatitis as frequently as required to relieve itching, scaling and dryness. Emollients should also be used on the unaffected skin to reduce dryness. It may be necessary to try several different products to find one that suits. Many people find one or more of the following helpful: glycerine and cetomacrogol cream, white soft paraffin/liquid paraffin mixed, fatty cream, wool fat lotions.  Topical steroids   Topical steroids are anti-inflammatory creams or ointments available on prescription which may clear the dermatitis and reduce irritation. The stronger products are applied to the patches just once or twice daily for 2-4 weeks. They may be repeated from time to time depending on flare ups. Mild ones such as hydrocortisone are safe for daily use if necessary.  Antibiotics   Antibiotics (most often flucloxacillin) are often prescribed if the rash is blistered, sticky or crusted. Sometimes nummular eczema clears completely on oral antibiotics, only to recur when they are discontinued  Oral antihistamines   Antihistamine pills may reduce the itching, and are particularly helpful at night-time. They do not clear the dermatitis. Non-sedating antihistamines appear less useful for nummular eczema than first-generation antihistamines taken at night to help sleep.  Ultraviolet radiation (UV) treatment   Phototherapy several times weekly for 6-12 weeks can reduce the extent and severity of discoid eczema.  Steroid injections  Intralesional steroids are sometimes injected into one or two particularly  stubborn areas of discoid eczema. This treatment is unsuitable for multiple lesions.  Oral steroids (very rarely)  Systemic steroids are reserved for severe and extensive cases of discoid eczema. They are usually prescribed for a few weeks while continuing steroid creams and emollients on residual dermatitis.  Other oral treatments   Persistent and troublesome nummular eczema is occasionally treated with methotrexate, azathioprine or ciclosporin. These medicines have important risks and side effects and require careful monitoring by a specialist dermatologist. They may be more suitable in many cases than long-term steroids.    Nummular eczema can usually be controlled with the above measures, although it has a tendency to recur when the treatment has been stopped. In most patients, nummular eczema eventually clears up completely.           Scribe Attestation      I,:  Ameena Ramirez am acting as a scribe while in the presence of the attending physician.:       I,:  Kevin Villar MD personally performed the services described in this documentation    as scribed in my presence.:

## 2024-03-21 NOTE — PATIENT INSTRUCTIONS
NUMMULAR ECZEMA       Assessment and Plan:  Based on a thorough discussion of this condition and the management approach to it (including a comprehensive discussion of the known risks, side effects and potential benefits of treatment), the patient (family) agrees to implement the following specific plan:  Triamcinolone ointment  - Recommend sensitive skin care regimen  - detergent free of dyes and perfumes (example, free and clear). Try washing clothes with extra rinse cycle.  - Short lukewarm showers  - White dove bar soap  - Recommend moisturizing whole body with Creams multiple times a day (examples: Cetaphil, CeraVe. and Eucerin)  - avoid aerosols and fragrances in the home (candles, plug ins, perfume, air freshener, etc)      Numular eczema   Nummular dermatitis (or eczema) is also known as discoid eczema (or dermatitis). It has two forms:  Exudative (‘wet’) nummular dermatitis   Dry nummular dermatitis    Exudative nummular dermatitis  The exudative variant starts acutely and may persist for weeks, months and rarely years. Although it may arise at any age, most subjects are over 50 years. In children, it is often thought to be a type of atopic dermatitis, but in adults it doesn't appear to relate to atopy. It is more common in males than females.  The initial plaque may appear at the site of trauma or infection. For example:  Thermal burn   Scabies infestation   Varicose vein surgery   Insect bite   Impetigo    Nummular dermatitis is sometimes due to drug allergy (e.g. to interferon alpha, intravenous immunoglobulins, etanercept) or systematised contact allergy, especially to nickel, gold and mercury.  The initial lesions are papules or vesicles, which form confluent plaques. The plaques may be crusted, weeping or blistered and are intensely itchy. Secondary infection with pustules, pain and spreading erythema is not uncommon. Older lesions may be dry and excoriated.  Clusters of round or oval plaques may be  localised to lower legs, the backs of the hands or other sites. Nummular dermatitis may also generalise to affect scalp, face, trunk and limbs. Generally the eruption is scattered, but sometimes the plaques are distributed symmetrically. In-between skin appears normal.    Investigations  Skin swabs frequently culture abundant Staphylococcus aureus from exudative nummular dermatitis and sometimes from dry discoid dermatitis.  Skin scrapings for microscopy and fungal culture may be necessary to rule out tinea corporis.    Differential diagnosis  Discoid eczema is frequently confused with the following skin disorders:  Psoriasis (redder, scalier plaques, symmetrical distribution, typical psoriatic sites)   Tinea corporis (grouped lesions, scaly or pustular edge)   Contact dermatitis (irregular shaped and sized lesions, contact factors)   Lichen simplex (lichenified plaques, may co-exist with nummular dermatitis)   Stasis dermatitis (lower legs, circumferential, hyperpigmentation, lipodermatosclerosis)    Management  Management of nummular dermatitis may involve the following:    Skin emollients  Emollients include bath oils, soap substitutes and moisturizing creams. They can be applied to the dermatitis as frequently as required to relieve itching, scaling and dryness. Emollients should also be used on the unaffected skin to reduce dryness. It may be necessary to try several different products to find one that suits. Many people find one or more of the following helpful: glycerine and cetomacrogol cream, white soft paraffin/liquid paraffin mixed, fatty cream, wool fat lotions.  Topical steroids   Topical steroids are anti-inflammatory creams or ointments available on prescription which may clear the dermatitis and reduce irritation. The stronger products are applied to the patches just once or twice daily for 2-4 weeks. They may be repeated from time to time depending on flare ups. Mild ones such as hydrocortisone are  safe for daily use if necessary.  Antibiotics   Antibiotics (most often flucloxacillin) are often prescribed if the rash is blistered, sticky or crusted. Sometimes nummular eczema clears completely on oral antibiotics, only to recur when they are discontinued  Oral antihistamines   Antihistamine pills may reduce the itching, and are particularly helpful at night-time. They do not clear the dermatitis. Non-sedating antihistamines appear less useful for nummular eczema than first-generation antihistamines taken at night to help sleep.  Ultraviolet radiation (UV) treatment   Phototherapy several times weekly for 6-12 weeks can reduce the extent and severity of discoid eczema.  Steroid injections  Intralesional steroids are sometimes injected into one or two particularly stubborn areas of discoid eczema. This treatment is unsuitable for multiple lesions.  Oral steroids (very rarely)  Systemic steroids are reserved for severe and extensive cases of discoid eczema. They are usually prescribed for a few weeks while continuing steroid creams and emollients on residual dermatitis.  Other oral treatments   Persistent and troublesome nummular eczema is occasionally treated with methotrexate, azathioprine or ciclosporin. These medicines have important risks and side effects and require careful monitoring by a specialist dermatologist. They may be more suitable in many cases than long-term steroids.    Nummular eczema can usually be controlled with the above measures, although it has a tendency to recur when the treatment has been stopped. In most patients, nummular eczema eventually clears up completely.

## 2024-05-03 DIAGNOSIS — F41.1 GENERALIZED ANXIETY DISORDER: ICD-10-CM

## 2024-05-05 RX ORDER — BUSPIRONE HYDROCHLORIDE 10 MG/1
10 TABLET ORAL 3 TIMES DAILY
Qty: 180 TABLET | Refills: 0 | Status: SHIPPED | OUTPATIENT
Start: 2024-05-05

## 2024-05-17 DIAGNOSIS — G62.9 NEUROPATHY: ICD-10-CM

## 2024-05-21 RX ORDER — AMITRIPTYLINE HYDROCHLORIDE 25 MG/1
25 TABLET, FILM COATED ORAL
Qty: 30 TABLET | Refills: 3 | Status: SHIPPED | OUTPATIENT
Start: 2024-05-21

## 2024-06-27 DIAGNOSIS — F41.1 GENERALIZED ANXIETY DISORDER: ICD-10-CM

## 2024-06-28 RX ORDER — BUSPIRONE HYDROCHLORIDE 10 MG/1
10 TABLET ORAL 3 TIMES DAILY
Qty: 90 TABLET | Refills: 0 | Status: SHIPPED | OUTPATIENT
Start: 2024-06-28

## 2024-08-01 DIAGNOSIS — F41.1 GENERALIZED ANXIETY DISORDER: ICD-10-CM

## 2024-08-01 RX ORDER — BUSPIRONE HYDROCHLORIDE 10 MG/1
10 TABLET ORAL 3 TIMES DAILY
Qty: 90 TABLET | Refills: 0 | Status: SHIPPED | OUTPATIENT
Start: 2024-08-01

## 2024-08-27 ENCOUNTER — OFFICE VISIT (OUTPATIENT)
Age: 39
End: 2024-08-27
Payer: COMMERCIAL

## 2024-08-27 VITALS
DIASTOLIC BLOOD PRESSURE: 60 MMHG | SYSTOLIC BLOOD PRESSURE: 114 MMHG | HEART RATE: 88 BPM | HEIGHT: 74 IN | WEIGHT: 207.2 LBS | OXYGEN SATURATION: 98 % | TEMPERATURE: 96.2 F | BODY MASS INDEX: 26.59 KG/M2

## 2024-08-27 DIAGNOSIS — G89.29 CHRONIC RIGHT SHOULDER PAIN: ICD-10-CM

## 2024-08-27 DIAGNOSIS — M25.511 CHRONIC RIGHT SHOULDER PAIN: ICD-10-CM

## 2024-08-27 DIAGNOSIS — M54.16 LUMBAR RADICULOPATHY: ICD-10-CM

## 2024-08-27 DIAGNOSIS — F33.9 DEPRESSION, RECURRENT (HCC): Primary | ICD-10-CM

## 2024-08-27 DIAGNOSIS — F41.1 GENERALIZED ANXIETY DISORDER: ICD-10-CM

## 2024-08-27 PROCEDURE — 99214 OFFICE O/P EST MOD 30 MIN: CPT

## 2024-08-27 RX ORDER — AMITRIPTYLINE HYDROCHLORIDE 10 MG/1
10 TABLET ORAL
Qty: 30 TABLET | Refills: 0 | Status: SHIPPED | OUTPATIENT
Start: 2024-08-27 | End: 2024-09-26

## 2024-08-27 RX ORDER — DULOXETIN HYDROCHLORIDE 20 MG/1
20 CAPSULE, DELAYED RELEASE ORAL DAILY
Qty: 90 CAPSULE | Refills: 0 | Status: SHIPPED | OUTPATIENT
Start: 2024-08-27 | End: 2024-11-25

## 2024-08-27 NOTE — ASSESSMENT & PLAN NOTE
This pain has been a problem for the past couple years.  He has no weakness or pain on examination that would indicate he has a rotator cuff injury.  I think this is more tendinitis and related to the chest work he is doing at the gym.  I recommend taking ibuprofen 600 mg every 6 hours for the pain, can apply heat to the area.  Offered physical therapy, which the patient would like to do.  There is no trauma or exam findings that would indicate getting x-rays or an MRI at this point.  If not improving in the next 4 to 6 weeks, return for reevaluation and consider referral to orthopedics.

## 2024-08-27 NOTE — PROGRESS NOTES
Ambulatory Visit  Name: Spencer Hoover      : 1985      MRN: [id number]  Encounter Provider: Sharron Crespo PA-C  Encounter Date: 2024   Encounter department: [facility] [place]    Assessment & Plan   1. Depression, recurrent (HCC)  -     DULoxetine (CYMBALTA) 20 mg capsule; Take 1 capsule (20 mg total) by mouth daily  -     amitriptyline (ELAVIL) 10 mg tablet; Take 1 tablet (10 mg total) by mouth daily at bedtime  2. Generalized anxiety disorder  -     DULoxetine (CYMBALTA) 20 mg capsule; Take 1 capsule (20 mg total) by mouth daily  -     amitriptyline (ELAVIL) 10 mg tablet; Take 1 tablet (10 mg total) by mouth daily at bedtime  3. Lumbar radiculopathy  -     DULoxetine (CYMBALTA) 20 mg capsule; Take 1 capsule (20 mg total) by mouth daily    Patient has had issues for years between depression, anxiety, and his lumbar/pelvic pain.  He has been to pelvic physical therapy, tried multiple medications.  In particular he wants to get off the amitriptyline due to feeling groggy the next day.  Did discuss different options.  Patient is interested in going back on Wellbutrin.  However after discussion, we will try duloxetine for its properties with chronic pain syndromes.  Will start him at a low dose to try and avoid any side effects from the medication.  First we will wean him off the amitriptyline, sent 10 mg tablets to drop down from the 25 he is currently on.  He will do that for 2 weeks because he is going away for a trip and would like to be consistent with his medications while he is away.  When he returns in 2 weeks, he will stop the amitriptyline and start the 20 mg of duloxetine.  At that point he will return after being on the duloxetine for 4 weeks for reevaluation.      4. Chronic right shoulder pain  Assessment & Plan:  This pain has been a problem for the past couple years.  He has no weakness or pain on examination that would indicate he has a rotator cuff injury.  I  think this is more tendinitis and related to the chest work he is doing at the gym.  I recommend taking ibuprofen 600 mg every 6 hours for the pain, can apply heat to the area.  Offered physical therapy, which the patient would like to do.  There is no trauma or exam findings that would indicate getting x-rays or an MRI at this point.  If not improving in the next 4 to 6 weeks, return for reevaluation and consider referral to orthopedics.  Orders:  -     Ambulatory Referral to Physical Therapy; Future         History of Present Illness     HPI    Patient is here for evaluation of his medications to treat depression.  He is on amitriptyline and BuSpar.  Patient was going to therapy once or twice a month as well.  He started amitriptyline two years ago.      Pt also reports right shoulder pain.  Started a couple years ago, he has been going to the gym and this is aggravating it.  He feels it hyperextends easily and there is a popping sensation.    Review of Systems   Constitutional: Negative.    Respiratory: Negative.     Cardiovascular: Negative.    Gastrointestinal: Negative.    Genitourinary:         Chronic pelvic pain   Musculoskeletal:  Positive for arthralgias (Right shoulder pain). Negative for gait problem and joint swelling.   Neurological: Negative.    Psychiatric/Behavioral:  Positive for dysphoric mood and sleep disturbance. Negative for decreased concentration.    All other systems reviewed and are negative.    Past Medical History:   Diagnosis Date   • Allergic    • Anxiety    • Back pain    • Bloody ejaculation    • Celiac disease    • Depression      History reviewed. No pertinent surgical history.  Family History   Problem Relation Age of Onset   • Diabetes Maternal Grandmother    • Heart disease Paternal Grandfather    • No Known Problems Mother    • No Known Problems Father      Social History     Tobacco Use   • Smoking status: Never   • Smokeless tobacco: Never   Vaping Use   • Vaping status:  "Never Used   Substance and Sexual Activity   • Alcohol use: Yes     Alcohol/week: 1.0 standard drink of alcohol     Types: 1 Glasses of wine per week   • Drug use: Never   • Sexual activity: Yes     Partners: Female     Current Outpatient Medications on File Prior to Visit   Medication Sig   • busPIRone (BUSPAR) 10 mg tablet Take 1 tablet (10 mg total) by mouth 3 (three) times a day (Patient taking differently: Take 10 mg by mouth 3 (three) times a day Pt taking twice daily)   • cetirizine (ZyrTEC) 10 mg tablet Take 10 mg by mouth daily   • magnesium 30 MG tablet Take 30 mg by mouth 2 (two) times a day   • multivitamin (THERAGRAN) TABS Take 1 tablet by mouth daily   • triamcinolone (KENALOG) 0.1 % ointment Apply topically 2 (two) times a day   • [DISCONTINUED] amitriptyline (ELAVIL) 25 mg tablet TAKE ONE TABLET BY MOUTH AT BEDTIME   • pseudoephedrine (SUDAFED) 60 mg tablet Take 1 tablet (60 mg total) by mouth every 6 (six) hours as needed for congestion for up to 10 days     Allergies   Allergen Reactions   • Bactrim [Sulfamethoxazole-Trimethoprim] Hives   • Strawberry Extract - Food Allergy Other (See Comments)     throat tightness     Immunization History   Administered Date(s) Administered   • COVID-19 PFIZER VACCINE 0.3 ML IM 02/17/2021, 03/11/2021   • COVID-19 Pfizer vac (Ray-sucrose, gray cap) 12 yr+ IM 05/06/2022     Objective     /60   Pulse 88   Temp (!) 96.2 °F (35.7 °C) (Tympanic)   Ht 6' 2\" (1.88 m)   Wt 94 kg (207 lb 3.2 oz)   SpO2 98%   BMI 26.60 kg/m²     Physical Exam  Vitals and nursing note reviewed.   Constitutional:       General: He is not in acute distress.     Appearance: Normal appearance. He is not toxic-appearing.   Cardiovascular:      Rate and Rhythm: Normal rate and regular rhythm.      Heart sounds: Normal heart sounds.   Pulmonary:      Effort: Pulmonary effort is normal. No respiratory distress.      Breath sounds: Normal breath sounds.   Abdominal:      General: Bowel " sounds are normal.      Palpations: Abdomen is soft.      Tenderness: There is no abdominal tenderness.   Musculoskeletal:         General: No deformity or signs of injury.      Right shoulder: Tenderness present. No swelling, deformity, bony tenderness or crepitus. Normal range of motion. Normal strength.      Left shoulder: Normal.      Right upper arm: Normal.        Arms:    Skin:     General: Skin is warm and dry.   Neurological:      Mental Status: He is alert and oriented to person, place, and time.      Motor: No weakness or tremor.      Gait: Gait is intact.   Psychiatric:         Attention and Perception: Attention normal.         Mood and Affect: Mood is depressed.         Speech: Speech normal.         Behavior: Behavior normal.         Thought Content: Thought content normal.

## 2024-10-23 ENCOUNTER — TELEPHONE (OUTPATIENT)
Age: 39
End: 2024-10-23

## 2024-10-23 NOTE — TELEPHONE ENCOUNTER
The  pa   April   called  off  today   left  2 messages  on  vm  to  chivo  his  appt  for  today

## 2024-10-30 DIAGNOSIS — F41.1 GENERALIZED ANXIETY DISORDER: ICD-10-CM

## 2024-10-31 ENCOUNTER — OFFICE VISIT (OUTPATIENT)
Age: 39
End: 2024-10-31
Payer: COMMERCIAL

## 2024-10-31 VITALS
DIASTOLIC BLOOD PRESSURE: 82 MMHG | BODY MASS INDEX: 26.56 KG/M2 | RESPIRATION RATE: 16 BRPM | HEART RATE: 72 BPM | HEIGHT: 74 IN | SYSTOLIC BLOOD PRESSURE: 118 MMHG | WEIGHT: 207 LBS | TEMPERATURE: 96.6 F

## 2024-10-31 DIAGNOSIS — Z00.00 HEALTHCARE MAINTENANCE: ICD-10-CM

## 2024-10-31 DIAGNOSIS — F41.1 GENERALIZED ANXIETY DISORDER: Primary | ICD-10-CM

## 2024-10-31 DIAGNOSIS — F45.8 BRUXISM: ICD-10-CM

## 2024-10-31 DIAGNOSIS — Z13.6 SCREENING FOR CARDIOVASCULAR CONDITION: ICD-10-CM

## 2024-10-31 DIAGNOSIS — F33.9 DEPRESSION, RECURRENT (HCC): ICD-10-CM

## 2024-10-31 PROCEDURE — 99213 OFFICE O/P EST LOW 20 MIN: CPT

## 2024-10-31 RX ORDER — BUSPIRONE HYDROCHLORIDE 10 MG/1
10 TABLET ORAL 3 TIMES DAILY
Qty: 270 TABLET | Refills: 1 | Status: SHIPPED | OUTPATIENT
Start: 2024-10-31

## 2024-10-31 RX ORDER — DULOXETIN HYDROCHLORIDE 30 MG/1
30 CAPSULE, DELAYED RELEASE ORAL DAILY
Qty: 30 CAPSULE | Refills: 0 | Status: SHIPPED | OUTPATIENT
Start: 2024-10-31 | End: 2024-11-30

## 2024-10-31 NOTE — ASSESSMENT & PLAN NOTE
LING-7 Flowsheet Screening      Flowsheet Row Most Recent Value   Over the last two weeks, how often have you been bothered by the following problems?     Feeling nervous, anxious, or on edge 2   Not being able to stop or control worrying 2   Worrying too much about different things 2   Trouble relaxing  2   Being so restless that it's hard to sit still 2   Becoming easily annoyed or irritable  2   Feeling afraid as if something awful might happen 0   How difficult have these problems made it for you to do your work, take care of things at home, or get along with other people?  Somewhat difficult   LING Score  12          Pt prefers to increase dosage slowly.  Will increase to 30 mg.  Return in two months for reassessment and annual physical.  Orders:    DULoxetine (CYMBALTA) 30 mg delayed release capsule; Take 1 capsule (30 mg total) by mouth daily

## 2024-10-31 NOTE — PROGRESS NOTES
Ambulatory Visit  Name: Spencer Hoover      : 1985      MRN: [id number]  Encounter Provider: Sharron Crespo PA-C  Encounter Date: 10/31/2024   Encounter department: [facility] PRIMARY C[facility] [place]    Assessment & Plan  Generalized anxiety disorder  LING-7 Flowsheet Screening      Flowsheet Row Most Recent Value   Over the last two weeks, how often have you been bothered by the following problems?     Feeling nervous, anxious, or on edge 2   Not being able to stop or control worrying 2   Worrying too much about different things 2   Trouble relaxing  2   Being so restless that it's hard to sit still 2   Becoming easily annoyed or irritable  2   Feeling afraid as if something awful might happen 0   How difficult have these problems made it for you to do your work, take care of things at home, or get along with other people?  Somewhat difficult   LING Score  12          Pt prefers to increase dosage slowly.  Will increase to 30 mg.  Return in two months for reassessment and annual physical.  Orders:    DULoxetine (CYMBALTA) 30 mg delayed release capsule; Take 1 capsule (30 mg total) by mouth daily    Depression, recurrent (HCC)  Depression Screening Follow-up Plan: Patient's depression screening was positive with a PHQ-9 score of 7. Patient with underlying depression and was advised to continue current medications as prescribed. Patient assessed for underlying major depression. They have no active suicidal ideations. Brief counseling provided and recommend additional follow-up/re-evaluation next office visit.    Increase Cymbalta to 30 mg.  Return in two months for reassessment.      PHQ-2/9 Depression Screening    Little interest or pleasure in doing things: 1 - several days  Feeling down, depressed, or hopeless: 1 - several days  Trouble falling or staying asleep, or sleeping too much: 1 - several days  Feeling tired or having little energy: 1 - several days  Poor appetite or overeatin - not at  all  Feeling bad about yourself - or that you are a failure or have let yourself or your family down: 0 - not at all  Trouble concentrating on things, such as reading the newspaper or watching television: 3 - nearly every day  Moving or speaking so slowly that other people could have noticed. Or the opposite - being so fidgety or restless that you have been moving around a lot more than usual: 0 - not at all  Thoughts that you would be better off dead, or of hurting yourself in some way: 0 - not at all  PHQ-9 Score: 7  PHQ-9 Interpretation: Mild depression           Orders:    DULoxetine (CYMBALTA) 30 mg delayed release capsule; Take 1 capsule (30 mg total) by mouth daily    Bruxism  Pt advised he could get an OTC mouth guard to wear at night that is specific for teeth grinding.  Monitor for worsening jaw pain.  May need to switch off Cymbalta if the grinding becomes problematic.       Screening for cardiovascular condition    Orders:    Lipid Panel with Direct LDL reflex; Future    Healthcare maintenance  Due for annual physical.  Will return in 2 months with labs prior.  Orders:    CBC and differential; Future    Comprehensive metabolic panel; Future      Depression Screening and Follow-up Plan: Patient with underlying depression and was advised to continue current medications as prescribed.       History of Present Illness     HPI    Pt reports improvement in mood.  He had some SE, in particular sexual side effects.  Unfortunately do not have a PHQ-9 or LING-7 scale done from his visit in August.    He stopped the amitriptyline and started on the duloxetine.  He had an initial increase in his chronic pain, but then this is starting to level off.  The pain is more than what his baseline is, but he is hopeful that this will improve.  He is willing to go up on the duloxetine.    He reports bruxism that has increased with being back on the SSRI.  This is also improving.  This is not a new phenomenon when he has been  "on SSRIs.  He mentioned this to his dentist who did not feel he needed a mouthguard for at night.    Urinary hesitancy since starting the SSRI.  This is improving as well and is part of his constellation of symptoms with his pelvic issues.      Review of Systems   Constitutional: Negative.    HENT:          Jaw clenching during sleep   Respiratory: Negative.     Cardiovascular: Negative.    Gastrointestinal: Negative.    Genitourinary:  Positive for difficulty urinating. Negative for decreased urine volume, dysuria, flank pain, frequency, penile discharge, penile pain, testicular pain and urgency.        Chronic pelvic pain   Musculoskeletal:  Negative for gait problem and joint swelling.   Neurological: Negative.    Psychiatric/Behavioral:  Positive for dysphoric mood (Improved). Negative for decreased concentration and sleep disturbance.    All other systems reviewed and are negative.          Objective     /82 (BP Location: Left arm, Patient Position: Sitting, Cuff Size: Standard)   Pulse 72   Temp (!) 96.6 °F (35.9 °C) (Tympanic)   Resp 16   Ht 6' 2\" (1.88 m)   Wt 93.9 kg (207 lb)   BMI 26.58 kg/m²     Physical Exam  Vitals and nursing note reviewed.   Constitutional:       General: He is not in acute distress.     Appearance: Normal appearance.   HENT:      Head: Normocephalic and atraumatic.      Jaw: No trismus, tenderness, swelling, pain on movement or malocclusion.      Comments: Clicking of left TMJ with opening and closing jaw.     Right Ear: Hearing normal.      Left Ear: Hearing normal.      Nose: Nose normal.      Mouth/Throat:      Lips: Pink.      Mouth: Mucous membranes are moist.   Eyes:      General: Lids are normal. Vision grossly intact.   Cardiovascular:      Rate and Rhythm: Normal rate and regular rhythm.      Pulses:           Radial pulses are 2+ on the right side and 2+ on the left side.      Heart sounds: Normal heart sounds. No murmur heard.  Pulmonary:      Effort: Pulmonary " effort is normal. No respiratory distress.      Breath sounds: Normal breath sounds.   Abdominal:      General: Bowel sounds are normal.      Palpations: Abdomen is soft.      Tenderness: There is no abdominal tenderness.   Musculoskeletal:         General: No tenderness, deformity or signs of injury.      Cervical back: Normal range of motion and neck supple.      Right lower leg: No edema.      Left lower leg: No edema.   Skin:     General: Skin is warm and dry.      Coloration: Skin is not jaundiced.   Neurological:      Mental Status: He is alert and oriented to person, place, and time.      Motor: No weakness or tremor.      Gait: Gait is intact.   Psychiatric:         Mood and Affect: Mood normal. Affect is flat.         Behavior: Behavior normal.         Thought Content: Thought content normal.

## 2024-10-31 NOTE — ASSESSMENT & PLAN NOTE
Depression Screening Follow-up Plan: Patient's depression screening was positive with a PHQ-9 score of 7. Patient with underlying depression and was advised to continue current medications as prescribed. Patient assessed for underlying major depression. They have no active suicidal ideations. Brief counseling provided and recommend additional follow-up/re-evaluation next office visit.    Increase Cymbalta to 30 mg.  Return in two months for reassessment.      PHQ-2/9 Depression Screening    Little interest or pleasure in doing things: 1 - several days  Feeling down, depressed, or hopeless: 1 - several days  Trouble falling or staying asleep, or sleeping too much: 1 - several days  Feeling tired or having little energy: 1 - several days  Poor appetite or overeatin - not at all  Feeling bad about yourself - or that you are a failure or have let yourself or your family down: 0 - not at all  Trouble concentrating on things, such as reading the newspaper or watching television: 3 - nearly every day  Moving or speaking so slowly that other people could have noticed. Or the opposite - being so fidgety or restless that you have been moving around a lot more than usual: 0 - not at all  Thoughts that you would be better off dead, or of hurting yourself in some way: 0 - not at all  PHQ-9 Score: 7  PHQ-9 Interpretation: Mild depression           Orders:    DULoxetine (CYMBALTA) 30 mg delayed release capsule; Take 1 capsule (30 mg total) by mouth daily

## 2024-12-02 DIAGNOSIS — F33.9 DEPRESSION, RECURRENT (HCC): ICD-10-CM

## 2024-12-02 DIAGNOSIS — F41.1 GENERALIZED ANXIETY DISORDER: ICD-10-CM

## 2024-12-04 RX ORDER — DULOXETIN HYDROCHLORIDE 30 MG/1
30 CAPSULE, DELAYED RELEASE ORAL DAILY
Qty: 30 CAPSULE | Refills: 0 | Status: SHIPPED | OUTPATIENT
Start: 2024-12-04 | End: 2025-01-03

## 2024-12-31 ENCOUNTER — TELEPHONE (OUTPATIENT)
Age: 39
End: 2024-12-31

## 2025-01-02 DIAGNOSIS — F41.1 GENERALIZED ANXIETY DISORDER: ICD-10-CM

## 2025-01-02 DIAGNOSIS — F33.9 DEPRESSION, RECURRENT (HCC): ICD-10-CM

## 2025-01-02 RX ORDER — DULOXETIN HYDROCHLORIDE 30 MG/1
30 CAPSULE, DELAYED RELEASE ORAL DAILY
Qty: 30 CAPSULE | Refills: 0 | Status: SHIPPED | OUTPATIENT
Start: 2025-01-02 | End: 2025-02-01

## 2025-01-11 ENCOUNTER — APPOINTMENT (OUTPATIENT)
Age: 40
End: 2025-01-11
Payer: COMMERCIAL

## 2025-01-11 DIAGNOSIS — Z00.00 HEALTHCARE MAINTENANCE: ICD-10-CM

## 2025-01-11 DIAGNOSIS — Z13.6 SCREENING FOR CARDIOVASCULAR CONDITION: ICD-10-CM

## 2025-01-11 LAB
ALBUMIN SERPL BCG-MCNC: 4.4 G/DL (ref 3.5–5)
ALP SERPL-CCNC: 60 U/L (ref 34–104)
ALT SERPL W P-5'-P-CCNC: 20 U/L (ref 7–52)
ANION GAP SERPL CALCULATED.3IONS-SCNC: 7 MMOL/L (ref 4–13)
AST SERPL W P-5'-P-CCNC: 26 U/L (ref 13–39)
BASOPHILS # BLD AUTO: 0.04 THOUSANDS/ΜL (ref 0–0.1)
BASOPHILS NFR BLD AUTO: 1 % (ref 0–1)
BILIRUB SERPL-MCNC: 1.06 MG/DL (ref 0.2–1)
BUN SERPL-MCNC: 22 MG/DL (ref 5–25)
CALCIUM SERPL-MCNC: 8.9 MG/DL (ref 8.4–10.2)
CHLORIDE SERPL-SCNC: 101 MMOL/L (ref 96–108)
CHOLEST SERPL-MCNC: 172 MG/DL (ref ?–200)
CO2 SERPL-SCNC: 32 MMOL/L (ref 21–32)
CREAT SERPL-MCNC: 0.93 MG/DL (ref 0.6–1.3)
EOSINOPHIL # BLD AUTO: 0.37 THOUSAND/ΜL (ref 0–0.61)
EOSINOPHIL NFR BLD AUTO: 5 % (ref 0–6)
ERYTHROCYTE [DISTWIDTH] IN BLOOD BY AUTOMATED COUNT: 12.5 % (ref 11.6–15.1)
GFR SERPL CREATININE-BSD FRML MDRD: 103 ML/MIN/1.73SQ M
GLUCOSE P FAST SERPL-MCNC: 92 MG/DL (ref 65–99)
HCT VFR BLD AUTO: 45.7 % (ref 36.5–49.3)
HDLC SERPL-MCNC: 65 MG/DL
HGB BLD-MCNC: 15.3 G/DL (ref 12–17)
IMM GRANULOCYTES # BLD AUTO: 0.02 THOUSAND/UL (ref 0–0.2)
IMM GRANULOCYTES NFR BLD AUTO: 0 % (ref 0–2)
LDLC SERPL CALC-MCNC: 89 MG/DL (ref 0–100)
LYMPHOCYTES # BLD AUTO: 2.18 THOUSANDS/ΜL (ref 0.6–4.47)
LYMPHOCYTES NFR BLD AUTO: 30 % (ref 14–44)
MCH RBC QN AUTO: 31.5 PG (ref 26.8–34.3)
MCHC RBC AUTO-ENTMCNC: 33.5 G/DL (ref 31.4–37.4)
MCV RBC AUTO: 94 FL (ref 82–98)
MONOCYTES # BLD AUTO: 0.79 THOUSAND/ΜL (ref 0.17–1.22)
MONOCYTES NFR BLD AUTO: 11 % (ref 4–12)
NEUTROPHILS # BLD AUTO: 3.77 THOUSANDS/ΜL (ref 1.85–7.62)
NEUTS SEG NFR BLD AUTO: 53 % (ref 43–75)
NRBC BLD AUTO-RTO: 0 /100 WBCS
PLATELET # BLD AUTO: 234 THOUSANDS/UL (ref 149–390)
PMV BLD AUTO: 9.9 FL (ref 8.9–12.7)
POTASSIUM SERPL-SCNC: 3.9 MMOL/L (ref 3.5–5.3)
PROT SERPL-MCNC: 6.7 G/DL (ref 6.4–8.4)
RBC # BLD AUTO: 4.85 MILLION/UL (ref 3.88–5.62)
SODIUM SERPL-SCNC: 140 MMOL/L (ref 135–147)
TRIGL SERPL-MCNC: 90 MG/DL (ref ?–150)
WBC # BLD AUTO: 7.17 THOUSAND/UL (ref 4.31–10.16)

## 2025-01-11 PROCEDURE — 85025 COMPLETE CBC W/AUTO DIFF WBC: CPT

## 2025-01-11 PROCEDURE — 80061 LIPID PANEL: CPT

## 2025-01-11 PROCEDURE — 80053 COMPREHEN METABOLIC PANEL: CPT

## 2025-01-11 PROCEDURE — 36415 COLL VENOUS BLD VENIPUNCTURE: CPT

## 2025-01-13 ENCOUNTER — RESULTS FOLLOW-UP (OUTPATIENT)
Age: 40
End: 2025-01-13

## 2025-01-17 ENCOUNTER — OFFICE VISIT (OUTPATIENT)
Age: 40
End: 2025-01-17
Payer: COMMERCIAL

## 2025-01-17 VITALS
BODY MASS INDEX: 27.39 KG/M2 | SYSTOLIC BLOOD PRESSURE: 116 MMHG | OXYGEN SATURATION: 97 % | WEIGHT: 213.4 LBS | HEART RATE: 79 BPM | TEMPERATURE: 97.7 F | RESPIRATION RATE: 16 BRPM | HEIGHT: 74 IN | DIASTOLIC BLOOD PRESSURE: 80 MMHG

## 2025-01-17 DIAGNOSIS — F33.9 DEPRESSION, RECURRENT (HCC): ICD-10-CM

## 2025-01-17 DIAGNOSIS — R10.2 PELVIC PAIN: ICD-10-CM

## 2025-01-17 DIAGNOSIS — B35.3 TINEA PEDIS OF LEFT FOOT: ICD-10-CM

## 2025-01-17 DIAGNOSIS — Z00.00 ANNUAL PHYSICAL EXAM: Primary | ICD-10-CM

## 2025-01-17 PROCEDURE — 99395 PREV VISIT EST AGE 18-39: CPT

## 2025-01-17 RX ORDER — DULOXETIN HYDROCHLORIDE 60 MG/1
60 CAPSULE, DELAYED RELEASE ORAL DAILY
Qty: 90 CAPSULE | Refills: 0 | Status: SHIPPED | OUTPATIENT
Start: 2025-01-17 | End: 2025-04-17

## 2025-01-17 RX ORDER — PRENATAL VIT 91/IRON/FOLIC/DHA 28-975-200
COMBINATION PACKAGE (EA) ORAL 2 TIMES DAILY
Qty: 42 G | Refills: 2 | Status: SHIPPED | OUTPATIENT
Start: 2025-01-17

## 2025-01-17 RX ORDER — TAMSULOSIN HYDROCHLORIDE 0.4 MG/1
0.4 CAPSULE ORAL
Qty: 30 CAPSULE | Refills: 0 | Status: SHIPPED | OUTPATIENT
Start: 2025-01-17

## 2025-01-17 NOTE — ASSESSMENT & PLAN NOTE
Stable, but his pelvic pain increased recently.  He would like to go up on the duloxetine to see if he can have more benefit with his pelvic pain.  He will follow up as needed.  Orders:  •  DULoxetine (CYMBALTA) 60 mg delayed release capsule; Take 1 capsule (60 mg total) by mouth daily

## 2025-01-17 NOTE — PROGRESS NOTES
Adult Annual Physical  Name: Spencer Hoover      : 1985      MRN: [id number]  Encounter Provider: Sharron Crespo PA-C  Encounter Date: 2025   Encounter department: [facility] PRIMARY C[facility] [place]    Assessment & Plan  Annual physical exam    Annual physical exam completed today.  - Medical history reviewed, including existing medical conditions, medications, and surgeries.   - Labs discussed to evaluate cholesterol, blood sugar, kidney function, liver function, and other important markers of health.  - BMI evaluated and discussed.  - Cancer screenings discussed: testicular self exams  - Vaccination status reviewed and pertinent immunizations and booster shots discussed.   - Bone health reviewed.   - Skin examination.  Discussed importance of sunscreen and other preventative measures for skin cancer.    - Lifestyle and health counseling completed including diet, exercise habits, smoking status, alcohol consumption.   - Mental health and wellbeing evaluated and discussed.  - Family history obtained to identify any of hereditary health risks.          Pelvic pain  He has tried multiple things such as gabapentin, amitriptyline, and duloxetine.  Pt has not used Flomax to date and is willing to give it a try.  Discussed potential SE such as lower BP leading to LHN and increased urination frequency.  Pt did have improvement on the duloxetine, but the benefit has waned over the past two weeks.  He will increase the duloxetine to 60 mg and if he doesn't have enough benefit, he will try the Flomax in a few weeks.  Orders:  •  tamsulosin (FLOMAX) 0.4 mg; Take 1 capsule (0.4 mg total) by mouth daily with dinner    Depression, recurrent (HCC)  Stable, but his pelvic pain increased recently.  He would like to go up on the duloxetine to see if he can have more benefit with his pelvic pain.  He will follow up as needed.  Orders:  •  DULoxetine (CYMBALTA) 60 mg delayed release capsule; Take 1 capsule (60 mg  total) by mouth daily    Tinea pedis of left foot  Has a small area of redness with scaly edges of the left foot.  Apply twice daily for 2 to 4 weeks.  Keep skin dry, change socks frequently.  Orders:  •  terbinafine (LamISIL) 1 % cream; Apply topically 2 (two) times a day    Immunizations and preventive care screenings were discussed with patient today. Appropriate education was printed on patient's after visit summary.    Counseling:  Alcohol/drug use: discussed moderation in alcohol intake, the recommendations for healthy alcohol use, and avoidance of illicit drug use.  Dental Health: discussed importance of regular tooth brushing, flossing, and dental visits.  Injury prevention: discussed safety/seat belts, safety helmets, smoke detectors, carbon monoxide detectors, and smoking near bedding or upholstery.  Sexual health: discussed sexually transmitted diseases, partner selection, use of condoms, avoidance of unintended pregnancy, and contraceptive alternatives.  Exercise: the importance of regular exercise/physical activity was discussed. Recommend exercise 3-5 times per week for at least 30 minutes.          History of Present Illness     Adult Annual Physical:  Patient presents for annual physical.     Diet and Physical Activity:  - Diet/Nutrition: well balanced diet.  - Exercise: no formal exercise.    General Health:  - Sleep: sleeps well.  - Hearing: normal hearing bilateral ears.  - Vision: no vision problems.  - Dental: regular dental visits.     Health:  - History of STDs: no.   - Urinary symptoms: dysuria.     Advanced Care Planning:  - Has an advanced directive?: no    - Has a durable medical POA?: no    - ACP document given to patient?: no      Review of Systems   Constitutional: Negative.    Respiratory: Negative.     Cardiovascular: Negative.    Gastrointestinal: Negative.    Genitourinary:  Positive for dysuria.        Chronic pelvic pain   Musculoskeletal:  Negative for arthralgias, gait problem  "and joint swelling.   Neurological: Negative.    Psychiatric/Behavioral:  Negative for decreased concentration, dysphoric mood and sleep disturbance.    All other systems reviewed and are negative.        Objective   /80 (BP Location: Left arm, Patient Position: Sitting, Cuff Size: Standard)   Pulse 79   Temp 97.7 °F (36.5 °C) (Tympanic)   Resp 16   Ht 6' 2\" (1.88 m)   Wt 96.8 kg (213 lb 6.4 oz)   SpO2 97%   BMI 27.40 kg/m²     Physical Exam  Vitals and nursing note reviewed.   Constitutional:       General: He is not in acute distress.     Appearance: Normal appearance. He is not toxic-appearing.   HENT:      Head: Normocephalic and atraumatic.      Right Ear: Hearing, tympanic membrane, ear canal and external ear normal.      Left Ear: Hearing, tympanic membrane, ear canal and external ear normal.      Nose: Nose normal.      Mouth/Throat:      Lips: Pink.      Mouth: Mucous membranes are moist. No oral lesions.      Dentition: Normal dentition.      Pharynx: Oropharynx is clear.   Eyes:      General: Lids are normal. Vision grossly intact. No scleral icterus.     Conjunctiva/sclera: Conjunctivae normal.      Pupils: Pupils are equal, round, and reactive to light.   Neck:      Thyroid: No thyroid mass, thyromegaly or thyroid tenderness.   Cardiovascular:      Rate and Rhythm: Normal rate and regular rhythm.      Pulses:           Radial pulses are 2+ on the right side and 2+ on the left side.        Posterior tibial pulses are 2+ on the right side and 2+ on the left side.      Heart sounds: Normal heart sounds.   Pulmonary:      Effort: Pulmonary effort is normal. No respiratory distress.      Breath sounds: Normal breath sounds.   Abdominal:      General: Bowel sounds are normal. There is no distension.      Palpations: Abdomen is soft.      Tenderness: There is no abdominal tenderness.   Musculoskeletal:         General: No tenderness, deformity or signs of injury. Normal range of motion.      " Cervical back: Normal range of motion and neck supple.      Right lower leg: No edema.      Left lower leg: No edema.      Comments:      Lymphadenopathy:      Cervical: No cervical adenopathy.   Skin:     General: Skin is warm and dry.      Coloration: Skin is not jaundiced.   Neurological:      Mental Status: He is alert and oriented to person, place, and time.      Motor: No weakness or tremor.      Gait: Gait is intact.   Psychiatric:         Attention and Perception: Attention normal.         Mood and Affect: Mood is not depressed.         Speech: Speech normal.         Behavior: Behavior normal. Behavior is cooperative.         Thought Content: Thought content normal.

## 2025-01-17 NOTE — ASSESSMENT & PLAN NOTE
He has tried multiple things such as gabapentin, amitriptyline, and duloxetine.  Pt has not used Flomax to date and is willing to give it a try.  Discussed potential SE such as lower BP leading to LHN and increased urination frequency.  Pt did have improvement on the duloxetine, but the benefit has waned over the past two weeks.  He will increase the duloxetine to 60 mg and if he doesn't have enough benefit, he will try the Flomax in a few weeks.  Orders:  •  tamsulosin (FLOMAX) 0.4 mg; Take 1 capsule (0.4 mg total) by mouth daily with dinner

## 2025-03-26 ENCOUNTER — TELEPHONE (OUTPATIENT)
Age: 40
End: 2025-03-26

## 2025-03-26 ENCOUNTER — OFFICE VISIT (OUTPATIENT)
Age: 40
End: 2025-03-26
Payer: COMMERCIAL

## 2025-03-26 VITALS
HEART RATE: 80 BPM | HEIGHT: 74 IN | DIASTOLIC BLOOD PRESSURE: 82 MMHG | TEMPERATURE: 95.6 F | WEIGHT: 213 LBS | BODY MASS INDEX: 27.34 KG/M2 | RESPIRATION RATE: 16 BRPM | SYSTOLIC BLOOD PRESSURE: 118 MMHG | OXYGEN SATURATION: 99 %

## 2025-03-26 DIAGNOSIS — F33.9 DEPRESSION, RECURRENT (HCC): ICD-10-CM

## 2025-03-26 DIAGNOSIS — N41.1 CHRONIC PROSTATITIS/CHRONIC PELVIC PAIN SYNDROME: Primary | ICD-10-CM

## 2025-03-26 DIAGNOSIS — G89.4 CHRONIC PROSTATITIS/CHRONIC PELVIC PAIN SYNDROME: Primary | ICD-10-CM

## 2025-03-26 DIAGNOSIS — F41.1 GENERALIZED ANXIETY DISORDER: ICD-10-CM

## 2025-03-26 PROCEDURE — 99214 OFFICE O/P EST MOD 30 MIN: CPT

## 2025-03-26 RX ORDER — BUSPIRONE HYDROCHLORIDE 10 MG/1
10 TABLET ORAL 2 TIMES DAILY
Start: 2025-03-26

## 2025-03-26 RX ORDER — TADALAFIL 5 MG/1
5 TABLET ORAL DAILY
Qty: 30 TABLET | Refills: 0 | Status: SHIPPED | OUTPATIENT
Start: 2025-03-26 | End: 2025-04-25

## 2025-03-26 NOTE — TELEPHONE ENCOUNTER
S/w pharmacist from [facility] .Rx for Duloxetine 40 mg delayed release capsul not sprinkle clarified as per April Zak PERSON

## 2025-03-26 NOTE — ASSESSMENT & PLAN NOTE
Depression Screening Follow-up Plan: Patient's depression screening was positive with a PHQ-9 score of 12. Patient with underlying depression and was advised to continue current medications as prescribed. Patient assessed for underlying major depression. They have no active suicidal ideations. Brief counseling provided and recommend additional follow-up/re-evaluation next office visit. Pt is currently in therapy.    He had worsening symptoms at a higher dose of Duloxetine.  Will go back down to the 40 mg dose.  Discussed the tapering schedule.    He saw psychiatry in the past, but at this point he would rather just go back down on the duloxetine.  He is using BuSpar 2 times a day.  He did find benefit with the duloxetine and does not want to stop it at this time.  He will return in 1 month to monitor.    Orders:  •  DULoxetine HCl 40 MG CSDR; Take 40 mg by mouth daily

## 2025-03-26 NOTE — TELEPHONE ENCOUNTER
Patient called stating the script for Duloxetine HCI 40 mg requires Prior Auth    [facility]

## 2025-03-26 NOTE — ASSESSMENT & PLAN NOTE
He has been stable on the twice a day BuSpar.  No changes needed at this time per patient.  Orders:  •  busPIRone (BUSPAR) 10 mg tablet; Take 1 tablet (10 mg total) by mouth 2 (two) times a day 3/26/25-Pt stated taking twice daily

## 2025-03-26 NOTE — ASSESSMENT & PLAN NOTE
Patient has tried multiple medications and modalities such as Flomax, amitriptyline, gabapentin, and other SSRIs to try and help his chronic pelvic pain and penile sensitivity without relief.  He found during his research that tadalafil may be useful.  When researching through [employer] reference, this was listed as a possible modality to help with his symptoms.  Patient has suffered for several years with this.  He has not been willing to undergo a cystoscopy, but he has done a urinary flow study and MRIs which have not shown obstructions.  Discussed potential side effects including priapism and the need for emergent evaluation.  Patient will return in 1 month to review effectiveness.    Of note, he does have a history of lumbar radiculopathy.  Commended by urology that he follow-up with spine to consider epidural injections.  He was following with Dr. Navarro with spine surgery.  Orders:  •  tadalafil (CIALIS) 5 MG tablet; Take 1 tablet (5 mg total) by mouth daily

## 2025-03-26 NOTE — PROGRESS NOTES
Name: Spencer Hoover      : 1985      MRN: [id number]  Encounter Provider: Sharron Crespo PA-C  Encounter Date: 3/26/2025   Encounter department: [facility] PRIMARY CARE [place]  :  Assessment & Plan  Chronic prostatitis/chronic pelvic pain syndrome  Patient has tried multiple medications and modalities such as Flomax, amitriptyline, gabapentin, and other SSRIs to try and help his chronic pelvic pain and penile sensitivity without relief.  He found during his research that tadalafil may be useful.  When researching through [employer] reference, this was listed as a possible modality to help with his symptoms.  Patient has suffered for several years with this.  He has not been willing to undergo a cystoscopy, but he has done a urinary flow study and MRIs which have not shown obstructions.  Discussed potential side effects including priapism and the need for emergent evaluation.  Patient will return in 1 month to review effectiveness.    Of note, he does have a history of lumbar radiculopathy.  Commended by urology that he follow-up with spine to consider epidural injections.  He was following with Dr. Navarro with spine surgery.  Orders:  •  tadalafil (CIALIS) 5 MG tablet; Take 1 tablet (5 mg total) by mouth daily    Depression, recurrent (HCC)  Depression Screening Follow-up Plan: Patient's depression screening was positive with a PHQ-9 score of 12. Patient with underlying depression and was advised to continue current medications as prescribed. Patient assessed for underlying major depression. They have no active suicidal ideations. Brief counseling provided and recommend additional follow-up/re-evaluation next office visit. Pt is currently in therapy.    He had worsening symptoms at a higher dose of Duloxetine.  Will go back down to the 40 mg dose.  Discussed the tapering schedule.    He saw psychiatry in the past, but at this point he would rather just go back down on the duloxetine.  He is using  "BuSpar 2 times a day.  He did find benefit with the duloxetine and does not want to stop it at this time.  He will return in 1 month to monitor.    Orders:  •  DULoxetine HCl 40 MG CSDR; Take 40 mg by mouth daily    Generalized anxiety disorder  He has been stable on the twice a day BuSpar.  No changes needed at this time per patient.  Orders:  •  busPIRone (BUSPAR) 10 mg tablet; Take 1 tablet (10 mg total) by mouth 2 (two) times a day 3/26/25-Pt stated taking twice daily          Depression Screening and Follow-up Plan:   Patient with underlying depression and was advised to continue current medications as prescribed. Yes      History of Present Illness   HPI    Pt is here to discuss medication side effects.  He tried flomax for 3-4 days and experienced drop in his BP and dizziness as well as retrograde ejaculation.  He would like to try tadalafil daily to help with his ED symptoms along with his chronic pelvic pain  syndrome.    As far as the duloxetine he is not tolerating the 60 mg dose very well.  GI issues, sexual side effects, and feels more anxious at this higher dose.      Review of Systems   Constitutional: Negative.    Respiratory: Negative.     Cardiovascular: Negative.    Genitourinary:  Positive for difficulty urinating and penile pain. Negative for decreased urine volume, dysuria, hematuria, penile discharge, penile swelling, scrotal swelling, testicular pain and urgency.   Musculoskeletal:  Negative for gait problem.   Neurological: Negative.    Psychiatric/Behavioral:  Negative for decreased concentration, sleep disturbance and suicidal ideas. The patient is nervous/anxious. The patient is not hyperactive.    All other systems reviewed and are negative.      Objective   /82 (BP Location: Left arm, Patient Position: Sitting)   Pulse 80   Temp (!) 95.6 °F (35.3 °C) (Tympanic)   Resp 16   Ht 6' 2\" (1.88 m)   Wt 96.6 kg (213 lb)   SpO2 99%   BMI 27.35 kg/m²      Physical Exam  Vitals and " nursing note reviewed.   Constitutional:       General: He is not in acute distress.     Appearance: Normal appearance.   Cardiovascular:      Rate and Rhythm: Normal rate and regular rhythm.      Heart sounds: Normal heart sounds.   Pulmonary:      Effort: Pulmonary effort is normal. No respiratory distress.      Breath sounds: Normal breath sounds.   Genitourinary:     Comments: Deferred  Musculoskeletal:         General: No tenderness, deformity or signs of injury.   Skin:     General: Skin is warm and dry.   Neurological:      Mental Status: He is alert and oriented to person, place, and time.   Psychiatric:         Mood and Affect: Mood normal.         Behavior: Behavior normal.

## 2025-03-27 NOTE — TELEPHONE ENCOUNTER
PA for DULoxetine HCl 40 MG CSDR SUBMITTED to [facility]    via    [x][employer]-Case ID #   [x]PA sent as URGENT    All office notes, labs and other pertaining documents and studies sent. Clinical questions answered. Awaiting determination from insurance company.     Turnaround time for your insurance to make a decision on your Prior Authorization can take 7-21 business days.

## 2025-03-27 NOTE — TELEPHONE ENCOUNTER
PA for  DULoxetine HCl 40 MG CSDR  APPROVED     Date(s) approved     Patient advised by          [x]MyChart Message  []Phone call   []LMOM  []L/M to call office as no active Communication consent on file  []Unable to leave detailed message as VM not approved on Communication consent       Pharmacy advised by    []Fax  [x]Phone call  []Secure Chat    Spoke with [facility] verified patient received paid claim, copay $75 for 30 CAPSULES, pharmacy waiting for MD to callback to verify if sprinkle caps were purposely prescribed or should patient be on regular DR capsules. [facility] will reach out to patient to discuss what they would like the pharmacy to do.     Approval letter scanned into [employer] UPON DETERMINATION

## 2025-04-01 ENCOUNTER — TELEPHONE (OUTPATIENT)
Age: 40
End: 2025-04-01

## 2025-04-01 DIAGNOSIS — N41.1 CHRONIC PROSTATITIS/CHRONIC PELVIC PAIN SYNDROME: Primary | ICD-10-CM

## 2025-04-01 DIAGNOSIS — G89.4 CHRONIC PROSTATITIS/CHRONIC PELVIC PAIN SYNDROME: Primary | ICD-10-CM

## 2025-04-01 RX ORDER — DULOXETIN HYDROCHLORIDE 30 MG/1
30 CAPSULE, DELAYED RELEASE ORAL DAILY
Qty: 100 CAPSULE | Refills: 3 | Status: SHIPPED | OUTPATIENT
Start: 2025-04-01

## 2025-04-01 NOTE — TELEPHONE ENCOUNTER
Patient called, states he had a medication Duloxetine 60mg delayed release caps dose change to 40mg oral cap with delayed release sprinkle  Patient states the cost is $75.00 not affordable.   He states the medication never was that costly.      DULoxetine HCl 40 MG CSDR Take 40 mg by mouth daily 100 capsule 1 ordered Approved       Please verify if sprinkle caps were purposely prescribed or should patient be on regular DR capsules.     Please advise, Thank you.

## 2025-04-23 ENCOUNTER — OFFICE VISIT (OUTPATIENT)
Age: 40
End: 2025-04-23
Payer: COMMERCIAL

## 2025-04-23 VITALS
OXYGEN SATURATION: 99 % | BODY MASS INDEX: 27.54 KG/M2 | HEART RATE: 79 BPM | WEIGHT: 214.6 LBS | TEMPERATURE: 97.5 F | DIASTOLIC BLOOD PRESSURE: 62 MMHG | RESPIRATION RATE: 15 BRPM | SYSTOLIC BLOOD PRESSURE: 112 MMHG | HEIGHT: 74 IN

## 2025-04-23 DIAGNOSIS — N41.1 CHRONIC PROSTATITIS/CHRONIC PELVIC PAIN SYNDROME: ICD-10-CM

## 2025-04-23 DIAGNOSIS — F32.2 SEVERE MAJOR DEPRESSIVE DISORDER (HCC): Primary | ICD-10-CM

## 2025-04-23 DIAGNOSIS — G89.4 CHRONIC PROSTATITIS/CHRONIC PELVIC PAIN SYNDROME: ICD-10-CM

## 2025-04-23 DIAGNOSIS — G47.00 INSOMNIA, UNSPECIFIED TYPE: ICD-10-CM

## 2025-04-23 PROCEDURE — 99214 OFFICE O/P EST MOD 30 MIN: CPT

## 2025-04-23 RX ORDER — TADALAFIL 2.5 MG/1
2.5 TABLET ORAL DAILY
Qty: 30 TABLET | Refills: 0 | Status: SHIPPED | OUTPATIENT
Start: 2025-04-23 | End: 2025-05-23

## 2025-04-23 RX ORDER — DOXEPIN 3 MG/1
3 TABLET, FILM COATED ORAL
Qty: 30 TABLET | Refills: 0 | Status: SHIPPED | OUTPATIENT
Start: 2025-04-23 | End: 2025-07-22

## 2025-04-23 RX ORDER — TADALAFIL 5 MG/1
5 TABLET ORAL DAILY
Qty: 30 TABLET | Refills: 0 | Status: CANCELLED | OUTPATIENT
Start: 2025-04-23 | End: 2025-05-23

## 2025-04-23 NOTE — PROGRESS NOTES
Name: Spencer Hoover      : 1985      MRN: [id number]  Encounter Provider: Sharron Crespo PA-C  Encounter Date: 2025   Encounter department: [facility] [place]  :  Assessment & Plan  Chronic prostatitis/chronic pelvic pain syndrome  Tadalafil is working, but has heart burn at the 5 mg dose.  Will reduce to 2.5 mg a day.  If nto working well enough, will increase back to the 5 mg dose.  May consider pantoprazole if heartburn is bothersome.  His pain is still present, but his urinary symptoms are nearly resolved.  Orders:  •  tadalafil (CIALIS) 2.5 MG tablet; Take 1 tablet (2.5 mg total) by mouth daily    Severe major depressive disorder (HCC)  Stable on the 30 mg of duloxetine.  Will remain on this dose.         Insomnia, unspecified type  Struggling with sleep maintenance.  Has tried multiple medications in the past, but often has next day sleepiness.  Will start with low dose of doxepin, advised to take an hour before bed.  Safe to take with duloxetine.  He will reach out with how it is working.  Pt has a low stop bang score so low suspicion for sleep apnea.  Orders:  •  Doxepin HCl 3 MG TABS; Take 1 tablet (3 mg total) by mouth daily at bedtime as needed (insomnia)           History of Present Illness   HPI    Patient is here for chronic pelvic pain syndrome follow-up.  In his research he found that tadalafil may be helpful.  After discussing side effects, decided to do a trial of this.  Did recommend he follow-up with urology and follow-up with spine to consider epidural injections.    The urinary problems are gone.  He doesn't have hesitancy.  Pain is reduced, but not completely gone.  He     Also attempted to increase his duloxetine, but 40 mg dose was far more expensive.  He was not doing well on the 60 mg dose due to side effects.  Put him down to the 30 mg instead due to cost.  He also is doing well with the BuSpar.    STOP-BANG Questionnaire (ALIN)     Snoring Loud  "enough to hear through a door. YES   Tired Falling asleep while driving, watching TV YES   Observed Gasping or choking while sleeping. NO   Pressure Hypertension? NO   BMI BMI > 35? NO   Age Older than 50? NO   Neck Size >17 inches Male or 16 inches Female? NO   Male  YES        Risk Low Risk (0-2)  Intermediate Risk (3-4)  High Risk (5-8) 3/8  INTERMEDIATE RISK       Review of Systems   Constitutional: Negative.    Respiratory: Negative.     Cardiovascular: Negative.    Genitourinary:  Positive for difficulty urinating and penile pain. Negative for decreased urine volume, dysuria, hematuria, penile discharge, penile swelling, scrotal swelling, testicular pain and urgency.   Musculoskeletal:  Negative for gait problem.   Neurological: Negative.    Psychiatric/Behavioral:  Negative for decreased concentration, sleep disturbance and suicidal ideas. The patient is not nervous/anxious and is not hyperactive.    All other systems reviewed and are negative.      Objective   /62 (BP Location: Left arm, Patient Position: Sitting, Cuff Size: Standard)   Pulse 79   Temp 97.5 °F (36.4 °C) (Tympanic)   Resp 15   Ht 6' 2\" (1.88 m)   Wt 97.3 kg (214 lb 9.6 oz)   SpO2 99%   BMI 27.55 kg/m²      Physical Exam  Vitals and nursing note reviewed.   Constitutional:       General: He is not in acute distress.     Appearance: Normal appearance. He is not toxic-appearing.   Cardiovascular:      Rate and Rhythm: Normal rate.   Pulmonary:      Effort: Pulmonary effort is normal.   Musculoskeletal:         General: No tenderness, deformity or signs of injury.   Skin:     General: Skin is warm and dry.   Neurological:      Mental Status: He is alert and oriented to person, place, and time.   Psychiatric:         Mood and Affect: Mood normal.         Behavior: Behavior normal.         "

## 2025-04-23 NOTE — ASSESSMENT & PLAN NOTE
Tadalafil is working, but has heart burn at the 5 mg dose.  Will reduce to 2.5 mg a day.  If nto working well enough, will increase back to the 5 mg dose.  May consider pantoprazole if heartburn is bothersome.  His pain is still present, but his urinary symptoms are nearly resolved.  Orders:  •  tadalafil (CIALIS) 2.5 MG tablet; Take 1 tablet (2.5 mg total) by mouth daily

## 2025-05-22 DIAGNOSIS — N41.1 CHRONIC PROSTATITIS/CHRONIC PELVIC PAIN SYNDROME: ICD-10-CM

## 2025-05-22 DIAGNOSIS — G89.4 CHRONIC PROSTATITIS/CHRONIC PELVIC PAIN SYNDROME: ICD-10-CM

## 2025-05-23 RX ORDER — TADALAFIL 5 MG/1
5 TABLET ORAL DAILY
Qty: 30 TABLET | Refills: 0 | Status: SHIPPED | OUTPATIENT
Start: 2025-05-23 | End: 2025-06-22

## 2025-06-06 ENCOUNTER — RA CDI HCC (OUTPATIENT)
Dept: OTHER | Facility: HOSPITAL | Age: 40
End: 2025-06-06

## 2025-06-12 ENCOUNTER — OFFICE VISIT (OUTPATIENT)
Age: 40
End: 2025-06-12
Payer: COMMERCIAL

## 2025-06-12 ENCOUNTER — APPOINTMENT (OUTPATIENT)
Age: 40
End: 2025-06-12
Payer: COMMERCIAL

## 2025-06-12 VITALS
BODY MASS INDEX: 26.64 KG/M2 | HEART RATE: 70 BPM | OXYGEN SATURATION: 98 % | SYSTOLIC BLOOD PRESSURE: 116 MMHG | TEMPERATURE: 97.6 F | WEIGHT: 207.6 LBS | HEIGHT: 74 IN | DIASTOLIC BLOOD PRESSURE: 64 MMHG | RESPIRATION RATE: 16 BRPM

## 2025-06-12 DIAGNOSIS — M25.522 LEFT ELBOW PAIN: Primary | ICD-10-CM

## 2025-06-12 DIAGNOSIS — M25.522 LEFT ELBOW PAIN: ICD-10-CM

## 2025-06-12 DIAGNOSIS — G56.22 NEUROPATHY OF LEFT ULNAR NERVE AT WRIST: ICD-10-CM

## 2025-06-12 PROCEDURE — 99214 OFFICE O/P EST MOD 30 MIN: CPT

## 2025-06-12 PROCEDURE — 73080 X-RAY EXAM OF ELBOW: CPT

## 2025-06-12 NOTE — PROGRESS NOTES
Name: Spencer Hoover      : 1985      MRN: [id number]  Encounter Provider: Sharron Crespo PA-C  Encounter Date: 2025   Encounter department: [facility]  :  Assessment & Plan  Left elbow pain  Differential includes tendonitis and cubital tunnel.  I'm more incline to think this is cubital tunnel due to the neuropathy he experiences in the ulnar distribution, which is consistent with the side of his elbow that hurts.  Offered NSAIDs, but pt is on duloxetine and he is concerned about bleeding risk.  Offered PT, but he states this is cost prohibitive.  Will obtain xray of elbow and refer to ortho to discuss options.   Advised to use topical NSAIDs and Tylenol, compression, ice, and rest.  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  He is left hand dominant.  Happens with positioning during certain activities.  Refer to ortho, reviewed ergonimics, and may use topical diclofenac.  Orders:  •  Diclofenac Sodium (VOLTAREN) 1 %; Apply 2 g topically 4 (four) times a day  •  Ambulatory Referral to Orthopedic Surgery; Future           History of Present Illness   Elbow Pain  Associated symptoms include arthralgias (left elbow) and numbness (left pinkie). Pertinent negatives include no joint swelling or myalgias.       Pt is here to discuss left elbow pain that started two months ago.  No increased activity or particular event that triggered it.  Denies swelling or redness.  He has always had numbness in his left pinkie at times, but it is worsened with this elbow pain.  He does work at the computer/desk job.  He doesn't play pickleball or tennis.  He does lift weights, but not lifting heavier than usual.  Occasionally keeps him up at night.  It hurts  "daily.  It is a 7/10 burning, sharp pain that occurs for a minute and then goes away.  Otherwise it is usually just a dull ache.  He has not taken any medications for this.  He took a week off from the gym and that did not improve it.  He feels the numbness the most when he is playing video games.  Denies any past trauma to the elbow.      Review of Systems   Constitutional: Negative.    Respiratory: Negative.     Cardiovascular: Negative.    Musculoskeletal:  Positive for arthralgias (left elbow). Negative for gait problem, joint swelling and myalgias.   Neurological:  Positive for numbness (left pinkie).   All other systems reviewed and are negative.      Objective   /64 (BP Location: Left arm, Patient Position: Sitting, Cuff Size: Large)   Pulse 70   Temp 97.6 °F (36.4 °C) (Tympanic)   Resp 16   Ht 6' 2\" (1.88 m)   Wt 94.2 kg (207 lb 9.6 oz)   SpO2 98%   BMI 26.65 kg/m²      Physical Exam  Constitutional:       General: He is not in acute distress.     Appearance: Normal appearance.     Cardiovascular:      Rate and Rhythm: Normal rate and regular rhythm.      Heart sounds: Normal heart sounds.   Pulmonary:      Effort: Pulmonary effort is normal. No respiratory distress.      Breath sounds: Normal breath sounds.   Abdominal:      Palpations: Abdomen is soft.      Tenderness: There is no abdominal tenderness.     Musculoskeletal:         General: Normal range of motion.      Right elbow: Normal.      Left elbow: No deformity or effusion. Normal range of motion. Tenderness present in medial epicondyle. No radial head, lateral epicondyle or olecranon process tenderness.      Right forearm: Normal.      Left forearm: Normal.      Right wrist: Normal.      Left wrist: Normal.      Right hand: Normal.      Left hand: Normal.     Skin:     General: Skin is warm and dry.     Neurological:      Mental Status: He is alert and oriented to person, place, and time.     Psychiatric:         Mood and Affect: Mood " normal.

## 2025-06-13 ENCOUNTER — RESULTS FOLLOW-UP (OUTPATIENT)
Age: 40
End: 2025-06-13

## 2025-06-19 ENCOUNTER — OFFICE VISIT (OUTPATIENT)
Dept: OBGYN CLINIC | Facility: CLINIC | Age: 40
End: 2025-06-19
Payer: COMMERCIAL

## 2025-06-19 VITALS — HEIGHT: 74 IN | RESPIRATION RATE: 18 BRPM | BODY MASS INDEX: 26.69 KG/M2 | WEIGHT: 208 LBS

## 2025-06-19 DIAGNOSIS — G56.22 CUBITAL TUNNEL SYNDROME ON LEFT: Primary | ICD-10-CM

## 2025-06-19 PROCEDURE — 99243 OFF/OP CNSLTJ NEW/EST LOW 30: CPT | Performed by: FAMILY MEDICINE

## 2025-06-19 NOTE — ASSESSMENT & PLAN NOTE
40-year-old left-hand-dominant male with left elbow pain and left forearm and hand pain and numbness more than 2 years duration, worsening over the past 2 months.  X-rays left elbow unremarkable for osseous abnormality.  Clinical impression is that he may have symptoms from cubital tunnel syndrome.  At this time I will refer him for ultrasound of the elbow to evaluate for cubital tunnel syndrome.  In interim I recommend continuing wearing elbow sleeve at night to limit hyperflexion of the elbow.  May continue topical diclofenac and may consider topical CBD.  Start taking turmeric, tart cherry, and glucosamine supplements.  Start formal therapy as soon as possible and do home exercises as directed.  Pending results of ultrasound may refer to weight exertion.  Follow-up after having ultrasound done.  Orders:    US Cubital Tunnel; Future    Ambulatory Referral to Occupational Therapy; Future

## 2025-06-19 NOTE — PROGRESS NOTES
Name: Spencer Hoover      : 1985      MRN: [id number]  Encounter Provider: Shar Daily DO  Encounter Date: 2025   Encounter department: [facility] [place]  :  Assessment & Plan  Cubital tunnel syndrome on left  40-year-old left-hand-dominant male with left elbow pain and left forearm and hand pain and numbness more than 2 years duration, worsening over the past 2 months.  X-rays left elbow unremarkable for osseous abnormality.  Clinical impression is that he may have symptoms from cubital tunnel syndrome.  At this time I will refer him for ultrasound of the elbow to evaluate for cubital tunnel syndrome.  In interim I recommend continuing wearing elbow sleeve at night to limit hyperflexion of the elbow.  May continue topical diclofenac and may consider topical CBD.  Start taking turmeric, tart cherry, and glucosamine supplements.  Start formal therapy as soon as possible and do home exercises as directed.  Pending results of ultrasound may refer to weight exertion.  Follow-up after having ultrasound done.  Orders:    US Cubital Tunnel; Future    Ambulatory Referral to Occupational Therapy; Future        History of Present Illness   Chief Complaint   Patient presents with    Left Elbow - Pain, Numbness      HPI  Spencer Hoover is a 40 y.o. male left-hand-dominant who presents for evaluation of left elbow pain and left forearm and hand pain and numbness more than 2 years duration.  He denies trauma or inciting event.  Pain described as gradual in onset, localized to the medial aspect the elbow, achy and throbbing and burning, sometimes sharp and shooting and radiating distally to the ulnar aspect forearm and 4th and 5th digits, associated numb tingling, worse with certain activities and bothers him at nighttime, and improved with changing position.  He states that when he experiences intense pain and numbness he has to extend the elbow and limit  "stressing activities.  He states that over the past 2 months pain and numbness have become more bothersome and persistent.  He was seen by primary care provider.  He was prescribed topical diclofenac, referred for x-rays, and referred to orthopedic care.    History obtained from: patient    Review of Systems   Musculoskeletal:  Positive for arthralgias. Negative for joint swelling.   Neurological:  Positive for numbness. Negative for weakness.          Objective   Resp 18   Ht 6' 2\" (1.88 m)   Wt 94.3 kg (208 lb)   BMI 26.71 kg/m²      Physical Exam  Vitals and nursing note reviewed.   Constitutional:       Appearance: Normal appearance. He is well-developed. He is not ill-appearing or diaphoretic.   HENT:      Head: Normocephalic and atraumatic.      Right Ear: External ear normal.      Left Ear: External ear normal.     Eyes:      General:         Right eye: No discharge.         Left eye: No discharge.     Pulmonary:      Effort: Pulmonary effort is normal. No respiratory distress.   Abdominal:      General: There is no distension.     Musculoskeletal:         General: Tenderness present. No swelling, deformity or signs of injury.     Skin:     General: Skin is warm and dry.      Coloration: Skin is not jaundiced or pale.     Neurological:      Mental Status: He is alert and oriented to person, place, and time.     Psychiatric:         Mood and Affect: Mood normal.         Behavior: Behavior normal.         Thought Content: Thought content normal.         Judgment: Judgment normal.       Right Hand Exam     Muscle Strength   The patient has normal right wrist strength.    Other   Sensation: normal  Pulse: present      Left Hand Exam     Tenderness   The patient is experiencing no tenderness.     Range of Motion   The patient has normal left wrist ROM.    Muscle Strength   The patient has normal left wrist strength.    Tests   Tinel's sign (median nerve): negative    Other   Sensation: normal  Pulse: " present      Right Elbow Exam     Other   Sensation: normal      Left Elbow Exam     Tenderness   The patient is experiencing tenderness in the medial epicondyle.     Range of Motion   The patient has normal left elbow ROM.    Muscle Strength   The patient has normal left elbow strength (5/5 flexion and extension).    Tests   Varus: negative  Valgus: negative  Tinel's sign (cubital tunnel): positive    Other   Sensation: normal    Comments:  Negative radial tunnel Tinel's      Right Shoulder Exam     Other   Sensation: normal      Left Shoulder Exam     Range of Motion   The patient has normal left shoulder ROM.    Muscle Strength   Abduction: 5/5   Internal rotation: 5/5   External rotation: 5/5   Supraspinatus: 5/5     Other   Sensation: normal     Comments:  Negative empty can             I have personally reviewed pertinent films in PACS and my interpretation is  .   X-rays left elbow unremarkable for osseous abnormality.    Administrative Statements   I have spent a total time of 30 minutes in caring for this patient on the day of the visit/encounter including Diagnostic results, Prognosis, Risks and benefits of tx options, Instructions for management, Patient and family education, Impressions, Documenting in the medical record, Reviewing/placing orders in the medical record (including tests, medications, and/or procedures), and Obtaining or reviewing history  .

## 2025-06-19 NOTE — LETTER
2025     Sharron Crespo PA-C  [address] [place] [address]    Patient: Spencer Hoover   YOB: 1985   Date of Visit: 2025       Dear Dr. Sharron Crespo PA-C:    Thank you for referring Spencer Hoover to me for evaluation. Below are my notes for this consultation.    If you have questions, please do not hesitate to call me. I look forward to following your patient along with you.         Sincerely,        Shar Daily DO        CC: No Recipients    Shar Daily DO  2025 12:40 PM  Sign when Signing Visit  Name: Spencer Hoover      : 1985      MRN: [id number]  Encounter Provider: Shar Daily DO  Encounter Date: 2025   Encounter department: [facility] [place]  :  Assessment & Plan  Cubital tunnel syndrome on left  40-year-old left-hand-dominant male with left elbow pain and left forearm and hand pain and numbness more than 2 years duration, worsening over the past 2 months.  X-rays left elbow unremarkable for osseous abnormality.  Clinical impression is that he may have symptoms from cubital tunnel syndrome.  At this time I will refer him for ultrasound of the elbow to evaluate for cubital tunnel syndrome.  In interim I recommend continuing wearing elbow sleeve at night to limit hyperflexion of the elbow.  May continue topical diclofenac and may consider topical CBD.  Start taking turmeric, tart cherry, and glucosamine supplements.  Start formal therapy as soon as possible and do home exercises as directed.  Pending results of ultrasound may refer to weight exertion.  Follow-up after having ultrasound done.  Orders:  •  US Cubital Tunnel; Future  •  Ambulatory Referral to Occupational Therapy; Future        History of Present Illness  Chief Complaint   Patient presents with   • Left Elbow - Pain, Numbness      HPI  Spencer Hoover is a 40 y.o. male  "left-hand-dominant who presents for evaluation of left elbow pain and left forearm and hand pain and numbness more than 2 years duration.  He denies trauma or inciting event.  Pain described as gradual in onset, localized to the medial aspect the elbow, achy and throbbing and burning, sometimes sharp and shooting and radiating distally to the ulnar aspect forearm and 4th and 5th digits, associated numb tingling, worse with certain activities and bothers him at nighttime, and improved with changing position.  He states that when he experiences intense pain and numbness he has to extend the elbow and limit stressing activities.  He states that over the past 2 months pain and numbness have become more bothersome and persistent.  He was seen by primary care provider.  He was prescribed topical diclofenac, referred for x-rays, and referred to orthopedic care.    History obtained from: patient    Review of Systems   Musculoskeletal:  Positive for arthralgias. Negative for joint swelling.   Neurological:  Positive for numbness. Negative for weakness.          Objective  Resp 18   Ht 6' 2\" (1.88 m)   Wt 94.3 kg (208 lb)   BMI 26.71 kg/m²      Physical Exam  Vitals and nursing note reviewed.   Constitutional:       Appearance: Normal appearance. He is well-developed. He is not ill-appearing or diaphoretic.   HENT:      Head: Normocephalic and atraumatic.      Right Ear: External ear normal.      Left Ear: External ear normal.     Eyes:      General:         Right eye: No discharge.         Left eye: No discharge.     Pulmonary:      Effort: Pulmonary effort is normal. No respiratory distress.   Abdominal:      General: There is no distension.     Musculoskeletal:         General: Tenderness present. No swelling, deformity or signs of injury.     Skin:     General: Skin is warm and dry.      Coloration: Skin is not jaundiced or pale.     Neurological:      Mental Status: He is alert and oriented to person, place, and time. "     Psychiatric:         Mood and Affect: Mood normal.         Behavior: Behavior normal.         Thought Content: Thought content normal.         Judgment: Judgment normal.       Right Hand Exam     Muscle Strength   The patient has normal right wrist strength.    Other   Sensation: normal  Pulse: present      Left Hand Exam     Tenderness   The patient is experiencing no tenderness.     Range of Motion   The patient has normal left wrist ROM.    Muscle Strength   The patient has normal left wrist strength.    Tests   Tinel's sign (median nerve): negative    Other   Sensation: normal  Pulse: present      Right Elbow Exam     Other   Sensation: normal      Left Elbow Exam     Tenderness   The patient is experiencing tenderness in the medial epicondyle.     Range of Motion   The patient has normal left elbow ROM.    Muscle Strength   The patient has normal left elbow strength (5/5 flexion and extension).    Tests   Varus: negative  Valgus: negative  Tinel's sign (cubital tunnel): positive    Other   Sensation: normal    Comments:  Negative radial tunnel Tinel's      Right Shoulder Exam     Other   Sensation: normal      Left Shoulder Exam     Range of Motion   The patient has normal left shoulder ROM.    Muscle Strength   Abduction: 5/5   Internal rotation: 5/5   External rotation: 5/5   Supraspinatus: 5/5     Other   Sensation: normal     Comments:  Negative empty can             I have personally reviewed pertinent films in PACS and my interpretation is  .   X-rays left elbow unremarkable for osseous abnormality.    Administrative Statements  I have spent a total time of 30 minutes in caring for this patient on the day of the visit/encounter including Diagnostic results, Prognosis, Risks and benefits of tx options, Instructions for management, Patient and family education, Impressions, Documenting in the medical record, Reviewing/placing orders in the medical record (including tests, medications, and/or  procedures), and Obtaining or reviewing history  .

## 2025-06-19 NOTE — PATIENT INSTRUCTIONS
Over the counter vitamins:    - Turmeric vitamin at least 1000 mg daily    - Tart cherry vitamin at least 1000 mg daily    - Glucosamine-chondrointin 2-3 times daily    Elbow sleeve at night    Topical Over the counter CBD    Epsom Salt Soaks    Schedule for Ultrasound for cubital tunnel    Schedule with elbow therapist

## 2025-06-23 DIAGNOSIS — N41.1 CHRONIC PROSTATITIS/CHRONIC PELVIC PAIN SYNDROME: ICD-10-CM

## 2025-06-23 DIAGNOSIS — G89.4 CHRONIC PROSTATITIS/CHRONIC PELVIC PAIN SYNDROME: ICD-10-CM

## 2025-06-23 RX ORDER — TADALAFIL 5 MG/1
5 TABLET ORAL DAILY
Qty: 30 TABLET | Refills: 0 | Status: SHIPPED | OUTPATIENT
Start: 2025-06-23 | End: 2025-07-23

## 2025-06-25 ENCOUNTER — HOSPITAL ENCOUNTER (OUTPATIENT)
Dept: ULTRASOUND IMAGING | Facility: HOSPITAL | Age: 40
Discharge: HOME | End: 2025-06-25
Attending: FAMILY MEDICINE
Payer: COMMERCIAL

## 2025-06-25 DIAGNOSIS — G56.22 CUBITAL TUNNEL SYNDROME ON LEFT: ICD-10-CM

## 2025-06-25 PROCEDURE — 76882 US LMTD JT/FCL EVL NVASC XTR: CPT

## 2025-07-01 ENCOUNTER — EVALUATION (OUTPATIENT)
Dept: OCCUPATIONAL THERAPY | Facility: CLINIC | Age: 40
End: 2025-07-01
Attending: FAMILY MEDICINE
Payer: COMMERCIAL

## 2025-07-01 DIAGNOSIS — G56.22 CUBITAL TUNNEL SYNDROME ON LEFT: ICD-10-CM

## 2025-07-01 PROCEDURE — 97165 OT EVAL LOW COMPLEX 30 MIN: CPT | Performed by: OCCUPATIONAL THERAPIST

## 2025-07-01 PROCEDURE — 97110 THERAPEUTIC EXERCISES: CPT | Performed by: OCCUPATIONAL THERAPIST

## 2025-07-01 NOTE — PROGRESS NOTES
OT Evaluation     Today's date: 2025  Patient name: Spencer Hoover  : 1985  MRN: [id number]  Referring provider: Shar Daily*  Dx:   Encounter Diagnosis     ICD-10-CM    1. Cubital tunnel syndrome on left  G56.22 Ambulatory Referral to Occupational Therapy                     Assessment  Impairments: activity intolerance, lacks appropriate home exercise program and pain with function  Other impairment: Night waking due to pain and numbness  Symptom irritability: moderate    Assessment details: Spencer is a 41 y/o LHD male presenting with CuTS history of 2 months.  Due to night waking and ongoing numbness and pain, he sought care of an orthopedic.  He was referred for US evaluation and returns to his physician this week to discuss results.  Pain and numbness are presenting with sleeping, leaning on elbow, and with some lifting exercises at the gym.  He is employed remotely as a  and states that his work station is ergonomically acceptable.      Examination reveals normal motion and /pinch strength.  (-) Froments;  (+) Tinels over CuT;  (+) elbow flexion test.  No atrophy, no edema.  Pain mild except with provocative positions.  Evaluation is of low complexity, due to minimal comorbidities and stable clinical presentation.        Pt demonstrates good tolerance of therapy today and was provided with a written HEP focusing on nerve protection, nerve gliding, and protective positions.  He was instructed to perform exercises daily.   The patient demonstrates HEP instructions appropriately, verbalizes understanding, and is in agreement with the written HEP.  Pt will benefit from skilled OT intervention to resolve symptoms and pain, allowing return to PLOF.         Understanding of Dx/Px/POC: excellent     Prognosis: excellent    Goals  STG - 2 weeks  Reduce resting pain to less than 2/10  HEP compliance of stretches and glides  HEP compliance in wearing brace/strap as  instructed.    LTG  Achieve premorbid AROM of Fa/wrist/hand  Pain with gym exercises less  than 2/10  Resolve sensory complaints reduced by 50%  Return to PLOF with symptom control.            Plan  Patient would benefit from: skilled occupational therapy  Planned modality interventions: thermotherapy: hydrocollator packs    Planned therapy interventions: IASTM, joint mobilization, kinesiology taping, manual therapy, massage and activity modification    Duration in weeks: 6  Plan of Care beginning date: 7/1/2025  Plan of Care expiration date: 8/12/2025  Treatment plan discussed with: patient      Subjective Evaluation    History of Present Illness  Mechanism of injury: Non trauma left elbow medial pain          Recurrent probem    Quality of life: excellent    Pain  Quality: discomfort (paresthesias)  Relieving factors: rest (holding arm straight)  Exacerbated by: Leaning on elbow and night positions.  Progression: no change    Social Support  Lives with: spouse    Employment status: working ([employer])  Hand dominance: left        Objective     Observations     Left Wrist/Hand   Negative for atrophy and edema.     Tenderness     Left Elbow   Tenderness in the medial epicondyle.     Left Wrist/Hand   Tenderness in the medial epicondyle.     Neurological Testing     Sensation     Wrist/Hand   Left   Intact: light touch  Diminished: light touch    Additional Neurological Details  Diminished per pt report;  [place] Dejan screening WNL on lowest testing level.     Active Range of Motion     Left Elbow   Normal active range of motion    Left Wrist   Normal active range of motion    Strength/Myotome Testing     Left Wrist/Hand   Normal wrist strength    Additional Strength Details  Bertin #2  L  123 lbs,  R  127 lbs    Lat pinch  L  21 lbs,  R  20 lbs     Tests     Left Elbow   Positive elbow flexion and Tinel's sign (cubital tunnel).     Left Wrist/Hand   Negative Froment's sign.              Precautions:   universal    Access Code: [email or web address]  URL: [email or web address]  Date: 07/01/2025  Prepared by: Nicole Quan    Exercises  - Standing Ulnar Nerve Glide  - 3 x daily - 7 x weekly - 1 sets - 10 reps - 3-4 hold    POC expires Unit limit Auth Expiration date PT/OT/ST + Visit Limit?   8/12 4 12/31 30V         Dx L CuTS     Dr Diana JARQUIN @      [facility]         Date 7/1            Visit 1            Manuals                                                                 Neuro Re-Ed                                                                                                        Ther Ex             HEP Night position;  UNGE; nerve protect;  Elbow pad                                                                                                       Ther Activity                                       Gait Training                                       Modalities

## 2025-07-03 VITALS — BODY MASS INDEX: 26.69 KG/M2 | HEIGHT: 74 IN | WEIGHT: 208 LBS

## 2025-07-03 DIAGNOSIS — G56.22 CUBITAL TUNNEL SYNDROME ON LEFT: Primary | ICD-10-CM

## 2025-07-03 PROCEDURE — 99213 OFFICE O/P EST LOW 20 MIN: CPT | Performed by: FAMILY MEDICINE

## 2025-07-03 NOTE — PROGRESS NOTES
Name: Spencer Hoover      : 1985      MRN: [id number]  Encounter Provider: Shar Daily DO  Encounter Date: 7/3/2025   Encounter department: [facility] [place]  :  Assessment & Plan  Cubital tunnel syndrome on left  40-year-old left-hand-dominant male with left elbow pain and left forearm and hand pain and numbness more than 2 years duration.  Ultrasound noted for:  -Enlarged ulnar nerve near/within cubital tunnel suspicious for ulnar neuropathy/cubital tunnel syndrome. On dynamic imaging, the ulnar nerve dislocates anterior out of the cubital tunnel as elbow is ranged from extension to flexion.  -There is an anconeus epitrochlearis muscle  Clinical impression is that he has symptoms from cubital tunnel syndrome.  Due to ulnar nerve instability I recommend he be seen and evaluated by orthopedic surgeon.  Patient requests Dr. Olmos.  Recommend he continue with formal therapy and doing home exercises.  Follow-up with me as needed.  Orders:    Ambulatory Referral to Orthopedic Surgery; Future        History of Present Illness   Chief Complaint   Patient presents with    Left Elbow - Follow-up, Numbness, Pain      HPI  Spencer Hoover is a 40 y.o. male left-hand-dominant who presents for follow-up of left elbow pain and left forearm and hand pain and numbness more than 2 years duration.  He was last seen by me 2 weeks ago at which point he was referred for ultrasound due to concern for cubital tunnel syndrome.  He was also referred to formal therapy.  He attended formal therapy and has been doing home exercises.  He reports that with changing some of his sleep habits/positioning symptoms are mildly improved, but still bothersome.  He reports having pain described as localized to the medial aspect the elbow, achy and throbbing and burning, sometimes sharp and shooting, radiating distally to ulnar aspect of forearm and 4th and 5th digits, associated with numbness  "and tingling, worse with activity, and improved with changing position.    History obtained from: patient    Review of Systems   Musculoskeletal:  Positive for arthralgias. Negative for joint swelling.   Neurological:  Positive for numbness. Negative for weakness.          Objective   Ht 6' 2\" (1.88 m)   Wt 94.3 kg (208 lb)   BMI 26.71 kg/m²      Physical Exam  Vitals and nursing note reviewed.   Constitutional:       Appearance: Normal appearance. He is well-developed. He is not ill-appearing or diaphoretic.   HENT:      Head: Normocephalic and atraumatic.      Right Ear: External ear normal.      Left Ear: External ear normal.     Eyes:      General:         Right eye: No discharge.         Left eye: No discharge.     Pulmonary:      Effort: Pulmonary effort is normal. No respiratory distress.   Abdominal:      General: There is no distension.     Musculoskeletal:         General: No tenderness.     Skin:     General: Skin is warm and dry.      Coloration: Skin is not jaundiced or pale.     Neurological:      Mental Status: He is alert and oriented to person, place, and time.     Psychiatric:         Mood and Affect: Mood normal.         Behavior: Behavior normal.         Thought Content: Thought content normal.         Judgment: Judgment normal.         I have personally reviewed pertinent films in PACS and my interpretation is  .   Ultrasound noted for enlarged ulnar nerve within the cubital tunnel.    Administrative Statements   I have spent a total time of 20 minutes in caring for this patient on the day of the visit/encounter including Diagnostic results, Prognosis, Risks and benefits of tx options, Instructions for management, Impressions, Documenting in the medical record, Reviewing/placing orders in the medical record (including tests, medications, and/or procedures), and Obtaining or reviewing history  .  "

## 2025-07-03 NOTE — ASSESSMENT & PLAN NOTE
40-year-old left-hand-dominant male with left elbow pain and left forearm and hand pain and numbness more than 2 years duration.  Ultrasound noted for:  -Enlarged ulnar nerve near/within cubital tunnel suspicious for ulnar neuropathy/cubital tunnel syndrome. On dynamic imaging, the ulnar nerve dislocates anterior out of the cubital tunnel as elbow is ranged from extension to flexion.  -There is an anconeus epitrochlearis muscle  Clinical impression is that he has symptoms from cubital tunnel syndrome.  Due to ulnar nerve instability I recommend he be seen and evaluated by orthopedic surgeon.  Patient requests Dr. Olmos.  Recommend he continue with formal therapy and doing home exercises.  Follow-up with me as needed.  Orders:    Ambulatory Referral to Orthopedic Surgery; Future

## 2025-07-16 ENCOUNTER — OFFICE VISIT (OUTPATIENT)
Dept: OCCUPATIONAL THERAPY | Facility: CLINIC | Age: 40
End: 2025-07-16
Attending: FAMILY MEDICINE
Payer: COMMERCIAL

## 2025-07-16 DIAGNOSIS — G56.22 CUBITAL TUNNEL SYNDROME ON LEFT: Primary | ICD-10-CM

## 2025-07-16 PROCEDURE — 97110 THERAPEUTIC EXERCISES: CPT | Performed by: OCCUPATIONAL THERAPIST

## 2025-07-16 NOTE — PROGRESS NOTES
OT Evaluation     Today's date: 2025  Patient name: Spencer Hoover  : 1985  MRN: [id number]  Referring provider: Shar Daily*  Dx:   Encounter Diagnosis     ICD-10-CM    1. Cubital tunnel syndrome on left  G56.22                      Assessment  Impairments: activity intolerance  Other impairment: Night waking due to pain and numbness  Symptom irritability: moderate    Assessment details:   Initial Evaluation  Spencer is a 39 y/o LHD male presenting with CuTS history of 2 months.  Due to night waking and ongoing numbness and pain, he sought care of an orthopedic.  He was referred for US evaluation and returns to his physician this week to discuss results.  Pain and numbness are presenting with sleeping, leaning on elbow, and with some lifting exercises at the gym.  He is employed remotely as a  and states that his work station is ergonomically acceptable.      Examination reveals normal motion and /pinch strength.  (-) Froments;  (+) Tinels over CuT;  (+) elbow flexion test.  No atrophy, no edema.  Pain mild except with provocative positions.  Evaluation is of low complexity, due to minimal comorbidities and stable clinical presentation.        Pt demonstrates good tolerance of therapy today and was provided with a written HEP focusing on nerve protection, nerve gliding, and protective positions.  He was instructed to perform exercises daily.   The patient demonstrates HEP instructions appropriately, verbalizes understanding, and is in agreement with the written HEP.  Pt will benefit from skilled OT intervention to resolve symptoms and pain, allowing return to PLOF.      Discharge Summary 25  Spencer has attended two therapy visits and has recently had an US to the CuT.  It was determined at that visit, the ulnar nerve was subluxing.  He is being sent for EMG for nerve assessment and he will then return to his physician and discuss results and options.  He is independent  in his HEP for nerve protection, nerve gliding, and activity modifications.  . Recommend discharge from OT. The patient is in agreement with discharge plan.       Understanding of Dx/Px/POC: excellent     Prognosis: excellent    Goals  STG - 2 weeks  Reduce resting pain to less than 2/10 MET  HEP compliance of stretches and glides MET  HEP compliance in wearing brace/strap as instructed.MET    LTG  Achieve premorbid AROM of Fa/wrist/hand MET  Pain with gym exercises less  than 2/10 NOT MET  Resolve sensory complaints reduced by 50% MET with modifications  Return to PLOF with symptom control. NOT MET           Plan  Therapy options: Discharge for testing and return to physician.    Duration in weeks: 6  Plan of Care beginning date: 7/1/2025  Plan of Care expiration date: 8/12/2025  Treatment plan discussed with: patient      Subjective Evaluation    History of Present Illness  Mechanism of injury: Non trauma left elbow medial pain;  daily ulnar nerve numbness          Recurrent probem    Quality of life: excellent    Patient Goals  Patient goals for therapy: decreased pain  Patient goal: resolve numbness  Pain  Current pain rating: 3  Quality: discomfort (paresthesias)  Relieving factors: rest (holding arm straight)  Exacerbated by: Leaning on elbow and night positions.  Progression: no change    Social Support  Lives with: spouse    Employment status: working ([employer])  Hand dominance: left    Treatments  Previous treatment: occupational therapy  Current treatment: occupational therapy      Objective     Observations     Left Wrist/Hand   Negative for atrophy and edema.     Tenderness     Left Elbow   Tenderness in the cubital tunnel. No tenderness in the medial epicondyle.     Left Wrist/Hand   No tenderness in the medial epicondyle.     Neurological Testing     Sensation     Wrist/Hand   Left   Intact: light touch  Diminished: light touch    Additional Neurological Details  Diminished per pt report;  [place]  Dejan screening WNL on lowest testing level.     Active Range of Motion     Left Elbow   Normal active range of motion    Left Wrist   Normal active range of motion    Strength/Myotome Testing     Left Wrist/Hand   Normal wrist strength    Additional Strength Details  Bertin #2  L  123 lbs,  R  127 lbs    Lat pinch  L  21 lbs,  R  20 lbs     Tests     Left Elbow   Positive elbow flexion and Tinel's sign (cubital tunnel).     Left Wrist/Hand   Negative Froment's sign.              Precautions:  universal    Access Code: [email or web address]  URL: [email or web address]  Date: 07/01/2025  Prepared by: Nicole Quan    Exercises  - Standing Ulnar Nerve Glide  - 3 x daily - 7 x weekly - 1 sets - 10 reps - 3-4 hold    POC expires Unit limit Auth Expiration date PT/OT/ST + Visit Limit?   8/12 4 12/31 30V         Dx L CuTS     Dr Diana JARQUIN @ [facility] 40         Date 7/1 7/16           Visit 1 2           Manuals                                                                 Neuro Re-Ed                                                                                                        Ther Ex 15   23           HEP Night position;  UNGE; nerve protect;  Elbow pad Review all HEP for nerve protect, nerve glide, night positions;  Pt ed for possible surgery with details                                                                                                      Ther Activity                                       Gait Training                                       Modalities

## 2025-07-28 DIAGNOSIS — G89.4 CHRONIC PROSTATITIS/CHRONIC PELVIC PAIN SYNDROME: ICD-10-CM

## 2025-07-28 DIAGNOSIS — N41.1 CHRONIC PROSTATITIS/CHRONIC PELVIC PAIN SYNDROME: ICD-10-CM

## 2025-07-28 RX ORDER — TADALAFIL 5 MG/1
5 TABLET ORAL DAILY
Qty: 30 TABLET | Refills: 0 | Status: SHIPPED | OUTPATIENT
Start: 2025-07-28 | End: 2025-08-27